# Patient Record
Sex: MALE | Race: WHITE | NOT HISPANIC OR LATINO | Employment: OTHER | URBAN - METROPOLITAN AREA
[De-identification: names, ages, dates, MRNs, and addresses within clinical notes are randomized per-mention and may not be internally consistent; named-entity substitution may affect disease eponyms.]

---

## 2019-08-09 ENCOUNTER — APPOINTMENT (OUTPATIENT)
Dept: RADIOLOGY | Facility: MEDICAL CENTER | Age: 74
End: 2019-08-09
Payer: MEDICARE

## 2019-08-09 ENCOUNTER — OFFICE VISIT (OUTPATIENT)
Dept: URGENT CARE | Facility: MEDICAL CENTER | Age: 74
End: 2019-08-09
Payer: MEDICARE

## 2019-08-09 VITALS
BODY MASS INDEX: 26.95 KG/M2 | WEIGHT: 203.38 LBS | RESPIRATION RATE: 18 BRPM | OXYGEN SATURATION: 99 % | HEIGHT: 73 IN | SYSTOLIC BLOOD PRESSURE: 120 MMHG | HEART RATE: 63 BPM | TEMPERATURE: 97.8 F | DIASTOLIC BLOOD PRESSURE: 70 MMHG

## 2019-08-09 DIAGNOSIS — S20.211A CONTUSION OF RIB ON RIGHT SIDE, INITIAL ENCOUNTER: ICD-10-CM

## 2019-08-09 DIAGNOSIS — S20.211A CONTUSION OF RIB ON RIGHT SIDE, INITIAL ENCOUNTER: Primary | ICD-10-CM

## 2019-08-09 PROCEDURE — 99213 OFFICE O/P EST LOW 20 MIN: CPT | Performed by: PHYSICIAN ASSISTANT

## 2019-08-09 PROCEDURE — 73080 X-RAY EXAM OF ELBOW: CPT

## 2019-08-09 PROCEDURE — G0463 HOSPITAL OUTPT CLINIC VISIT: HCPCS | Performed by: PHYSICIAN ASSISTANT

## 2019-08-09 PROCEDURE — 71101 X-RAY EXAM UNILAT RIBS/CHEST: CPT

## 2019-08-09 RX ORDER — RANITIDINE 300 MG/1
300 TABLET ORAL
COMMUNITY
End: 2020-11-12 | Stop reason: ALTCHOICE

## 2019-08-09 RX ORDER — SIMVASTATIN 10 MG
40 TABLET ORAL
COMMUNITY
Start: 2011-07-20 | End: 2022-03-31 | Stop reason: SDUPTHER

## 2019-08-09 NOTE — PATIENT INSTRUCTIONS
Rib contusion  Elbow contusion  Over the counter tylenol for pain    Follow up with PCP in 3-5 days  Proceed to  ER if symptoms worsen  Rib Contusion   WHAT YOU NEED TO KNOW:   A rib contusion is a bruise on one or more of your ribs  DISCHARGE INSTRUCTIONS:   Return to the emergency department if:   · You have increased chest pain  · You have shortness of breath  · You start to cough up blood  · Your pain does not improve with pain medicine  Contact your healthcare provider if:   · You have a cough  · You have a fever  · You have questions or concerns about your condition or care  Medicines: You may need any of the following:  · NSAIDs , such as ibuprofen, help decrease swelling, pain, and fever  This medicine is available with or without a doctor's order  NSAIDs can cause stomach bleeding or kidney problems in certain people  If you take blood thinner medicine, always ask if NSAIDs are safe for you  Always read the medicine label and follow directions  Do not give these medicines to children under 10months of age without direction from your child's healthcare provider  · Prescription pain medicine  may be given  Ask how to take this medicine safely  · Take your medicine as directed  Contact your healthcare provider if you think your medicine is not helping or if you have side effects  Tell him of her if you are allergic to any medicine  Keep a list of the medicines, vitamins, and herbs you take  Include the amounts, and when and why you take them  Bring the list or the pill bottles to follow-up visits  Carry your medicine list with you in case of an emergency  Deep breathing:   · To help prevent pneumonia, take 10 deep breaths every hour, even when you wake up during the night  Brace your ribs with your hands or a pillow while you take deep breaths or cough  This will help decrease your pain  · You may need to use an incentive spirometer to help you take deeper breaths   Put the plastic piece into your mouth and take a very deep breath  Hold your breath as long as you can  Then let out your breath  Do this 10 times in a row every hour while you are awake  Rest:  Rest your ribs to decrease swelling and allow the injury to heal faster  Avoid activities that may cause more pain or damage to your ribs  As your pain decreases, begin movements slowly  Ice:  Ice helps decrease swelling and pain  Ice may also help prevent tissue damage  Use an ice pack or put crushed ice in a plastic bag  Cover it with a towel and place it on your bruised area for 15 to 20 minutes every hour as directed  Follow up with your healthcare provider as directed:  Write down your questions so you remember to ask them during your visits  © 2017 [facility] Information is for End User's use only and may not be sold, redistributed or otherwise used for commercial purposes  All illustrations and images included in CareNotes® are the copyrighted property of A D A M [employer] Inc  or [employer] Carlos Schuster  The above information is an  only  It is not intended as medical advice for individual conditions or treatments  Talk to your doctor, nurse or pharmacist before following any medical regimen to see if it is safe and effective for you

## 2019-08-09 NOTE — PROGRESS NOTES
[facility]        NAME: Garrick Mccullough is a 69 y o  male  : 1945    MRN: [id number]  DATE: 2019  TIME: 10:06 AM    Assessment and Plan   Contusion of rib on right side, initial encounter [S20 211A]  1  Contusion of rib on right side, initial encounter  XR ribs right w pa chest min 3 views         Patient Instructions     Rib contusion  Elbow contusion  Over the counter tylenol for pain    Follow up with PCP in 3-5 days  Proceed to  ER if symptoms worsen  Chief Complaint     Chief Complaint   Patient presents with   Mollie Sizer Fall     trip while walking dog over curb  injury right elbow, abrasion noted on right knee and forearm  pt has pacemaker on right side of chest and chest feels bruised  chest feels better today than yestrday  no bruising noted to skin   History of Present Illness       68 y/o male states he was walking his dog he tripped on uneven ground and fell on to right side, complains of pain to right side of ribs and right elbow  Patient denies chest pain, SOB, nausea, vomiting, palpitations, diaphoresis  States his tetanus is up to date      Review of Systems   Review of Systems   Constitutional: Negative  HENT: Negative  Eyes: Negative  Respiratory: Negative  Negative for apnea, cough, choking, chest tightness, shortness of breath, wheezing and stridor  Cardiovascular: Negative  Negative for chest pain           Current Medications       Current Outpatient Medications:     Glutamine 500 MG CAPS, Take by mouth, Disp: , Rfl:     ranitidine (ZANTAC) 300 MG tablet, Take 300 mg by mouth daily at bedtime, Disp: , Rfl:     rivaroxaban (XARELTO) 20 mg tablet, Take 20 mg by mouth, Disp: , Rfl:     simvastatin (ZOCOR) 10 mg tablet, Take by mouth, Disp: , Rfl:     Current Allergies     Allergies as of 2019    (No Known Allergies)            The following portions of the patient's history were reviewed and updated as appropriate: allergies, current medications, past family history, past medical history, past social history, past surgical history and problem list      Past Medical History:   Diagnosis Date    GERD (gastroesophageal reflux disease)     High cholesterol        Past Surgical History:   Procedure Laterality Date    CARDIAC PACEMAKER PLACEMENT      CARDIAC PACEMAKER PLACEMENT         No family history on file  Medications have been verified  Objective   /70   Pulse 63   Temp 97 8 °F (36 6 °C) (Temporal)   Resp 18   Ht 6' 1" (1 854 m)   Wt 92 3 kg (203 lb 6 oz)   SpO2 99%   BMI 26 83 kg/m²        Physical Exam     Physical Exam   Constitutional: He appears well-developed and well-nourished  No distress  Neck: Normal range of motion  Neck supple  Cardiovascular: Normal rate, regular rhythm, normal heart sounds and intact distal pulses  Pulmonary/Chest: Effort normal and breath sounds normal  No respiratory distress  He has no wheezes  He has no rales  He exhibits no tenderness  Musculoskeletal:        Right elbow: He exhibits swelling  He exhibits normal range of motion, no effusion, no deformity and no laceration  Tenderness found  Olecranon process tenderness noted  No radial head, no medial epicondyle and no lateral epicondyle tenderness noted  Arms:       Legs:  Lymphadenopathy:     He has no cervical adenopathy  Skin: He is not diaphoretic

## 2020-11-12 ENCOUNTER — PREP FOR PROCEDURE (OUTPATIENT)
Dept: SURGERY | Facility: CLINIC | Age: 75
End: 2020-11-12

## 2020-11-12 DIAGNOSIS — K46.9 HERNIA OF ABDOMINAL CAVITY: Primary | ICD-10-CM

## 2020-11-12 PROBLEM — I49.5 SICK SINUS SYNDROME (HCC): Chronic | Status: ACTIVE | Noted: 2020-11-12

## 2020-12-05 ENCOUNTER — LAB (OUTPATIENT)
Dept: LAB | Facility: CLINIC | Age: 75
End: 2020-12-05
Payer: MEDICARE

## 2020-12-05 ENCOUNTER — OFFICE VISIT (OUTPATIENT)
Dept: LAB | Facility: CLINIC | Age: 75
End: 2020-12-05
Payer: MEDICARE

## 2020-12-05 DIAGNOSIS — K40.90 INGUINAL HERNIA OF LEFT SIDE WITHOUT OBSTRUCTION OR GANGRENE: ICD-10-CM

## 2020-12-05 DIAGNOSIS — K46.9 HERNIA OF ABDOMINAL CAVITY: ICD-10-CM

## 2020-12-05 LAB
ANION GAP SERPL CALCULATED.3IONS-SCNC: 8 MMOL/L (ref 4–13)
BASOPHILS # BLD AUTO: 0.02 THOUSANDS/ΜL (ref 0–0.1)
BASOPHILS NFR BLD AUTO: 0 % (ref 0–1)
BUN SERPL-MCNC: 15 MG/DL (ref 5–25)
CALCIUM SERPL-MCNC: 8.9 MG/DL (ref 8.3–10.1)
CHLORIDE SERPL-SCNC: 103 MMOL/L (ref 100–108)
CO2 SERPL-SCNC: 27 MMOL/L (ref 21–32)
CREAT SERPL-MCNC: 0.97 MG/DL (ref 0.6–1.3)
EOSINOPHIL # BLD AUTO: 0.15 THOUSAND/ΜL (ref 0–0.61)
EOSINOPHIL NFR BLD AUTO: 2 % (ref 0–6)
ERYTHROCYTE [DISTWIDTH] IN BLOOD BY AUTOMATED COUNT: 13.2 % (ref 11.6–15.1)
GFR SERPL CREATININE-BSD FRML MDRD: 77 ML/MIN/1.73SQ M
GLUCOSE P FAST SERPL-MCNC: 97 MG/DL (ref 65–99)
HCT VFR BLD AUTO: 38.7 % (ref 36.5–49.3)
HGB BLD-MCNC: 12.5 G/DL (ref 12–17)
IMM GRANULOCYTES # BLD AUTO: 0.04 THOUSAND/UL (ref 0–0.2)
IMM GRANULOCYTES NFR BLD AUTO: 1 % (ref 0–2)
LYMPHOCYTES # BLD AUTO: 2.91 THOUSANDS/ΜL (ref 0.6–4.47)
LYMPHOCYTES NFR BLD AUTO: 45 % (ref 14–44)
MCH RBC QN AUTO: 30.6 PG (ref 26.8–34.3)
MCHC RBC AUTO-ENTMCNC: 32.3 G/DL (ref 31.4–37.4)
MCV RBC AUTO: 95 FL (ref 82–98)
MONOCYTES # BLD AUTO: 0.44 THOUSAND/ΜL (ref 0.17–1.22)
MONOCYTES NFR BLD AUTO: 7 % (ref 4–12)
NEUTROPHILS # BLD AUTO: 2.97 THOUSANDS/ΜL (ref 1.85–7.62)
NEUTS SEG NFR BLD AUTO: 45 % (ref 43–75)
NRBC BLD AUTO-RTO: 0 /100 WBCS
PLATELET # BLD AUTO: 207 THOUSANDS/UL (ref 149–390)
PMV BLD AUTO: 10.3 FL (ref 8.9–12.7)
POTASSIUM SERPL-SCNC: 3.8 MMOL/L (ref 3.5–5.3)
RBC # BLD AUTO: 4.09 MILLION/UL (ref 3.88–5.62)
SODIUM SERPL-SCNC: 138 MMOL/L (ref 136–145)
WBC # BLD AUTO: 6.53 THOUSAND/UL (ref 4.31–10.16)

## 2020-12-05 PROCEDURE — 80048 BASIC METABOLIC PNL TOTAL CA: CPT

## 2020-12-05 PROCEDURE — U0003 INFECTIOUS AGENT DETECTION BY NUCLEIC ACID (DNA OR RNA); SEVERE ACUTE RESPIRATORY SYNDROME CORONAVIRUS 2 (SARS-COV-2) (CORONAVIRUS DISEASE [COVID-19]), AMPLIFIED PROBE TECHNIQUE, MAKING USE OF HIGH THROUGHPUT TECHNOLOGIES AS DESCRIBED BY CMS-2020-01-R: HCPCS | Performed by: SURGERY

## 2020-12-05 PROCEDURE — 93005 ELECTROCARDIOGRAM TRACING: CPT

## 2020-12-05 PROCEDURE — 36415 COLL VENOUS BLD VENIPUNCTURE: CPT

## 2020-12-05 PROCEDURE — 85025 COMPLETE CBC W/AUTO DIFF WBC: CPT

## 2020-12-06 LAB — SARS-COV-2 RNA SPEC QL NAA+PROBE: NOT DETECTED

## 2020-12-08 LAB
ATRIAL RATE: 64 BPM
P AXIS: 95 DEGREES
PR INTERVAL: 272 MS
QRS AXIS: -33 DEGREES
QRSD INTERVAL: 110 MS
QT INTERVAL: 454 MS
QTC INTERVAL: 468 MS
T WAVE AXIS: -47 DEGREES
VENTRICULAR RATE: 64 BPM

## 2020-12-08 PROCEDURE — 93010 ELECTROCARDIOGRAM REPORT: CPT | Performed by: INTERNAL MEDICINE

## 2020-12-10 ENCOUNTER — ANESTHESIA EVENT (OUTPATIENT)
Dept: PERIOP | Facility: HOSPITAL | Age: 75
End: 2020-12-10
Payer: MEDICARE

## 2020-12-11 ENCOUNTER — ANESTHESIA (OUTPATIENT)
Dept: PERIOP | Facility: HOSPITAL | Age: 75
End: 2020-12-11
Payer: MEDICARE

## 2020-12-11 ENCOUNTER — HOSPITAL ENCOUNTER (OUTPATIENT)
Facility: HOSPITAL | Age: 75
Setting detail: OUTPATIENT SURGERY
Discharge: HOME | End: 2020-12-11
Attending: SURGERY | Admitting: SURGERY
Payer: MEDICARE

## 2020-12-11 VITALS
HEIGHT: 73 IN | SYSTOLIC BLOOD PRESSURE: 153 MMHG | DIASTOLIC BLOOD PRESSURE: 76 MMHG | BODY MASS INDEX: 30.62 KG/M2 | TEMPERATURE: 97.2 F | WEIGHT: 231 LBS | HEART RATE: 57 BPM | RESPIRATION RATE: 18 BRPM | OXYGEN SATURATION: 96 %

## 2020-12-11 VITALS — HEART RATE: 60 BPM

## 2020-12-11 DIAGNOSIS — K40.90 LEFT INGUINAL HERNIA: Primary | ICD-10-CM

## 2020-12-11 PROCEDURE — 99024 POSTOP FOLLOW-UP VISIT: CPT | Performed by: SURGERY

## 2020-12-11 PROCEDURE — 49650 LAP ING HERNIA REPAIR INIT: CPT | Performed by: SURGERY

## 2020-12-11 PROCEDURE — C1781 MESH (IMPLANTABLE): HCPCS | Performed by: SURGERY

## 2020-12-11 DEVICE — POLYPROPYLENE NON-ABSORBABLE SYNTHETIC SURGICAL MESH
Type: IMPLANTABLE DEVICE | Site: INGUINAL | Status: FUNCTIONAL
Brand: PROLENE

## 2020-12-11 RX ORDER — ONDANSETRON 2 MG/ML
4 INJECTION INTRAMUSCULAR; INTRAVENOUS EVERY 6 HOURS PRN
Status: CANCELLED | OUTPATIENT
Start: 2020-12-11

## 2020-12-11 RX ORDER — FENTANYL CITRATE/PF 50 MCG/ML
25 SYRINGE (ML) INJECTION
Status: DISCONTINUED | OUTPATIENT
Start: 2020-12-11 | End: 2020-12-11 | Stop reason: HOSPADM

## 2020-12-11 RX ORDER — OXYCODONE HYDROCHLORIDE AND ACETAMINOPHEN 5; 325 MG/1; MG/1
1 TABLET ORAL EVERY 4 HOURS PRN
Qty: 25 TABLET | Refills: 0 | Status: SHIPPED | OUTPATIENT
Start: 2020-12-11 | End: 2020-12-18

## 2020-12-11 RX ORDER — LIDOCAINE HYDROCHLORIDE 20 MG/ML
INJECTION, SOLUTION EPIDURAL; INFILTRATION; INTRACAUDAL; PERINEURAL AS NEEDED
Status: DISCONTINUED | OUTPATIENT
Start: 2020-12-11 | End: 2020-12-11

## 2020-12-11 RX ORDER — CEFAZOLIN SODIUM 2 G/50ML
2000 SOLUTION INTRAVENOUS ONCE
Status: COMPLETED | OUTPATIENT
Start: 2020-12-11 | End: 2020-12-11

## 2020-12-11 RX ORDER — HEPARIN SODIUM 5000 [USP'U]/ML
5000 INJECTION, SOLUTION INTRAVENOUS; SUBCUTANEOUS ONCE
Status: COMPLETED | OUTPATIENT
Start: 2020-12-11 | End: 2020-12-11

## 2020-12-11 RX ORDER — FENTANYL CITRATE 50 UG/ML
INJECTION, SOLUTION INTRAMUSCULAR; INTRAVENOUS AS NEEDED
Status: DISCONTINUED | OUTPATIENT
Start: 2020-12-11 | End: 2020-12-11

## 2020-12-11 RX ORDER — OXYCODONE HYDROCHLORIDE AND ACETAMINOPHEN 5; 325 MG/1; MG/1
1 TABLET ORAL EVERY 4 HOURS PRN
Status: CANCELLED | OUTPATIENT
Start: 2020-12-11

## 2020-12-11 RX ORDER — BUPIVACAINE HYDROCHLORIDE AND EPINEPHRINE 2.5; 5 MG/ML; UG/ML
INJECTION, SOLUTION EPIDURAL; INFILTRATION; INTRACAUDAL; PERINEURAL AS NEEDED
Status: DISCONTINUED | OUTPATIENT
Start: 2020-12-11 | End: 2020-12-11 | Stop reason: HOSPADM

## 2020-12-11 RX ORDER — DEXAMETHASONE SODIUM PHOSPHATE 4 MG/ML
INJECTION, SOLUTION INTRA-ARTICULAR; INTRALESIONAL; INTRAMUSCULAR; INTRAVENOUS; SOFT TISSUE AS NEEDED
Status: DISCONTINUED | OUTPATIENT
Start: 2020-12-11 | End: 2020-12-11

## 2020-12-11 RX ORDER — SODIUM CHLORIDE, SODIUM LACTATE, POTASSIUM CHLORIDE, CALCIUM CHLORIDE 600; 310; 30; 20 MG/100ML; MG/100ML; MG/100ML; MG/100ML
75 INJECTION, SOLUTION INTRAVENOUS CONTINUOUS
Status: CANCELLED | OUTPATIENT
Start: 2020-12-11

## 2020-12-11 RX ORDER — SODIUM CHLORIDE, SODIUM LACTATE, POTASSIUM CHLORIDE, CALCIUM CHLORIDE 600; 310; 30; 20 MG/100ML; MG/100ML; MG/100ML; MG/100ML
20 INJECTION, SOLUTION INTRAVENOUS CONTINUOUS
Status: CANCELLED | OUTPATIENT
Start: 2020-12-11

## 2020-12-11 RX ORDER — PROPOFOL 10 MG/ML
INJECTION, EMULSION INTRAVENOUS AS NEEDED
Status: DISCONTINUED | OUTPATIENT
Start: 2020-12-11 | End: 2020-12-11

## 2020-12-11 RX ORDER — DIPHENHYDRAMINE HYDROCHLORIDE 50 MG/ML
12.5 INJECTION INTRAMUSCULAR; INTRAVENOUS ONCE AS NEEDED
Status: DISCONTINUED | OUTPATIENT
Start: 2020-12-11 | End: 2020-12-11 | Stop reason: HOSPADM

## 2020-12-11 RX ORDER — ONDANSETRON 2 MG/ML
INJECTION INTRAMUSCULAR; INTRAVENOUS AS NEEDED
Status: DISCONTINUED | OUTPATIENT
Start: 2020-12-11 | End: 2020-12-11

## 2020-12-11 RX ORDER — SODIUM CHLORIDE, SODIUM LACTATE, POTASSIUM CHLORIDE, CALCIUM CHLORIDE 600; 310; 30; 20 MG/100ML; MG/100ML; MG/100ML; MG/100ML
125 INJECTION, SOLUTION INTRAVENOUS CONTINUOUS
Status: DISCONTINUED | OUTPATIENT
Start: 2020-12-11 | End: 2020-12-11 | Stop reason: HOSPADM

## 2020-12-11 RX ADMIN — CEFAZOLIN SODIUM 2000 MG: 2 SOLUTION INTRAVENOUS at 08:20

## 2020-12-11 RX ADMIN — PHENYLEPHRINE HYDROCHLORIDE 100 MCG: 10 INJECTION INTRAVENOUS at 09:30

## 2020-12-11 RX ADMIN — FENTANYL CITRATE 50 MCG: 50 INJECTION, SOLUTION INTRAMUSCULAR; INTRAVENOUS at 08:30

## 2020-12-11 RX ADMIN — HEPARIN SODIUM 5000 UNITS: 5000 INJECTION, SOLUTION INTRAVENOUS; SUBCUTANEOUS at 08:28

## 2020-12-11 RX ADMIN — SODIUM CHLORIDE, SODIUM LACTATE, POTASSIUM CHLORIDE, AND CALCIUM CHLORIDE 125 ML/HR: .6; .31; .03; .02 INJECTION, SOLUTION INTRAVENOUS at 07:22

## 2020-12-11 RX ADMIN — LIDOCAINE HYDROCHLORIDE 50 MG: 20 INJECTION, SOLUTION EPIDURAL; INFILTRATION; INTRACAUDAL; PERINEURAL at 08:25

## 2020-12-11 RX ADMIN — ONDANSETRON 4 MG: 2 INJECTION INTRAMUSCULAR; INTRAVENOUS at 08:34

## 2020-12-11 RX ADMIN — FENTANYL CITRATE 50 MCG: 50 INJECTION, SOLUTION INTRAMUSCULAR; INTRAVENOUS at 08:42

## 2020-12-11 RX ADMIN — PHENYLEPHRINE HYDROCHLORIDE 100 MCG: 10 INJECTION INTRAVENOUS at 08:51

## 2020-12-11 RX ADMIN — PHENYLEPHRINE HYDROCHLORIDE 50 MCG: 10 INJECTION INTRAVENOUS at 09:16

## 2020-12-11 RX ADMIN — FENTANYL CITRATE 25 MCG: 50 INJECTION INTRAMUSCULAR; INTRAVENOUS at 10:01

## 2020-12-11 RX ADMIN — PROPOFOL 200 MG: 10 INJECTION, EMULSION INTRAVENOUS at 08:25

## 2020-12-11 RX ADMIN — PHENYLEPHRINE HYDROCHLORIDE 100 MCG: 10 INJECTION INTRAVENOUS at 08:59

## 2020-12-11 RX ADMIN — DEXAMETHASONE SODIUM PHOSPHATE 4 MG: 4 INJECTION, SOLUTION INTRA-ARTICULAR; INTRALESIONAL; INTRAMUSCULAR; INTRAVENOUS; SOFT TISSUE at 08:34

## 2020-12-29 ENCOUNTER — OFFICE VISIT (OUTPATIENT)
Dept: SURGERY | Facility: CLINIC | Age: 75
End: 2020-12-29

## 2020-12-29 VITALS
HEART RATE: 76 BPM | SYSTOLIC BLOOD PRESSURE: 126 MMHG | HEIGHT: 73 IN | BODY MASS INDEX: 30.88 KG/M2 | DIASTOLIC BLOOD PRESSURE: 84 MMHG | WEIGHT: 233 LBS

## 2020-12-29 DIAGNOSIS — K40.90 INGUINAL HERNIA OF LEFT SIDE WITHOUT OBSTRUCTION OR GANGRENE: Primary | ICD-10-CM

## 2020-12-29 PROCEDURE — 99024 POSTOP FOLLOW-UP VISIT: CPT | Performed by: SURGERY

## 2021-05-05 ENCOUNTER — OFFICE VISIT (OUTPATIENT)
Dept: CARDIOLOGY CLINIC | Facility: MEDICAL CENTER | Age: 76
End: 2021-05-05
Payer: MEDICARE

## 2021-05-05 VITALS
SYSTOLIC BLOOD PRESSURE: 118 MMHG | DIASTOLIC BLOOD PRESSURE: 76 MMHG | OXYGEN SATURATION: 97 % | BODY MASS INDEX: 30.95 KG/M2 | HEIGHT: 73 IN | WEIGHT: 233.5 LBS | HEART RATE: 70 BPM

## 2021-05-05 DIAGNOSIS — I10 ESSENTIAL HYPERTENSION: ICD-10-CM

## 2021-05-05 DIAGNOSIS — R06.02 SHORTNESS OF BREATH: ICD-10-CM

## 2021-05-05 DIAGNOSIS — Z95.0 CARDIAC PACEMAKER IN SITU: ICD-10-CM

## 2021-05-05 DIAGNOSIS — R94.31 ABNORMAL ECG: ICD-10-CM

## 2021-05-05 DIAGNOSIS — Z76.89 ESTABLISHING CARE WITH NEW DOCTOR, ENCOUNTER FOR: Primary | ICD-10-CM

## 2021-05-05 PROCEDURE — 99204 OFFICE O/P NEW MOD 45 MIN: CPT | Performed by: INTERNAL MEDICINE

## 2021-05-05 PROCEDURE — 93000 ELECTROCARDIOGRAM COMPLETE: CPT | Performed by: INTERNAL MEDICINE

## 2021-05-05 NOTE — PROGRESS NOTES
[facility] [address] [address]  Phone#  [phone]  Fax#  [phone]                                              Cardiology Office Consult   Eren Nunn, 78 y o  male  YOB: 1945  MRN: [id number] Encounter: [id number]      PCP - No primary care provider on file  Assessment  1  Shortness of breath  2  Abnormal ECG  3  s/p cardiac pacemaker - dual-chamber, right-sided  · Had 3 5 second pause on holter monitor --> pacemaker (2018)  4  Hypertension  5  Hyperlipidemia  6  GERD  7  S/p left inguinal hernia repair (12/2020)    Plan  Shortness of breath, abnormal ECG  · Ongoing for several months, gradually progressive  · He has gained about 20-30 lbs in the last 1-2 yrs, states he used to be 180 lbs previously  · ?CAD v deconditioning  ·  ECG today is highly concerning for ischemia with deep T-wave inversions in inferior and anterolateral leads  · No recent stress testing  · He is unable to exercise adequately for stress test due to symptoms, and needs imaging due to abnormal ECG  · Check nuclear Lexiscan stress test & echocardiogram    ? Paroxysmal Atrial Fibrillation  · He had a history of irregular heart beats, and had a holter & extended holter in the past in [place]  · Was started on eliquis by his cardiologist there, and remains on it --> Have sent request for records from there  · Currently in sinus rhythm  · Continue eliquis for now    S/p dual-chamber pacemaker, sick sinus syndrome  · Was apparently placed for 3 2nd pause noted on 1 of the Holter monitors, although he was asymptomatic  ·  has a dual-chamber device in place, and prior ECG from December 2020 was AV sequentially paced   · He still gets his device monitored with his cardiologist in [place], home he still continues to follow intermittently, as he goes back and forth between here and [place]    · He plans to continue following device checks with his cardiologist in [place] for now - has a merlin remote device at home    ECG today -  No results found for this visit on 05/05/21  Orders Placed This Encounter   Procedures    POCT ECG       No follow-ups on file  History of Present Illness     82-year-old gentleman comes in as a new patient for consultation regarding ongoing symptoms of shortness of breath with exertion  He trades classic cars, 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  He is constantly back and forth between here and [place], and all of his cardiac care so far has been with a cardiologist (Benigno Ng) in [place], [place]  He reports complains of shortness of breath with exertion, which has been gradually worsening over the past year  He believes it is related to his weight gain and inactivity recently, but  It got worse to a point that he was short of breath with even walking short distances, and as a result he could not go to a trade show recently  No complains of chest pain, nausea or diaphoresis  Due to worsening symptoms, he wanted to get evaluated further and as a result is here for evaluation  No known prior history of CAD  No family history of early CAD  He does not smoke  He reports seeing the cardiologist in [place] due to irregular heartbeats, and cardiac monitors in the past   On one of the cardiac monitor, he was noted to have a 3 5 second pause, as a result of which he was I must to get urgently admitted for a pacemaker in 2018      Historical Information   Past Medical History:   Diagnosis Date    Bradycardia     permanent pacemaker    GERD (gastroesophageal reflux disease)     Hearing aid worn     High cholesterol     Hypertension      Past Surgical History:   Procedure Laterality Date    CARDIAC PACEMAKER PLACEMENT  2017    sees cardiologist in [place]- last visit was 5/2020 and has a monitor to check pacemaker   [facility]  2018 [address] REPAIR      [place] REPAIR RECURR Gypsy Palomino Left 12/11/2020    Procedure: REPAIR HERNIA INGUINAL;  Surgeon: Ewa Torres MD;  Location: [facility];  Service: General     Family History   Problem Relation Age of Onset    Cancer Mother      Current Outpatient Medications on File Prior to Visit   Medication Sig Dispense Refill    apixaban (Eliquis) 5 mg Take 5 mg by mouth 2 (two) times a day Last dose 12/8/20      Glutamine 500 MG CAPS Take by mouth every morning       omeprazole (PriLOSEC) 20 mg delayed release capsule Take 20 mg by mouth daily      simvastatin (ZOCOR) 10 mg tablet Take 40 mg by mouth daily at bedtime        No current facility-administered medications on file prior to visit        No Known Allergies  Social History     Socioeconomic History    Marital status:      Spouse name: None    Number of children: None    Years of education: None    Highest education level: None   Occupational History    None   Social Needs    Financial resource strain: None    Food insecurity     Worry: None     Inability: None    Transportation needs     Medical: None     Non-medical: None   Tobacco Use    Smoking status: Never Smoker    Smokeless tobacco: Never Used   Substance and Sexual Activity    Alcohol use: Yes     Comment: occ    Drug use: Never    Sexual activity: Not Currently   Lifestyle    Physical activity     Days per week: None     Minutes per session: None    Stress: None   Relationships    Social connections     Talks on phone: None     Gets together: None     Attends Zoroastrian service: None     Active member of club or organization: None     Attends meetings of clubs or organizations: None     Relationship status: None    Intimate partner violence     Fear of current or ex partner: None     Emotionally abused: None     Physically abused: None     Forced sexual activity: None   Other Topics Concern    None   Social History Narrative    None        Review of Systems All other systems reviewed and are negative  Vitals:  Vitals:    05/05/21 1316   Weight: 106 kg (233 lb 8 oz)   Height: 6' 1" (1 854 m)     BMI - Body mass index is 30 81 kg/m²  Wt Readings from Last 7 Encounters:   05/05/21 106 kg (233 lb 8 oz)   12/29/20 106 kg (233 lb)   12/11/20 105 kg (231 lb)   11/12/20 99 8 kg (220 lb)   08/09/19 92 3 kg (203 lb 6 oz)   12/09/11 98 kg (216 lb)   08/16/11 95 7 kg (211 lb)       Physical Exam  Vitals signs and nursing note reviewed  Constitutional:       General: He is not in acute distress  Appearance: Normal appearance  He is well-developed  He is not ill-appearing or diaphoretic  HENT:      Head: Normocephalic and atraumatic  Nose: No congestion  Eyes:      General: No scleral icterus  Conjunctiva/sclera: Conjunctivae normal    Neck:      Musculoskeletal: Neck supple  No muscular tenderness  Vascular: No carotid bruit or JVD  Cardiovascular:      Rate and Rhythm: Normal rate and regular rhythm  Heart sounds: Normal heart sounds  No murmur  No friction rub  No gallop  Comments: Right upper chest wall cardiac device noted in-situ  No swelling or tenderness surrounding it  Pulmonary:      Effort: Pulmonary effort is normal  No respiratory distress  Breath sounds: Normal breath sounds  No wheezing or rales  Chest:      Chest wall: No tenderness  Abdominal:      General: There is no distension  Palpations: Abdomen is soft  Tenderness: There is no abdominal tenderness  Musculoskeletal:         General: No swelling, tenderness or deformity  Right lower leg: No edema  Left lower leg: No edema  Skin:     General: Skin is warm  Neurological:      General: No focal deficit present  Mental Status: He is alert and oriented to person, place, and time  Mental status is at baseline  Psychiatric:         Mood and Affect: Mood normal          Behavior: Behavior normal          Thought Content:  Thought content normal        Labs:  CBC:   Lab Results   Component Value Date    WBC 6 53 12/05/2020    RBC 4 09 12/05/2020    HGB 12 5 12/05/2020    HCT 38 7 12/05/2020    MCV 95 12/05/2020     12/05/2020    RDW 13 2 12/05/2020       CMP:   Lab Results   Component Value Date    K 3 8 12/05/2020     12/05/2020    CO2 27 12/05/2020    BUN 15 12/05/2020    CREATININE 0 97 12/05/2020    EGFR 77 12/05/2020    CALCIUM 8 9 12/05/2020       Magnesium:  No results found for: MG    Lipid Profile:   No results found for: CHOL, HDL, TRIG, LDLCALC    Thyroid Studies: No results found for: [id number], T3FREE, FREET4, L0YPZUV, W4YQVPQ    No components found for: HGA1C    No results found for: IIA4    Imaging: No results found  Cardiac testing:   No results found for this or any previous visit  No results found for this or any previous visit  No results found for this or any previous visit  No results found for this or any previous visit

## 2021-06-10 ENCOUNTER — HOSPITAL ENCOUNTER (OUTPATIENT)
Dept: NON INVASIVE DIAGNOSTICS | Facility: CLINIC | Age: 76
Discharge: HOME | End: 2021-06-10
Payer: MEDICARE

## 2021-06-10 DIAGNOSIS — R06.02 SHORTNESS OF BREATH: ICD-10-CM

## 2021-06-10 DIAGNOSIS — I10 ESSENTIAL HYPERTENSION: ICD-10-CM

## 2021-06-10 DIAGNOSIS — R94.31 ABNORMAL ECG: ICD-10-CM

## 2021-06-10 DIAGNOSIS — Z95.0 CARDIAC PACEMAKER IN SITU: ICD-10-CM

## 2021-06-10 PROCEDURE — A9502 TC99M TETROFOSMIN: HCPCS

## 2021-06-10 PROCEDURE — G1004 CDSM NDSC: HCPCS

## 2021-06-10 PROCEDURE — 93306 TTE W/DOPPLER COMPLETE: CPT | Performed by: INTERNAL MEDICINE

## 2021-06-10 PROCEDURE — 93306 TTE W/DOPPLER COMPLETE: CPT

## 2021-06-10 PROCEDURE — 93018 CV STRESS TEST I&R ONLY: CPT | Performed by: INTERNAL MEDICINE

## 2021-06-10 PROCEDURE — 93016 CV STRESS TEST SUPVJ ONLY: CPT | Performed by: INTERNAL MEDICINE

## 2021-06-10 PROCEDURE — 78452 HT MUSCLE IMAGE SPECT MULT: CPT | Performed by: INTERNAL MEDICINE

## 2021-06-10 PROCEDURE — 93017 CV STRESS TEST TRACING ONLY: CPT

## 2021-06-10 PROCEDURE — 78452 HT MUSCLE IMAGE SPECT MULT: CPT

## 2021-06-10 RX ADMIN — REGADENOSON 0.4 MG: 0.08 INJECTION, SOLUTION INTRAVENOUS at 08:39

## 2021-06-16 LAB
MAX DIASTOLIC BP: 90 MMHG
MAX HEART RATE: 64 BPM
MAX PREDICTED HEART RATE: 146 BPM
MAX. SYSTOLIC BP: 154 MMHG
PROTOCOL NAME: NORMAL
REASON FOR TERMINATION: NORMAL
TARGET HR FORMULA: NORMAL
TIME IN EXERCISE PHASE: NORMAL

## 2021-07-12 ENCOUNTER — OFFICE VISIT (OUTPATIENT)
Dept: CARDIOLOGY CLINIC | Facility: MEDICAL CENTER | Age: 76
End: 2021-07-12
Payer: MEDICARE

## 2021-07-12 VITALS
HEIGHT: 73 IN | DIASTOLIC BLOOD PRESSURE: 76 MMHG | SYSTOLIC BLOOD PRESSURE: 118 MMHG | WEIGHT: 231.7 LBS | HEART RATE: 71 BPM | OXYGEN SATURATION: 96 % | BODY MASS INDEX: 30.71 KG/M2

## 2021-07-12 DIAGNOSIS — R06.02 SHORTNESS OF BREATH: Primary | ICD-10-CM

## 2021-07-12 DIAGNOSIS — Z95.0 CARDIAC PACEMAKER IN SITU: ICD-10-CM

## 2021-07-12 DIAGNOSIS — I10 ESSENTIAL HYPERTENSION: ICD-10-CM

## 2021-07-12 DIAGNOSIS — I48.0 PAF (PAROXYSMAL ATRIAL FIBRILLATION) (HCC): ICD-10-CM

## 2021-07-12 PROCEDURE — 99214 OFFICE O/P EST MOD 30 MIN: CPT | Performed by: INTERNAL MEDICINE

## 2021-07-12 NOTE — PROGRESS NOTES
[facility] [place]  MARKIE Arias [address] [place] [id number]  Phone#  [phone]  Fax#  [phone]                                              Cardiology Office Follow up  Washington Rivera, 78 y o  male  YOB: 1945  MRN: [id number] Encounter: [id number]      PCP - No primary care provider on file  Assessment  1  Shortness of breath  · Nuclear Rx stress - EF 45%, Small to moderate-sized moderately severe, partially reversible perfusion defect of apical inferolateral /basal inferoseptal wall, possibly related to artifact, but cannot exclude ischemia  · Echo - 6/10/21 - EF 50-55%, Grade 1 DD, possible hypokinesis of apex, mild MR  2  Abnormal ECG  · Deep TWI in anterolateral leads during native conduction - ?memory TWI related to pacing  3  s/p cardiac pacemaker - dual-chamber, right-sided  · For SSS - had 3 5 second pause on holter monitor --> pacemaker (2018)  4  Hypertension  5  Hyperlipidemia  6  GERD  7  S/p left inguinal hernia repair (12/2020)    Plan  Shortness of breath, abnormal ECG  · Has continued to gain more weight, and is up another 10 lb   · Shortness of breath otherwise it is only with moderate to severe exertion, and he reports that he walked around multiple miles during the weekend car show, without problems  · ECG had previously shown diffuse T-wave inversions, which  were again noted during native conduction during the stress test  · Nuclear Lexiscan stress test showed a small partially reversible perfusion defect of apical inferolateral wall - on my personal review, this is possibly related to artifact - apical thinning or diaphragm  · Echo with possible apical hypokinesis related to pacing artifact   · Overall, symptoms are stable/ improving and no diagnostic evidence of moderate-large amount of ischemia  · Will continue to monitor clinically  · Continue Eliquis, statin    ?  Paroxysmal Atrial Fibrillation  · He had a history of irregular heart beats, and had a holter & extended holter in the past in [place]  · Was started on eliquis by his cardiologist there, and remains on it --> Have sent request for records from there, but still not received  · Remains in NSR on clinical exam  · Will get pacemaker check, and at least assess burden of Afib on same  · Continue eliquis for now    S/p dual-chamber pacemaker, sick sinus syndrome  · Was apparently placed for 3 2 second pause noted on 1 of the Holter monitors, although he was asymptomatic  ·  has a dual-chamber device in place, and prior ECG from December 2020 was AV sequentially paced   ·  he previously stated he was getting device checks through his cardiologist in [place] at   · But he is going to be more in when gap from here on, and since we have not received any records from them, he is okay with switching device checks to our office here  · Will have device clinic contact him to setup device check with EP (reports having a merlin monitor at home)    ECG today -  No results found for this visit on 07/12/21  No orders of the defined types were placed in this encounter  Return in about 6 months (around 1/12/2022), or if symptoms worsen or fail to improve  History of Present Illness     46-year-old gentleman comes in as a new patient for consultation regarding ongoing symptoms of shortness of breath with exertion  He trades classic cars, 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  He is constantly back and forth between here and [place], and all of his cardiac care so far has been with a cardiologist (Mariposa Manrique) in [place], [place]  He reports complains of shortness of breath with exertion, which has been gradually worsening over the past year    He believes it is related to his weight gain and inactivity recently, but  It got worse to a point that he was short of breath with even walking short distances, and as a result he could not go to a trade show recently  No complains of chest pain, nausea or diaphoresis  Due to worsening symptoms, he wanted to get evaluated further and as a result is here for evaluation  No known prior history of CAD  No family history of early CAD  He does not smoke  He reports seeing the cardiologist in [place] due to irregular heartbeats, and cardiac monitors in the past   On one of the cardiac monitor, he was noted to have a 3 5 second pause, as a result of which he was I must to get urgently admitted for a pacemaker in 2018  Interval history - 7/12/2021   he comes back for follow-up after about 2 months  He completed his testing, and is here to discuss results  He continues to report shortness of breath with moderate to severe exertion, but is stable or slightly better  He reports that he went to a car show over the weekend, and had to walk for miles throughout does days, and did not have any major problems with it  He had painted his home over the prior weekend, and reports not having some coughing after this, which is getting better as well  No complains of orthopnea or PND  No complains of chest pain        Historical Information   Past Medical History:   Diagnosis Date    Bradycardia     permanent pacemaker    GERD (gastroesophageal reflux disease)     Hearing aid worn     High cholesterol     Hypertension      Past Surgical History:   Procedure Laterality Date    CARDIAC PACEMAKER PLACEMENT  2017    sees cardiologist in [place]- last visit was 5/2020 and has a monitor to check pacemaker   [address]  2018   [place] Left 12/11/2020    Procedure: [address];  Surgeon: Jonny Swanson MD;  Location: [facility];  Service: General     Family History   Problem Relation Age of Onset    Cancer Mother      Current Outpatient Medications on File Prior to Visit   Medication Sig Dispense Refill    Glutamine 500 MG CAPS Take by mouth every morning       omeprazole (PriLOSEC) 20 mg delayed release capsule Take 20 mg by mouth daily      simvastatin (ZOCOR) 10 mg tablet Take 40 mg by mouth daily at bedtime       apixaban (Eliquis) 5 mg Take 5 mg by mouth 2 (two) times a day Last dose 12/8/20       No current facility-administered medications on file prior to visit  No Known Allergies  Social History     Socioeconomic History    Marital status:      Spouse name: None    Number of children: None    Years of education: None    Highest education level: None   Occupational History    None   Tobacco Use    Smoking status: Never Smoker    Smokeless tobacco: Never Used   Vaping Use    Vaping Use: Never used   Substance and Sexual Activity    Alcohol use: Yes     Comment: occ    Drug use: Never    Sexual activity: Not Currently   Other Topics Concern    None   Social History Narrative    None     Social Determinants of Health     Financial Resource Strain:     Difficulty of Paying Living Expenses:    Food Insecurity:     Worried About Running Out of Food in the Last Year:     Ran Out of Food in the Last Year:    Transportation Needs:     Lack of Transportation (Medical):  Lack of Transportation (Non-Medical):    Physical Activity:     Days of Exercise per Week:     Minutes of Exercise per Session:    Stress:     Feeling of Stress :    Social Connections:     Frequency of Communication with Friends and Family:     Frequency of Social Gatherings with Friends and Family:     Attends [employer] Services:     Active Member of Clubs or Organizations:     Attends Club or Organization Meetings:     Marital Status:    Intimate Partner Violence:     Fear of Current or Ex-Partner:     Emotionally Abused:     Physically Abused:     Sexually Abused:         Review of Systems   All other systems reviewed and are negative        Vitals:  Vitals:    07/12/21 0804   BP: 118/76   Pulse: 71   SpO2: 96% Weight: 105 kg (231 lb 11 2 oz)   Height: 6' 1" (1 854 m)     BMI - Body mass index is 30 57 kg/m²  Wt Readings from Last 7 Encounters:   07/12/21 105 kg (231 lb 11 2 oz)   05/05/21 106 kg (233 lb 8 oz)   12/29/20 106 kg (233 lb)   12/11/20 105 kg (231 lb)   11/12/20 99 8 kg (220 lb)   08/09/19 92 3 kg (203 lb 6 oz)   12/09/11 98 kg (216 lb)       Physical Exam  Vitals and nursing note reviewed  Constitutional:       General: He is not in acute distress  Appearance: Normal appearance  He is well-developed  He is not ill-appearing or diaphoretic  HENT:      Head: Normocephalic and atraumatic  Nose: No congestion  Eyes:      General: No scleral icterus  Conjunctiva/sclera: Conjunctivae normal    Neck:      Vascular: No carotid bruit or JVD  Cardiovascular:      Rate and Rhythm: Normal rate and regular rhythm  Heart sounds: Normal heart sounds  No murmur heard  No friction rub  No gallop  Comments: Right upper chest wall cardiac device noted in-situ  No swelling or tenderness surrounding it  Pulmonary:      Effort: Pulmonary effort is normal  No respiratory distress  Breath sounds: Normal breath sounds  No wheezing or rales  Chest:      Chest wall: No tenderness  Abdominal:      General: There is no distension  Palpations: Abdomen is soft  Tenderness: There is no abdominal tenderness  Musculoskeletal:         General: No swelling, tenderness or deformity  Cervical back: Neck supple  No muscular tenderness  Right lower leg: No edema  Left lower leg: No edema  Skin:     General: Skin is warm  Neurological:      General: No focal deficit present  Mental Status: He is alert and oriented to person, place, and time  Mental status is at baseline  Psychiatric:         Mood and Affect: Mood normal          Behavior: Behavior normal          Thought Content:  Thought content normal        Labs:  CBC:   Lab Results   Component Value Date    WBC 6 53 12/05/2020    RBC 4 09 12/05/2020    HGB 12 5 12/05/2020    HCT 38 7 12/05/2020    MCV 95 12/05/2020     12/05/2020    RDW 13 2 12/05/2020       CMP:   Lab Results   Component Value Date    K 3 8 12/05/2020     12/05/2020    CO2 27 12/05/2020    BUN 15 12/05/2020    CREATININE 0 97 12/05/2020    EGFR 77 12/05/2020    CALCIUM 8 9 12/05/2020       Magnesium:  No results found for: MG    Lipid Profile:   No results found for: CHOL, HDL, TRIG, LDLCALC    Thyroid Studies: No results found for: GTN1CJHKYXNN, T3FREE, FREET4, Z2SRPCQ, T4ALMPO    No components found for: HGA1C    No results found for: AGS5    Imaging: No results found  Cardiac testing:   No results found for this or any previous visit  No results found for this or any previous visit  No results found for this or any previous visit  No results found for this or any previous visit

## 2021-08-03 ENCOUNTER — IN-CLINIC DEVICE VISIT (OUTPATIENT)
Dept: CARDIOLOGY CLINIC | Facility: MEDICAL CENTER | Age: 76
End: 2021-08-03
Payer: MEDICARE

## 2021-08-03 DIAGNOSIS — Z95.0 PRESENCE OF PERMANENT CARDIAC PACEMAKER: Primary | ICD-10-CM

## 2021-08-03 PROCEDURE — 93280 PM DEVICE PROGR EVAL DUAL: CPT | Performed by: INTERNAL MEDICINE

## 2021-08-03 NOTE — PROGRESS NOTES
SJM DUAL PM / ACTIVE SYSTEM IS MRI CONDITIONAL   DEVICE INTERROGATED IN THE [facility] OFFICE:  BATTERY VOLTAGE ADEQUATE (9 4 YR)   AP 51%  37%   ALL LEAD PARAMETERS WITHIN NORMAL LIMITS   1 HVR EPISODE ON 7/2/21 SHOWING NSVT (13 @ 200 BPM)    22 AMS EPISODES WITH 7 AVAILABLE EGMS SHOWING AT/AF WITH AF BURDEN <1% SINCE 10/10/2017   EF 45% (NST 06/2021)   PT TAKES ELIQUIS, NO BB LISTED   TASK TO DR LR   NO PROGRAMMING CHANGES MADE TO DEVICE PARAMETERS   NORMAL DEVICE FUNCTION  Tunde Grey

## 2021-08-04 NOTE — PROGRESS NOTES
BMI Counseling: Body mass index is 30 5 kg/m²  The BMI is above normal  Nutrition recommendations include decreasing portion sizes, encouraging healthy choices of fruits and vegetables, decreasing fast food intake, consuming healthier snacks, limiting drinks that contain sugar, moderation in carbohydrate intake, increasing intake of lean protein, reducing intake of saturated and trans fat and reducing intake of cholesterol  Exercise recommendations include vigorous physical activity 75 minutes/week, exercising 3-5 times per week, obtaining a gym membership and strength training exercises  No pharmacotherapy was ordered  Assessment/Plan:         Problem List Items Addressed This Visit        Cardiovascular and Mediastinum    Sick sinus syndrome (HCC) (Chronic)     Patient has history of sick sinus syndrome had pacemaker placed 3 years prior  Recently seen by Cardiology  No distress noted  Patient also maintained on Eliquis 5 mg p o  B i d          Cardiomyopathy, unspecified type [facility])     Patient has a history of sick sinus syndrome  Routine lab work ordered at this time  Patient has seen his cardiologist recently without any ill effect  Nervous and Auditory    Sensorineural hearing loss (SNHL) of both ears     Patient has hearing loss and currently has bilateral hearing aids  To follow-up with Otolaryngology  Other    Cough - Primary     Patient has concerns due to work exposure in the past to chemicals and carcinogens  Currently order for chest x-ray for evaluation  Relevant Orders    XR chest pa & lateral    Comprehensive metabolic panel    CBC and differential    Need for hepatitis C screening test     Hepatitis-C screening for low risk patients ordered at this time  Relevant Orders    Hepatitis C Antibody ([facility])    Need for Tdap vaccination     Patient requires Tdap vaccination           Relevant Medications    tetanus-diphtheria-acellular pertussis (ADACEL) 5-2-15 5 LF-mcg/0 5 injection    Screening, lipid     Patient order for repeat evaluation of lipid panel  Instructed on low-cholesterol low-fat diet  Currently maintained on Zocor 10 mg p o  Daily  Relevant Orders    Lipid panel            Subjective:      Patient ID: Lauryn Ro is a 78 y o  male  Patient is a 81-year-old male concern for persistent intermittent cough  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, as well as has a history of smoking which he quit in 1973 - 2 packs a day for 5 years  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  Currently order for chest x-ray  Patient also has a pacemaker for prior history of sick sinus syndrome is on Eliquis 5 mg p o  B i d  Recently saw Cardiologist   Concerns for obtaining CDL physical discussed  The following portions of the patient's history were reviewed and updated as appropriate:   Past Medical History:  He has a past medical history of Bradycardia, GERD (gastroesophageal reflux disease), Hearing aid worn, High cholesterol, and Hypertension  ,  _______________________________________________________________________  Medical Problems:  does not have any pertinent problems on file ,  _______________________________________________________________________  Past Surgical History:   has a past surgical history that includes Cystoscopy (2018); Hernia repair; Cardiac pacemaker placement (2017); Cardiac pacemaker placement; and pr repair recurr inguin flor,reducibl (Left, 12/11/2020)  ,  _______________________________________________________________________  Family History:  family history includes Cancer in his mother ,  _______________________________________________________________________  Social History:   reports that he has never smoked  He has never used smokeless tobacco  He reports current alcohol use   He reports that he does not use drugs ,  _______________________________________________________________________  Allergies:  has No Known Allergies     _______________________________________________________________________  Current Outpatient Medications   Medication Sig Dispense Refill    apixaban (Eliquis) 5 mg Take 5 mg by mouth 2 (two) times a day Last dose 12/8/20      Glutamine 500 MG CAPS Take by mouth every morning       omeprazole (PriLOSEC) 20 mg delayed release capsule Take 20 mg by mouth daily      simvastatin (ZOCOR) 10 mg tablet Take 40 mg by mouth daily at bedtime       tetanus-diphtheria-acellular pertussis (ADACEL) 5-2-15 5 LF-mcg/0 5 injection Inject 0 5 mL into a muscle once for 1 dose 0 5 mL 0     No current facility-administered medications for this visit      _______________________________________________________________________  Review of Systems   Constitutional: Negative for activity change, appetite change, chills, fatigue, fever and unexpected weight change  HENT: Negative for congestion, ear discharge, ear pain, nosebleeds, postnasal drip, rhinorrhea, sinus pressure, sinus pain, sneezing, sore throat and voice change  Eyes: Negative for pain, redness and visual disturbance  Respiratory: Positive for cough  Negative for apnea, chest tightness, shortness of breath and wheezing  Intermittent dry cough  Cardiovascular: Negative for chest pain and palpitations  Gastrointestinal: Negative for abdominal distention, abdominal pain, constipation, diarrhea, nausea and vomiting  Endocrine: Negative  Genitourinary: Negative for decreased urine volume, difficulty urinating, flank pain, frequency, hematuria and urgency  Musculoskeletal: Negative for arthralgias and myalgias  Skin: Negative  Allergic/Immunologic: Negative  Neurological: Negative  Hematological: Negative  Psychiatric/Behavioral: Negative            Objective:  Vitals:    08/05/21 1107   BP: 124/78   BP Location: Left arm Patient Position: Sitting   Cuff Size: Large   Pulse: 60   Temp: 98 9 °F (37 2 °C)   SpO2: 98%   Weight: 105 kg (231 lb 3 2 oz)   Height: 6' 1" (1 854 m)     Body mass index is 30 5 kg/m²  Physical Exam  Vitals and nursing note reviewed  Constitutional:       Appearance: Normal appearance  He is well-developed  He is obese  HENT:      Head: Normocephalic and atraumatic  Right Ear: Tympanic membrane, ear canal and external ear normal       Left Ear: Tympanic membrane, ear canal and external ear normal       Nose: Nose normal       Mouth/Throat:      Mouth: Mucous membranes are moist    Eyes:      Extraocular Movements: Extraocular movements intact  Conjunctiva/sclera: Conjunctivae normal       Pupils: Pupils are equal, round, and reactive to light  Cardiovascular:      Rate and Rhythm: Normal rate and regular rhythm  Pulses: Normal pulses  Heart sounds: Normal heart sounds  No murmur heard  Pulmonary:      Effort: Pulmonary effort is normal       Breath sounds: Normal breath sounds  Abdominal:      General: Bowel sounds are normal       Palpations: Abdomen is soft  Musculoskeletal:         General: Normal range of motion  Cervical back: Normal range of motion  Skin:     General: Skin is warm  Capillary Refill: Capillary refill takes less than 2 seconds  Neurological:      General: No focal deficit present  Mental Status: He is alert and oriented to person, place, and time     Psychiatric:         Mood and Affect: Mood normal          Behavior: Behavior normal  Alert/Cooperative

## 2021-08-05 ENCOUNTER — APPOINTMENT (OUTPATIENT)
Dept: RADIOLOGY | Facility: MEDICAL CENTER | Age: 76
End: 2021-08-05
Payer: MEDICARE

## 2021-08-05 ENCOUNTER — OFFICE VISIT (OUTPATIENT)
Dept: FAMILY MEDICINE CLINIC | Facility: MEDICAL CENTER | Age: 76
End: 2021-08-05
Payer: MEDICARE

## 2021-08-05 VITALS
BODY MASS INDEX: 30.64 KG/M2 | OXYGEN SATURATION: 98 % | WEIGHT: 231.2 LBS | SYSTOLIC BLOOD PRESSURE: 124 MMHG | DIASTOLIC BLOOD PRESSURE: 78 MMHG | HEART RATE: 60 BPM | TEMPERATURE: 98.9 F | HEIGHT: 73 IN

## 2021-08-05 DIAGNOSIS — H90.3 SENSORINEURAL HEARING LOSS (SNHL) OF BOTH EARS: ICD-10-CM

## 2021-08-05 DIAGNOSIS — R05.9 COUGH: Primary | ICD-10-CM

## 2021-08-05 DIAGNOSIS — Z13.29 SCREENING FOR THYROID DISORDER: ICD-10-CM

## 2021-08-05 DIAGNOSIS — I49.5 SICK SINUS SYNDROME (HCC): Chronic | ICD-10-CM

## 2021-08-05 DIAGNOSIS — Z00.00 PHYSICAL EXAM: ICD-10-CM

## 2021-08-05 DIAGNOSIS — Z13.220 SCREENING, LIPID: ICD-10-CM

## 2021-08-05 DIAGNOSIS — Z23 NEED FOR PNEUMOCOCCAL VACCINATION: ICD-10-CM

## 2021-08-05 DIAGNOSIS — Z23 NEED FOR TDAP VACCINATION: ICD-10-CM

## 2021-08-05 DIAGNOSIS — R05.9 COUGH: ICD-10-CM

## 2021-08-05 DIAGNOSIS — Z11.59 NEED FOR HEPATITIS C SCREENING TEST: ICD-10-CM

## 2021-08-05 DIAGNOSIS — I42.9 CARDIOMYOPATHY, UNSPECIFIED TYPE (HCC): ICD-10-CM

## 2021-08-05 DIAGNOSIS — Z12.11 COLON CANCER SCREENING: ICD-10-CM

## 2021-08-05 DIAGNOSIS — Z13.89 SCREENING FOR BLOOD OR PROTEIN IN URINE: ICD-10-CM

## 2021-08-05 PROBLEM — Z13.21 ENCOUNTER FOR VITAMIN DEFICIENCY SCREENING: Status: ACTIVE | Noted: 2021-08-05

## 2021-08-05 PROCEDURE — 90732 PPSV23 VACC 2 YRS+ SUBQ/IM: CPT | Performed by: NURSE PRACTITIONER

## 2021-08-05 PROCEDURE — G0009 ADMIN PNEUMOCOCCAL VACCINE: HCPCS | Performed by: NURSE PRACTITIONER

## 2021-08-05 PROCEDURE — 71046 X-RAY EXAM CHEST 2 VIEWS: CPT

## 2021-08-05 PROCEDURE — 99215 OFFICE O/P EST HI 40 MIN: CPT | Performed by: NURSE PRACTITIONER

## 2021-08-05 NOTE — ASSESSMENT & PLAN NOTE
Patient has history of sick sinus syndrome had pacemaker placed 3 years prior  Recently seen by Cardiology  No distress noted  Patient also maintained on Eliquis 5 mg p o  B i d

## 2021-08-05 NOTE — PATIENT INSTRUCTIONS
Chronic Cough   WHAT YOU NEED TO KNOW:   What is a chronic cough? A chronic cough is a cough that lasts more than 4 weeks in children or 8 weeks in adults  What causes a chronic cough? · Smoking or exposure to secondhand smoke    · Allergies    · Acid reflux    · Lung conditions such as asthma, COPD, lung cancer, or pneumonia    · Medicines such as blood pressure or heart medicines    · Conditions such as cystic fibrosis    · Lung infections such as pertussis or tuberculosis    What other signs and symptoms might I have? · Wheezing and shortness of breath    · A runny or stuffy nose    · Pain or itching in your throat    · Red, swollen, watery eyes    · A raspy or hoarse voice    · Heartburn or a sour taste in your mouth    How is a chronic cough diagnosed? Your healthcare provider will examine you and ask about your symptoms  Tell him about your medical conditions, medicines, and any recent respiratory infections  Tell him if you have ever smoked, currently smoke, or are exposed to secondhand smoke  You may need a chest x-ray to check for problems with your lungs  You may need other tests to find the cause of your chronic cough  This may include blood tests, lung function tests, or an endoscopy  Ask your healthcare provider for more information on these tests  How is a chronic cough treated? The cough may go away on its own without treatment  You may need medicine to stop the cough or treat the cause of your cough  This may include medicine to treat allergies or acid reflux, or decrease swelling in your airways  You may also need antibiotics to treat a respiratory infection  If you take medicine that causes a chronic cough, it may be stopped or changed  You may need speech therapy  A speech therapist can teach you ways to control your cough  What can I do to care for myself? · Prevent acid reflex  Acid reflux can make your chronic cough worse  Raise your head and upper back when you sleep   Place 2 or more pillows behind your head or sleep in a recliner  Do not lie down for at least 1 hour after you eat  Do not have foods or drinks that increase heartburn  Ask your healthcare provider for other ways to prevent acid reflux  · Do not smoke  Encourage your adolescent child not to smoke  Nicotine and other chemicals in cigarettes and cigars can cause lung damage  They can also make your cough worse  Ask your healthcare provider for information if you currently smoke and need help to quit  E-cigarettes or smokeless tobacco still contain nicotine  Talk to your healthcare provider before you use these products  · Stay away from secondhand smoke  Do not let people smoke in your car, home, or near your child  Do not stand near someone that is smoking  This includes anyone that is smoking an E-cigarrete  · Avoid anything that triggers your allergies or irritates your throat  Allergens and irritants can make your chronic cough worse  Allergens may include dust mites, pollen, pet dander, or mold  Wear a mask if you work around pollutants or irritants  Ask your healthcare provider for more ways to decrease your exposure to allergens or irritants  · Drink plenty of liquids as directed  Liquids may help relieve throat discomfort that causes you to cough  Add honey to tea or hot water to help ease your throat pain  Ask how much liquid to drink each day and which liquids are best for you  Call 911 for any of the following:   · You cough up blood  · You faint when you cough  · You have trouble breathing  When should I contact my healthcare provider? · You have new or worsening symptoms  · You have severe pain when you take a deep breath  · You become very tired after a coughing fit  · You have trouble sleeping because of the coughing  · You have questions or concerns about your condition or care  CARE AGREEMENT:   You have the right to help plan your care   Learn about your health condition and how it may be treated  Discuss treatment options with your healthcare providers to decide what care you want to receive  You always have the right to refuse treatment  The above information is an  only  It is not intended as medical advice for individual conditions or treatments  Talk to your doctor, nurse or pharmacist before following any medical regimen to see if it is safe and effective for you  © Copyright [employer] 2021 Information is for End User's use only and may not be sold, redistributed or otherwise used for commercial purposes  All illustrations and images included in CareNotes® are the copyrighted property of [employer]  or [employer]   Hearing Loss   WHAT YOU NEED TO KNOW:   What is hearing loss? Hearing loss means you have trouble hearing or you cannot hear at all in one or both ears  Hearing loss can happen suddenly or slowly over time  What are the types of hearing loss? · Conductive hearing loss  occurs when there is a problem with the outer or middle ear  Sound waves cannot reach your inner ear  This type of hearing loss may be caused by earwax buildup, fluid, or a punctured ear drum  It can often be treated by correcting the cause of the problem  · Sensorineural hearing loss  is caused by damage to parts of the inner ear  There is usually no cure for sensorineural hearing loss  · Mixed hearing loss  includes both conductive and sensorineural hearing loss  What causes hearing loss? · Aging    · Regular exposure to loud noise    · Head injury     · Blockage in your ear caused by earwax buildup, swelling, cyst, or other growth    · Medical conditions such as ear infections or otosclerosis (abnormal growth of bones in the ear)    · Medicines that damage your ears such as aspirin, certain antibiotics, and diuretics    What are the signs and symptoms that you may have hearing loss? · You often ask others to repeat what they just said  You may think people are mumbling or not speaking clearly  Family members ask you if your hearing is okay  · You cup your hand behind one of your ears when you listen  · You need to have the radio or television louder than usual     · You need to lean forward or turn your head to be able to hear  · You have ringing or buzzing in your ears, or you are dizzy  · You avoid certain situations because you have a hard time hearing  How is hearing loss diagnosed? Your healthcare provider will ask about your hearing loss and examine your ears  You may need any of the following:  · Hearing tests  may be done to check how well you hear whispered words or soft sounds such as a finger rub  · A tuning fork  may be used to test your hearing  A tuning fork is made of metal  It vibrates and makes noise when it strikes an object  Your healthcare provider will hold the tuning fork to the left and right of your head  He will ask if you can hear the noise and feel the vibration in each ear  · Audiometry  is a test used to measure how well you can hear different sounds  You will put on headphones that are attached to a machine  Sounds will be sent through the headphones  You will press a button or raise your hand when you hear the sounds  Each ear will be tested separately  Another device will be placed on the bone behind your ear  The device will test how well vibration moves through the bones  This is called bone conduction  · Tympanometry  is a test used to find hearing problems in the middle ear  A device is placed into your ear  The device creates pressure changes that make your eardrum vibrate  How is hearing loss treated? Treatment depends on the cause of your hearing loss  Removal of earwax or treatment for any medical conditions that have caused your hearing loss may be needed   You may need any of the following:  · A hearing aid  is a small device that fits inside your ear and helps you hear better  Your healthcare provider can help you choose a hearing aid that is right for you  · A cochlear implant  is a tiny device that is put into your cochlea (part of your inner ear) during surgery  This device can only be used in people with sensorineural hearing loss  · Assistive listening devices  (ALDs)  sound and send it through earphones or a headset  ALDs can help you hear better when you are in a place with background noise  Examples include theaters, classrooms, or auditoriums  ALDs are also available for phones  ALDs can be used alone or with hearing aids or cochlear implants  · Surgery  may be needed if your hearing loss is caused by otosclerosis  Surgery may also be done to place small tubes in your ear  These tubes help drain fluid and help prevent ear infections  How can I manage my hearing loss? · Protect your hearing  Use ear plugs or ear protectors if you do activities that are very loud  These include using a lawnmower and power tools or going to a concert that has loud music  Use well-fitting foam earplugs that completely block your ear canal  Do not listen to loud music through headphones or earphones  · Tell people that you have hearing loss  Ask people to face you directly when they speak to you, and to slow down if they are speaking too fast  When you are in a group setting, sit in a location where you can clearly see the faces of the people who are speaking  Ask people not to speak loudly or shout when they are speaking to you  Try to talk with others in a quiet place  Background noise makes it harder for you to hear  · Pay close attention to your surroundings when you drive  Do not talk to people in your car while you are driving  Watch for problems on the road or approaching emergency vehicles  When should I seek immediate care? · You have fluid, pus, or blood leaking from your ear  · You have sudden, severe hearing loss      When should I contact my healthcare provider? · You have a fever  · You have ear pain that is getting worse  · You have ringing in your ears or dizziness that will not go away  · You have questions or concerns about your condition or care  CARE AGREEMENT:   You have the right to help plan your care  Learn about your health condition and how it may be treated  Discuss treatment options with your healthcare providers to decide what care you want to receive  You always have the right to refuse treatment  The above information is an  only  It is not intended as medical advice for individual conditions or treatments  Talk to your doctor, nurse or pharmacist before following any medical regimen to see if it is safe and effective for you  © Copyright [employer] 2021 Information is for End User's use only and may not be sold, redistributed or otherwise used for commercial purposes   All illustrations and images included in CareNotes® are the copyrighted property of [employer]  or [address] [place] [address] [employer]

## 2021-08-05 NOTE — ASSESSMENT & PLAN NOTE
Patient has concerns due to work exposure in the past to chemicals and carcinogens  Currently order for chest x-ray for evaluation

## 2021-08-05 NOTE — ASSESSMENT & PLAN NOTE
Patient has hearing loss and currently has bilateral hearing aids  To follow-up with Otolaryngology

## 2021-08-05 NOTE — ASSESSMENT & PLAN NOTE
Patient has a history of sick sinus syndrome  Routine lab work ordered at this time  Patient has seen his cardiologist recently without any ill effect

## 2021-08-05 NOTE — ASSESSMENT & PLAN NOTE
Patient order for repeat evaluation of lipid panel  Instructed on low-cholesterol low-fat diet  Currently maintained on Zocor 10 mg p o  Daily

## 2021-09-20 DIAGNOSIS — I48.0 PAF (PAROXYSMAL ATRIAL FIBRILLATION) (HCC): Primary | ICD-10-CM

## 2021-11-01 ENCOUNTER — APPOINTMENT (OUTPATIENT)
Dept: LAB | Facility: MEDICAL CENTER | Age: 76
End: 2021-11-01
Payer: MEDICARE

## 2021-11-01 DIAGNOSIS — Z13.29 SCREENING FOR THYROID DISORDER: ICD-10-CM

## 2021-11-01 DIAGNOSIS — Z11.59 NEED FOR HEPATITIS C SCREENING TEST: ICD-10-CM

## 2021-11-01 DIAGNOSIS — Z13.220 SCREENING, LIPID: ICD-10-CM

## 2021-11-01 DIAGNOSIS — R05.9 COUGH: ICD-10-CM

## 2021-11-01 LAB
ALBUMIN SERPL BCP-MCNC: 3.7 G/DL (ref 3.5–5)
ALP SERPL-CCNC: 64 U/L (ref 46–116)
ALT SERPL W P-5'-P-CCNC: 28 U/L (ref 12–78)
ANION GAP SERPL CALCULATED.3IONS-SCNC: 4 MMOL/L (ref 4–13)
AST SERPL W P-5'-P-CCNC: 20 U/L (ref 5–45)
BACTERIA UR QL AUTO: NORMAL /HPF
BASOPHILS # BLD AUTO: 0.02 THOUSANDS/ΜL (ref 0–0.1)
BASOPHILS NFR BLD AUTO: 0 % (ref 0–1)
BILIRUB SERPL-MCNC: 0.65 MG/DL (ref 0.2–1)
BILIRUB UR QL STRIP: NEGATIVE
BUN SERPL-MCNC: 16 MG/DL (ref 5–25)
CALCIUM SERPL-MCNC: 9.5 MG/DL (ref 8.3–10.1)
CHLORIDE SERPL-SCNC: 105 MMOL/L (ref 100–108)
CHOLEST SERPL-MCNC: 165 MG/DL (ref 50–200)
CLARITY UR: CLEAR
CO2 SERPL-SCNC: 28 MMOL/L (ref 21–32)
COLOR UR: YELLOW
CREAT SERPL-MCNC: 1.01 MG/DL (ref 0.6–1.3)
EOSINOPHIL # BLD AUTO: 0.1 THOUSAND/ΜL (ref 0–0.61)
EOSINOPHIL NFR BLD AUTO: 2 % (ref 0–6)
ERYTHROCYTE [DISTWIDTH] IN BLOOD BY AUTOMATED COUNT: 13 % (ref 11.6–15.1)
GFR SERPL CREATININE-BSD FRML MDRD: 73 ML/MIN/1.73SQ M
GLUCOSE P FAST SERPL-MCNC: 99 MG/DL (ref 65–99)
GLUCOSE UR STRIP-MCNC: NEGATIVE MG/DL
HCT VFR BLD AUTO: 41.6 % (ref 36.5–49.3)
HCV AB SER QL: NORMAL
HDLC SERPL-MCNC: 59 MG/DL
HGB BLD-MCNC: 13.6 G/DL (ref 12–17)
HGB UR QL STRIP.AUTO: ABNORMAL
HYALINE CASTS #/AREA URNS LPF: NORMAL /LPF
IMM GRANULOCYTES # BLD AUTO: 0.02 THOUSAND/UL (ref 0–0.2)
IMM GRANULOCYTES NFR BLD AUTO: 0 % (ref 0–2)
KETONES UR STRIP-MCNC: NEGATIVE MG/DL
LDLC SERPL CALC-MCNC: 72 MG/DL (ref 0–100)
LEUKOCYTE ESTERASE UR QL STRIP: NEGATIVE
LYMPHOCYTES # BLD AUTO: 2.41 THOUSANDS/ΜL (ref 0.6–4.47)
LYMPHOCYTES NFR BLD AUTO: 35 % (ref 14–44)
MCH RBC QN AUTO: 30.9 PG (ref 26.8–34.3)
MCHC RBC AUTO-ENTMCNC: 32.7 G/DL (ref 31.4–37.4)
MCV RBC AUTO: 95 FL (ref 82–98)
MONOCYTES # BLD AUTO: 0.46 THOUSAND/ΜL (ref 0.17–1.22)
MONOCYTES NFR BLD AUTO: 7 % (ref 4–12)
NEUTROPHILS # BLD AUTO: 3.86 THOUSANDS/ΜL (ref 1.85–7.62)
NEUTS SEG NFR BLD AUTO: 56 % (ref 43–75)
NITRITE UR QL STRIP: NEGATIVE
NON-SQ EPI CELLS URNS QL MICRO: NORMAL /HPF
NONHDLC SERPL-MCNC: 106 MG/DL
NRBC BLD AUTO-RTO: 0 /100 WBCS
PH UR STRIP.AUTO: 6 [PH]
PLATELET # BLD AUTO: 219 THOUSANDS/UL (ref 149–390)
PMV BLD AUTO: 11.6 FL (ref 8.9–12.7)
POTASSIUM SERPL-SCNC: 4.1 MMOL/L (ref 3.5–5.3)
PROT SERPL-MCNC: 7.6 G/DL (ref 6.4–8.2)
PROT UR STRIP-MCNC: NEGATIVE MG/DL
RBC # BLD AUTO: 4.4 MILLION/UL (ref 3.88–5.62)
RBC #/AREA URNS AUTO: NORMAL /HPF
SODIUM SERPL-SCNC: 137 MMOL/L (ref 136–145)
SP GR UR STRIP.AUTO: 1.02 (ref 1–1.03)
TRIGL SERPL-MCNC: 170 MG/DL
TSH SERPL DL<=0.05 MIU/L-ACNC: 1.71 UIU/ML (ref 0.36–3.74)
UROBILINOGEN UR QL STRIP.AUTO: 0.2 E.U./DL
WBC # BLD AUTO: 6.87 THOUSAND/UL (ref 4.31–10.16)
WBC #/AREA URNS AUTO: NORMAL /HPF

## 2021-11-01 PROCEDURE — 36415 COLL VENOUS BLD VENIPUNCTURE: CPT

## 2021-11-01 PROCEDURE — 86803 HEPATITIS C AB TEST: CPT

## 2021-11-01 PROCEDURE — 81001 URINALYSIS AUTO W/SCOPE: CPT | Performed by: NURSE PRACTITIONER

## 2021-11-01 PROCEDURE — 80061 LIPID PANEL: CPT

## 2021-11-01 PROCEDURE — 80053 COMPREHEN METABOLIC PANEL: CPT

## 2021-11-01 PROCEDURE — 84443 ASSAY THYROID STIM HORMONE: CPT

## 2021-11-01 PROCEDURE — 85025 COMPLETE CBC W/AUTO DIFF WBC: CPT

## 2021-11-02 ENCOUNTER — OFFICE VISIT (OUTPATIENT)
Dept: FAMILY MEDICINE CLINIC | Facility: MEDICAL CENTER | Age: 76
End: 2021-11-02
Payer: MEDICARE

## 2021-11-02 VITALS
DIASTOLIC BLOOD PRESSURE: 70 MMHG | SYSTOLIC BLOOD PRESSURE: 118 MMHG | HEART RATE: 64 BPM | WEIGHT: 232 LBS | TEMPERATURE: 98.1 F | HEIGHT: 73 IN | BODY MASS INDEX: 30.75 KG/M2

## 2021-11-02 DIAGNOSIS — I42.9 CARDIOMYOPATHY, UNSPECIFIED TYPE (HCC): ICD-10-CM

## 2021-11-02 DIAGNOSIS — Z23 ENCOUNTER FOR IMMUNIZATION: Primary | ICD-10-CM

## 2021-11-02 DIAGNOSIS — N64.4 BREAST PAIN: ICD-10-CM

## 2021-11-02 DIAGNOSIS — Z91.89 INCREASED RISK OF BREAST CANCER: ICD-10-CM

## 2021-11-02 DIAGNOSIS — Z00.00 MEDICARE ANNUAL WELLNESS VISIT, INITIAL: ICD-10-CM

## 2021-11-02 DIAGNOSIS — Z12.31 ENCOUNTER FOR SCREENING MAMMOGRAM FOR MALIGNANT NEOPLASM OF BREAST: ICD-10-CM

## 2021-11-02 DIAGNOSIS — N64.4 NIPPLE PAIN: ICD-10-CM

## 2021-11-02 DIAGNOSIS — M81.0 OSTEOPOROSIS WITHOUT CURRENT PATHOLOGICAL FRACTURE, UNSPECIFIED OSTEOPOROSIS TYPE: ICD-10-CM

## 2021-11-02 DIAGNOSIS — Z13.6 SCREENING FOR CARDIOVASCULAR CONDITION: ICD-10-CM

## 2021-11-02 DIAGNOSIS — Z12.5 SCREENING FOR PROSTATE CANCER: ICD-10-CM

## 2021-11-02 DIAGNOSIS — Z13.1 SCREENING FOR DIABETES MELLITUS: ICD-10-CM

## 2021-11-02 DIAGNOSIS — I49.5 SICK SINUS SYNDROME (HCC): Chronic | ICD-10-CM

## 2021-11-02 DIAGNOSIS — Z00.00 MEDICARE ANNUAL WELLNESS VISIT, SUBSEQUENT: ICD-10-CM

## 2021-11-02 PROCEDURE — G0438 PPPS, INITIAL VISIT: HCPCS | Performed by: NURSE PRACTITIONER

## 2021-11-02 PROCEDURE — G0008 ADMIN INFLUENZA VIRUS VAC: HCPCS

## 2021-11-02 PROCEDURE — 99215 OFFICE O/P EST HI 40 MIN: CPT | Performed by: NURSE PRACTITIONER

## 2021-11-02 PROCEDURE — 1123F ACP DISCUSS/DSCN MKR DOCD: CPT | Performed by: NURSE PRACTITIONER

## 2021-11-02 PROCEDURE — 90662 IIV NO PRSV INCREASED AG IM: CPT

## 2021-11-02 RX ORDER — PYRIDOXINE HCL (VITAMIN B6) 50 MG
50 TABLET ORAL DAILY
COMMUNITY

## 2021-11-02 RX ORDER — DIPHENOXYLATE HYDROCHLORIDE AND ATROPINE SULFATE 2.5; .025 MG/1; MG/1
1 TABLET ORAL DAILY
COMMUNITY

## 2021-11-05 ENCOUNTER — REMOTE DEVICE CLINIC VISIT (OUTPATIENT)
Dept: CARDIOLOGY CLINIC | Facility: CLINIC | Age: 76
End: 2021-11-05
Payer: MEDICARE

## 2021-11-05 DIAGNOSIS — Z95.0 CARDIAC PACEMAKER IN SITU: Primary | ICD-10-CM

## 2021-11-05 PROCEDURE — 93296 REM INTERROG EVL PM/IDS: CPT | Performed by: INTERNAL MEDICINE

## 2021-11-05 PROCEDURE — 93294 REM INTERROG EVL PM/LDLS PM: CPT | Performed by: INTERNAL MEDICINE

## 2021-11-11 ENCOUNTER — HOSPITAL ENCOUNTER (OUTPATIENT)
Dept: MAMMOGRAPHY | Facility: CLINIC | Age: 76
Discharge: HOME | End: 2021-11-11
Payer: MEDICARE

## 2021-11-11 ENCOUNTER — HOSPITAL ENCOUNTER (OUTPATIENT)
Dept: ULTRASOUND IMAGING | Facility: CLINIC | Age: 76
Discharge: HOME | End: 2021-11-11
Payer: MEDICARE

## 2021-11-11 VITALS — HEIGHT: 73 IN | WEIGHT: 232 LBS | BODY MASS INDEX: 30.75 KG/M2

## 2021-11-11 DIAGNOSIS — N64.4 NIPPLE PAIN: ICD-10-CM

## 2021-11-11 DIAGNOSIS — N64.4 BREAST PAIN: ICD-10-CM

## 2021-11-11 DIAGNOSIS — Z91.89 INCREASED RISK OF BREAST CANCER: ICD-10-CM

## 2021-11-11 PROCEDURE — G0279 TOMOSYNTHESIS, MAMMO: HCPCS

## 2021-11-11 PROCEDURE — 76642 ULTRASOUND BREAST LIMITED: CPT

## 2021-11-11 PROCEDURE — 77066 DX MAMMO INCL CAD BI: CPT

## 2021-12-07 ENCOUNTER — HOSPITAL ENCOUNTER (OUTPATIENT)
Dept: BONE DENSITY | Facility: CLINIC | Age: 76
Discharge: HOME | End: 2021-12-07

## 2021-12-07 DIAGNOSIS — M81.0 OSTEOPOROSIS WITHOUT CURRENT PATHOLOGICAL FRACTURE, UNSPECIFIED OSTEOPOROSIS TYPE: ICD-10-CM

## 2021-12-28 ENCOUNTER — TELEPHONE (OUTPATIENT)
Dept: GASTROENTEROLOGY | Facility: AMBULARY SURGERY CENTER | Age: 76
End: 2021-12-28

## 2021-12-28 ENCOUNTER — OFFICE VISIT (OUTPATIENT)
Dept: GASTROENTEROLOGY | Facility: AMBULARY SURGERY CENTER | Age: 76
End: 2021-12-28
Payer: MEDICARE

## 2021-12-28 VITALS
HEIGHT: 73 IN | DIASTOLIC BLOOD PRESSURE: 78 MMHG | BODY MASS INDEX: 31.17 KG/M2 | WEIGHT: 235.2 LBS | SYSTOLIC BLOOD PRESSURE: 134 MMHG

## 2021-12-28 DIAGNOSIS — K21.9 GASTROESOPHAGEAL REFLUX DISEASE, UNSPECIFIED WHETHER ESOPHAGITIS PRESENT: Primary | ICD-10-CM

## 2021-12-28 DIAGNOSIS — Z12.11 COLON CANCER SCREENING: ICD-10-CM

## 2021-12-28 DIAGNOSIS — Z80.0 FAMILY HISTORY OF COLON CANCER: ICD-10-CM

## 2021-12-28 PROCEDURE — 99204 OFFICE O/P NEW MOD 45 MIN: CPT | Performed by: INTERNAL MEDICINE

## 2022-01-14 DIAGNOSIS — I48.0 PAF (PAROXYSMAL ATRIAL FIBRILLATION) (HCC): ICD-10-CM

## 2022-01-24 ENCOUNTER — OFFICE VISIT (OUTPATIENT)
Dept: CARDIOLOGY CLINIC | Facility: MEDICAL CENTER | Age: 77
End: 2022-01-24
Payer: MEDICARE

## 2022-01-24 VITALS
WEIGHT: 241 LBS | HEIGHT: 73 IN | HEART RATE: 66 BPM | DIASTOLIC BLOOD PRESSURE: 74 MMHG | SYSTOLIC BLOOD PRESSURE: 130 MMHG | OXYGEN SATURATION: 98 % | BODY MASS INDEX: 31.94 KG/M2

## 2022-01-24 DIAGNOSIS — I10 ESSENTIAL HYPERTENSION: ICD-10-CM

## 2022-01-24 DIAGNOSIS — E66.09 CLASS 1 OBESITY DUE TO EXCESS CALORIES WITHOUT SERIOUS COMORBIDITY WITH BODY MASS INDEX (BMI) OF 31.0 TO 31.9 IN ADULT: ICD-10-CM

## 2022-01-24 DIAGNOSIS — Z95.0 CARDIAC PACEMAKER IN SITU: ICD-10-CM

## 2022-01-24 DIAGNOSIS — I48.0 PAF (PAROXYSMAL ATRIAL FIBRILLATION) (HCC): Primary | ICD-10-CM

## 2022-01-24 PROCEDURE — 99214 OFFICE O/P EST MOD 30 MIN: CPT | Performed by: INTERNAL MEDICINE

## 2022-01-24 NOTE — PROGRESS NOTES
[facility] DOREEN Arias [address] [place] [id number]  Phone#  [phone]  Fax#  [phone]                                              Cardiology Office Follow up  Herman Morgan, 78 y o  male  YOB: 1945  MRN: [id number] Encounter: [id number]      PCP - DEREK Butterfield    Assessment  1  Shortness of breath  · Nuclear Rx stress - 6/2021 - EF 45%, Small to moderate-sized moderately severe, partially reversible perfusion defect of apical inferolateral /basal inferoseptal wall, possibly related to artifact, but cannot exclude ischemia  · Echo - 6/10/21 - EF 50-55%, Grade 1 DD, possible hypokinesis of apex, mild MR  2  Abnormal ECG  · Deep TWI in anterolateral leads during native conduction - ?memory TWI related to pacing  3  s/p cardiac pacemaker - dual-chamber, right-sided  · For SSS - had 3 5 second pause on holter monitor --> pacemaker (2018)  4  Paroxysmal Atrial Fibrillation   5  Hypertension  6  Hyperlipidemia  7  GERD  8   S/p left inguinal hernia repair (12/2020)    Plan  Shortness of breath, abnormal ECG  · He has gained about 38 lb since August 2019  · Shortness of breath is otherwise stable and occurs with severe exertion  · Does not exercise regularly, but states that he is able to walk longer distances when he goes to car shows  · Previous ECGs have had deep T-wave inversions diffusely during native conduction, which are possibly memory DARIUS views related to pacing  · Echo/nuclear stress test showed low normal EF without any diagnostic evidence of any significant ischemia  · Symptoms stable or improving  · Continue to monitor    Paroxysmal Atrial Fibrillation  · He had a history of irregular heart beats, and had a holter & extended holter in the past in [place]  · Was started on eliquis by his cardiologist there, and remains on it  · Reviewed pacer check - 8/3/21 - 22 AMS episodes since 10/10/2017, showing AT/AF, with AF burden < 1%  · No history of any bleeding  · Continue eliquis for now, but if no major episodes of Afib, can consider discontinuation    S/p dual-chamber pacemaker, sick sinus syndrome  · [facility]  · Reviewed recent device check, normal device function  No significant events or high rate episodes    Hyperlipidemia, Obesity - Body mass index is 31 8 kg/m²  11/1/2021 08:47   Cholesterol 165   Triglycerides 170 (H)   HDL 59   Non-HDL Cholesterol 106   LDL Calculated 72   · Triglycerides remain mildly elevated  · Continue simvastatin 40  · Counseled regarding dietary modifications - reduce soda, okay/cracker/chocolates  · Increase exercise to include walking 30 minutes 5 times a week  · Weight loss to target weight of around 215 lb suggested    ECG today -  No results found for this visit on 01/24/22  No orders of the defined types were placed in this encounter  Return in about 6 months (around 7/24/2022), or if symptoms worsen or fail to improve  History of Present Illness   78 y o   gentleman comes in as a new patient for consultation regarding ongoing symptoms of shortness of breath with exertion  He trades classic cars, 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  He is constantly back and forth between here and [place], and all of his cardiac care so far has been with a cardiologist (Stuart Kolb) in [place], [place]  He reports complains of shortness of breath with exertion, which has been gradually worsening over the past year  He believes it is related to his weight gain and inactivity recently, but  It got worse to a point that he was short of breath with even walking short distances, and as a result he could not go to a trade show recently  No complains of chest pain, nausea or diaphoresis  Due to worsening symptoms, he wanted to get evaluated further and as a result is here for evaluation        No known prior history of CAD  No family history of early CAD  He does not smoke  He reports seeing the cardiologist in [place] due to irregular heartbeats, and cardiac monitors in the past   On one of the cardiac monitor, he was noted to have a 3 5 second pause, as a result of which he was I must to get urgently admitted for a pacemaker in 2018  Interval history - 7/12/2021   he comes back for follow-up after about 2 months  He completed his testing, and is here to discuss results  He continues to report shortness of breath with moderate to severe exertion, but is stable or slightly better  He reports that he went to a car show over the weekend, and had to walk for miles throughout does days, and did not have any major problems with it  He had painted his home over the prior weekend, and reports not having some coughing after this, which is getting better as well  No complains of orthopnea or PND  No complains of chest pain  Interval history - 1/24/2022  He comes back for follow-up after about 6 months  He has been doing well overall and has not had any major complains of chest pain, shortness of breath or any persistent palpitations  He continues to remain active although does not exercise routinely  He states that he goes to car shows frequently, and has to walk long distances during this and does not report any problems with it          Historical Information   Past Medical History:   Diagnosis Date    Bradycardia     permanent pacemaker    GERD (gastroesophageal reflux disease)     Hearing aid worn     High cholesterol     Hypertension      Past Surgical History:   Procedure Laterality Date    CARDIAC PACEMAKER PLACEMENT  2017    sees cardiologist in [place]- last visit was 5/2020 and has a monitor to check pacemaker   [facility]   2018   Courtney Pathak Left 12/11/2020    Procedure: REPAIR HERNIA INGUINAL;  Surgeon: Toi Moss MD;  Location: [facility];  Service: General     Family History   Problem Relation Age of Onset    Cancer Mother     Colon cancer Mother     Breast cancer Sister 80     Current Outpatient Medications on File Prior to Visit   Medication Sig Dispense Refill    apixaban (Eliquis) 5 mg Take 1 tablet (5 mg total) by mouth 2 (two) times a day Last dose 12/8/20 90 tablet 3    CALCIUM PO Take by mouth      Ferrous Sulfate (IRON PO) Take by mouth      Glutamine 500 MG CAPS Take by mouth every morning       multivitamin (THERAGRAN) TABS Take 1 tablet by mouth daily      omeprazole (PriLOSEC) 20 mg delayed release capsule Take 40 mg by mouth daily        simvastatin (ZOCOR) 10 mg tablet Take 40 mg by mouth daily at bedtime       vitamin B-12 (CYANOCOBALAMIN) 100 MCG TABS Take 50 mcg by mouth daily       No current facility-administered medications on file prior to visit  Allergies   Allergen Reactions    Other Other (See Comments)     WALNUT     Social History     Socioeconomic History    Marital status:      Spouse name: None    Number of children: None    Years of education: None    Highest education level: None   Occupational History    None   Tobacco Use    Smoking status: Never Smoker    Smokeless tobacco: Never Used   Vaping Use    Vaping Use: Never used   Substance and Sexual Activity    Alcohol use: Yes     Comment: occ    Drug use: Never    Sexual activity: Not Currently   Other Topics Concern    None   Social History Narrative    None     Social Determinants of Health     Financial Resource Strain: Not on file   Food Insecurity: Not on file   Transportation Needs: Not on file   Physical Activity: Not on file   Stress: Not on file   Social Connections: Not on file   Intimate Partner Violence: Not on file   Housing Stability: Not on file        Review of Systems   All other systems reviewed and are negative        Vitals:  Vitals:    01/24/22 8437 BP: 130/74   BP Location: Left arm   Patient Position: Sitting   Cuff Size: Adult   Pulse: 66   SpO2: 98%   Weight: 109 kg (241 lb)   Height: 6' 1" (1 854 m)     BMI - Body mass index is 31 8 kg/m²  Wt Readings from Last 7 Encounters:   01/24/22 109 kg (241 lb)   12/28/21 107 kg (235 lb 3 2 oz)   11/11/21 105 kg (232 lb)   11/02/21 105 kg (232 lb)   08/05/21 105 kg (231 lb 3 2 oz)   07/12/21 105 kg (231 lb 11 2 oz)   05/05/21 106 kg (233 lb 8 oz)       Physical Exam  Vitals and nursing note reviewed  Constitutional:       General: He is not in acute distress  Appearance: Normal appearance  He is well-developed  He is not ill-appearing or diaphoretic  HENT:      Head: Normocephalic and atraumatic  Nose: No congestion  Eyes:      General: No scleral icterus  Conjunctiva/sclera: Conjunctivae normal    Neck:      Vascular: No carotid bruit or JVD  Cardiovascular:      Rate and Rhythm: Normal rate and regular rhythm  Heart sounds: Normal heart sounds  No murmur heard  No friction rub  No gallop  Comments: Right upper chest wall cardiac device noted in-situ  No swelling or tenderness surrounding it  Pulmonary:      Effort: Pulmonary effort is normal  No respiratory distress  Breath sounds: Normal breath sounds  No wheezing or rales  Chest:      Chest wall: No tenderness  Abdominal:      General: There is no distension  Palpations: Abdomen is soft  Tenderness: There is no abdominal tenderness  Musculoskeletal:         General: No swelling, tenderness or deformity  Cervical back: Neck supple  No muscular tenderness  Right lower leg: No edema  Left lower leg: No edema  Skin:     General: Skin is warm  Neurological:      General: No focal deficit present  Mental Status: He is alert and oriented to person, place, and time  Mental status is at baseline     Psychiatric:         Mood and Affect: Mood normal          Behavior: Behavior normal  Thought Content: Thought content normal        Labs:  CBC:   Lab Results   Component Value Date    WBC 6 87 11/01/2021    RBC 4 40 11/01/2021    HGB 13 6 11/01/2021    HCT 41 6 11/01/2021    MCV 95 11/01/2021     11/01/2021    RDW 13 0 11/01/2021       CMP:   Lab Results   Component Value Date    K 4 1 11/01/2021     11/01/2021    CO2 28 11/01/2021    BUN 16 11/01/2021    CREATININE 1 01 11/01/2021    EGFR 73 11/01/2021    CALCIUM 9 5 11/01/2021    AST 20 11/01/2021    ALT 28 11/01/2021    ALKPHOS 64 11/01/2021       Magnesium:  No results found for: MG    Lipid Profile:   Lab Results   Component Value Date    HDL 59 11/01/2021    TRIG 170 (H) 11/01/2021    LDLCALC 72 11/01/2021       Thyroid Studies:   Lab Results   Component Value Date    VBF4HAAVUUMZ 1 710 11/01/2021       No components found for: HGA1C    No results found for: ULT9    Imaging: No results found  Cardiac testing:   No results found for this or any previous visit  No results found for this or any previous visit  No results found for this or any previous visit  No results found for this or any previous visit

## 2022-02-04 ENCOUNTER — REMOTE DEVICE CLINIC VISIT (OUTPATIENT)
Dept: CARDIOLOGY CLINIC | Facility: CLINIC | Age: 77
End: 2022-02-04
Payer: MEDICARE

## 2022-02-04 DIAGNOSIS — Z95.0 CARDIAC PACEMAKER IN SITU: Primary | ICD-10-CM

## 2022-02-04 PROCEDURE — 93296 REM INTERROG EVL PM/IDS: CPT | Performed by: INTERNAL MEDICINE

## 2022-02-04 PROCEDURE — 93294 REM INTERROG EVL PM/LDLS PM: CPT | Performed by: INTERNAL MEDICINE

## 2022-02-04 NOTE — PROGRESS NOTES
Results for orders placed or performed in visit on 02/04/22   Cardiac EP device report    Narrative    SJM DUAL PM / ACTIVE SYSTEM IS MRI CONDITIONAL  MERLIN TRANSMISSION: BATTERY VOLTAGE ADEQUATE (9 4-10 2 yrs)  AP 53%  42% (>40%/SSS/DDDR 60)  ALL AVAILABLE LEAD PARAMETERS WITHIN NORMAL LIMITS  2 AMS EPISODES WITH MOST RECENT FOR PAT, 8 SEC  DURATION  OTHER EPISODE FOR PAF, 10 SEC  DURATION  AF BURDEN <1%  HX: PAF & PATIENT TAKES ELIQUIS, NO BB OR AAA  PACEMAKER FUNCTIONING APPROPRIATELY    ES

## 2022-03-30 ENCOUNTER — TELEPHONE (OUTPATIENT)
Dept: FAMILY MEDICINE CLINIC | Facility: MEDICAL CENTER | Age: 77
End: 2022-03-30

## 2022-03-30 NOTE — TELEPHONE ENCOUNTER
Triaged- c/o respiratory symptoms for two weeks  expiratory wheezing noted while speaking with patient  fully vaccinated  appt advised- patient request tomorrow afternoon due to prior commitments    appt set up

## 2022-03-31 ENCOUNTER — OFFICE VISIT (OUTPATIENT)
Dept: FAMILY MEDICINE CLINIC | Facility: MEDICAL CENTER | Age: 77
End: 2022-03-31
Payer: MEDICARE

## 2022-03-31 VITALS
DIASTOLIC BLOOD PRESSURE: 70 MMHG | OXYGEN SATURATION: 99 % | RESPIRATION RATE: 17 BRPM | TEMPERATURE: 98.6 F | HEIGHT: 73 IN | BODY MASS INDEX: 31.41 KG/M2 | WEIGHT: 237 LBS | HEART RATE: 68 BPM | SYSTOLIC BLOOD PRESSURE: 110 MMHG

## 2022-03-31 DIAGNOSIS — E78.5 HYPERLIPIDEMIA, UNSPECIFIED HYPERLIPIDEMIA TYPE: Primary | ICD-10-CM

## 2022-03-31 DIAGNOSIS — R06.2 WHEEZING: ICD-10-CM

## 2022-03-31 DIAGNOSIS — Z57.9 OCCUPATIONAL EXPOSURE IN WORKPLACE: ICD-10-CM

## 2022-03-31 DIAGNOSIS — Z12.83 SCREENING EXAM FOR SKIN CANCER: ICD-10-CM

## 2022-03-31 DIAGNOSIS — K21.9 GASTROESOPHAGEAL REFLUX DISEASE, UNSPECIFIED WHETHER ESOPHAGITIS PRESENT: ICD-10-CM

## 2022-03-31 DIAGNOSIS — R09.81 NASAL CONGESTION: ICD-10-CM

## 2022-03-31 PROCEDURE — 99214 OFFICE O/P EST MOD 30 MIN: CPT | Performed by: STUDENT IN AN ORGANIZED HEALTH CARE EDUCATION/TRAINING PROGRAM

## 2022-03-31 PROCEDURE — U0003 INFECTIOUS AGENT DETECTION BY NUCLEIC ACID (DNA OR RNA); SEVERE ACUTE RESPIRATORY SYNDROME CORONAVIRUS 2 (SARS-COV-2) (CORONAVIRUS DISEASE [COVID-19]), AMPLIFIED PROBE TECHNIQUE, MAKING USE OF HIGH THROUGHPUT TECHNOLOGIES AS DESCRIBED BY CMS-2020-01-R: HCPCS | Performed by: STUDENT IN AN ORGANIZED HEALTH CARE EDUCATION/TRAINING PROGRAM

## 2022-03-31 PROCEDURE — U0005 INFEC AGEN DETEC AMPLI PROBE: HCPCS | Performed by: STUDENT IN AN ORGANIZED HEALTH CARE EDUCATION/TRAINING PROGRAM

## 2022-03-31 RX ORDER — OMEPRAZOLE 40 MG/1
40 CAPSULE, DELAYED RELEASE ORAL
Qty: 90 CAPSULE | Refills: 0 | Status: SHIPPED | OUTPATIENT
Start: 2022-03-31

## 2022-03-31 RX ORDER — OMEPRAZOLE 20 MG/1
40 CAPSULE, DELAYED RELEASE ORAL DAILY
Qty: 90 CAPSULE | Refills: 0 | Status: SHIPPED | OUTPATIENT
Start: 2022-03-31 | End: 2022-03-31 | Stop reason: CLARIF

## 2022-03-31 RX ORDER — SIMVASTATIN 10 MG
40 TABLET ORAL
Qty: 90 TABLET | Refills: 0 | Status: SHIPPED | OUTPATIENT
Start: 2022-03-31 | End: 2022-03-31 | Stop reason: CLARIF

## 2022-03-31 RX ORDER — SIMVASTATIN 40 MG
40 TABLET ORAL
Qty: 90 TABLET | Refills: 0 | Status: SHIPPED | OUTPATIENT
Start: 2022-03-31 | End: 2022-04-19 | Stop reason: SDUPTHER

## 2022-03-31 RX ORDER — FLUTICASONE PROPIONATE 50 MCG
1 SPRAY, SUSPENSION (ML) NASAL DAILY
Qty: 9.9 ML | Refills: 0 | Status: SHIPPED | OUTPATIENT
Start: 2022-03-31 | End: 2022-05-25

## 2022-03-31 SDOH — HEALTH STABILITY - PHYSICAL HEALTH: OCCUPATIONAL EXPOSURE TO UNSPECIFIED RISK FACTOR: Z57.9

## 2022-03-31 NOTE — PROGRESS NOTES
[address]  [facility] [place]     Alban Varghese MRN: [id number] : 1945 Age: 78 y o  Assessment/Plan     1  Nasal congestion    - fluticasone (FLONASE) 50 mcg/act nasal spray; 1 spray into each nostril daily for 14 days  Dispense: 9 9 mL; Refill: 0    2  Wheezing    - patient presents today 2 weeks after onset of feeling ill, cough, nasal congestion, chest congestion  Upon exam today, patient's vital signs stable, lung exam also normal   Likely recovering from viral illness  During these 2 weeks he did appreciate some wheezes, today no wheezing on exam so inhaler was not pursued  He did have nasal congestion, advised about Flonase for symptomatic relief  He also requests to see pulmonology as he has some concerns about occupational work exposure in relation to his lungs especially given recent illness he describes  - Complete PFT with post Bronchodilator and Six Minute walk; Future  - Ambulatory Referral to Pulmonology; Future  - COVID Only- Office Collect    3  Occupational exposure in workplace    - Ambulatory Referral to Pulmonology; Future    4  Hyperlipidemia, unspecified hyperlipidemia type    - patient requests refill of Zocor, he is taking at bedtime  - simvastatin (ZOCOR) 40 mg tablet; Take 1 tablet (40 mg total) by mouth daily at bedtime  Dispense: 90 tablet; Refill: 0    5  Gastroesophageal reflux disease, unspecified whether esophagitis present    - patient requests refill of Prilosec  - omeprazole (PriLOSEC) 40 MG capsule; Take 1 capsule (40 mg total) by mouth daily before breakfast  Dispense: 90 capsule; Refill: 0    6  Screening exam for skin cancer    - patient advised about sunscreen application SPF 50 or above if extended periods of sun exposure  Patient also advised to make an appointment with Dermatology for annual exam screening skin cancer   - Ambulatory Referral to Dermatology;  Future      Alban Varghese acknowledged understanding of treatment plan, all questions answered  Subjective     Jerald Deras is a 78 y o  male here for evaluation of "cough and just didn't feel good for 2 weeks, today is the best day I had, I have this chest congestion"  He states Sudafed offered much relief  Over the past 2 weeks, patient states he heard himself wheeze a bit  Denies any chest pain  This week, he returned to work, he states he is feeling better  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 and would like to be evaluated by Pulmonology       The following portions of the patient's history were reviewed and updated as appropriate: allergies, current medications, past family history, past medical history, past social history, past surgical history and problem list      Past Medical History:   Diagnosis Date    Bradycardia     permanent pacemaker    GERD (gastroesophageal reflux disease)     Hearing aid worn     High cholesterol     Hypertension        Allergies   Allergen Reactions    Other Other (See Comments)     LASHAUN       Past Surgical History:   Procedure Laterality Date    CARDIAC PACEMAKER PLACEMENT  2017    sees cardiologist in [place]- last visit was 5/2020 and has a monitor to check pacemaker   [facility]   2018    HERNIA REPAIR      [place] REPAIR RECURR Jigar Wyatt Left 12/11/2020    Procedure: REPAIR HERNIA INGUINAL;  Surgeon: Pb Hendrickson MD;  Location: [address] [place] [address];  Service: General       Family History   Problem Relation Age of Onset    Cancer Mother     Colon cancer Mother     Breast cancer Sister 80       Social History     Socioeconomic History    Marital status:      Spouse name: None    Number of children: None    Years of education: None    Highest education level: None   Occupational History    None   Tobacco Use    Smoking status: Never Smoker    Smokeless tobacco: Never Used   Vaping Use    Vaping Use: Never used   Substance and Sexual Activity    Alcohol use: Yes     Comment: occ    Drug use: Never    Sexual activity: Not Currently   Other Topics Concern    None   Social History Narrative    None     Social Determinants of Health     Financial Resource Strain: Not on file   Food Insecurity: Not on file   Transportation Needs: Not on file   Physical Activity: Not on file   Stress: Not on file   Social Connections: Not on file   Intimate Partner Violence: Not on file   Housing Stability: Not on file       Current Outpatient Medications   Medication Sig Dispense Refill    apixaban (Eliquis) 5 mg Take 1 tablet (5 mg total) by mouth 2 (two) times a day Last dose 12/8/20 90 tablet 3    CALCIUM PO Take by mouth      Ferrous Sulfate (IRON PO) Take by mouth      Glutamine 500 MG CAPS Take by mouth every morning       multivitamin (THERAGRAN) TABS Take 1 tablet by mouth daily      omeprazole (PriLOSEC) 20 mg delayed release capsule Take 40 mg by mouth daily        simvastatin (ZOCOR) 10 mg tablet Take 40 mg by mouth daily at bedtime       vitamin B-12 (CYANOCOBALAMIN) 100 MCG TABS Take 50 mcg by mouth daily       No current facility-administered medications for this visit  Review of Systems   Constitutional: Negative for chills and fever  HENT: Positive for congestion  Respiratory: Negative for shortness of breath  Cough: resolved  Wheezing: not at this time  Cardiovascular: Negative for chest pain  And as noted in HPI    Objective      /70 (BP Location: Left arm, Patient Position: Sitting, Cuff Size: Adult)   Pulse 68   Temp 98 6 °F (37 °C)   Resp 17   Ht 6' 1" (1 854 m)   Wt 108 kg (237 lb)   SpO2 99%   BMI 31 27 kg/m²     Physical Exam  Vitals reviewed  Constitutional:       General: He is not in acute distress  Appearance: He is not toxic-appearing  Comments: Nasal quality to voice    HENT:      Head: Normocephalic and atraumatic        Right Ear: External ear normal       Left Ear: External ear normal       Nose: Congestion present  Rhinorrhea: scant  Mouth/Throat:      Mouth: Mucous membranes are moist       Pharynx: Oropharynx is clear  No oropharyngeal exudate or posterior oropharyngeal erythema  Eyes:      General:         Right eye: No discharge  Left eye: No discharge  Extraocular Movements: Extraocular movements intact  Conjunctiva/sclera: Conjunctivae normal       Pupils: Pupils are equal, round, and reactive to light  Cardiovascular:      Rate and Rhythm: Normal rate and regular rhythm  Pulses: Normal pulses  Heart sounds: Normal heart sounds  Pulmonary:      Effort: Pulmonary effort is normal  No respiratory distress  Breath sounds: Normal breath sounds  No stridor  No wheezing, rhonchi or rales  Musculoskeletal:      Cervical back: Neck supple  Lymphadenopathy:      Cervical: No cervical adenopathy  Skin:     General: Skin is warm and dry  Comments: ephelides   Neurological:      Mental Status: He is alert and oriented to person, place, and time  Psychiatric:         Mood and Affect: Mood normal          Behavior: Behavior normal          Thought Content: Thought content normal          Judgment: Judgment normal              Some portions of this record may have been generated with voice recognition software  There may be translation, syntax, or grammatical errors  Occasional wrong word or "sound-a-like" substitutions may have occurred due to the inherent limitations of the voice recognition software  Read the chart carefully and recognize, using context, where substations may have occurred  If you have any questions, please contact the dictating provider for clarification or correction, as needed

## 2022-04-01 LAB — SARS-COV-2 RNA RESP QL NAA+PROBE: NEGATIVE

## 2022-04-14 ENCOUNTER — HOSPITAL ENCOUNTER (OUTPATIENT)
Dept: GASTROENTEROLOGY | Facility: HOSPITAL | Age: 77
Setting detail: OUTPATIENT SURGERY
Discharge: HOME | End: 2022-04-14
Attending: INTERNAL MEDICINE | Admitting: INTERNAL MEDICINE
Payer: MEDICARE

## 2022-04-14 ENCOUNTER — ANESTHESIA (OUTPATIENT)
Dept: GASTROENTEROLOGY | Facility: HOSPITAL | Age: 77
End: 2022-04-14

## 2022-04-14 ENCOUNTER — ANESTHESIA EVENT (OUTPATIENT)
Dept: GASTROENTEROLOGY | Facility: HOSPITAL | Age: 77
End: 2022-04-14

## 2022-04-14 VITALS
SYSTOLIC BLOOD PRESSURE: 152 MMHG | DIASTOLIC BLOOD PRESSURE: 79 MMHG | HEART RATE: 60 BPM | OXYGEN SATURATION: 99 % | TEMPERATURE: 96.6 F | RESPIRATION RATE: 16 BRPM

## 2022-04-14 DIAGNOSIS — K21.9 GASTROESOPHAGEAL REFLUX DISEASE, UNSPECIFIED WHETHER ESOPHAGITIS PRESENT: ICD-10-CM

## 2022-04-14 DIAGNOSIS — Z12.11 COLON CANCER SCREENING: ICD-10-CM

## 2022-04-14 PROCEDURE — 43239 EGD BIOPSY SINGLE/MULTIPLE: CPT | Performed by: INTERNAL MEDICINE

## 2022-04-14 PROCEDURE — 45380 COLONOSCOPY AND BIOPSY: CPT | Performed by: INTERNAL MEDICINE

## 2022-04-14 PROCEDURE — 88305 TISSUE EXAM BY PATHOLOGIST: CPT | Performed by: PATHOLOGY

## 2022-04-14 RX ORDER — LIDOCAINE HYDROCHLORIDE 10 MG/ML
INJECTION, SOLUTION EPIDURAL; INFILTRATION; INTRACAUDAL; PERINEURAL AS NEEDED
Status: DISCONTINUED | OUTPATIENT
Start: 2022-04-14 | End: 2022-04-14

## 2022-04-14 RX ORDER — PROPOFOL 10 MG/ML
INJECTION, EMULSION INTRAVENOUS CONTINUOUS PRN
Status: DISCONTINUED | OUTPATIENT
Start: 2022-04-14 | End: 2022-04-14

## 2022-04-14 RX ORDER — SODIUM CHLORIDE, SODIUM LACTATE, POTASSIUM CHLORIDE, CALCIUM CHLORIDE 600; 310; 30; 20 MG/100ML; MG/100ML; MG/100ML; MG/100ML
INJECTION, SOLUTION INTRAVENOUS CONTINUOUS PRN
Status: DISCONTINUED | OUTPATIENT
Start: 2022-04-14 | End: 2022-04-14

## 2022-04-14 RX ORDER — PROPOFOL 10 MG/ML
INJECTION, EMULSION INTRAVENOUS AS NEEDED
Status: DISCONTINUED | OUTPATIENT
Start: 2022-04-14 | End: 2022-04-14

## 2022-04-14 RX ADMIN — LIDOCAINE HYDROCHLORIDE 50 MG: 10 INJECTION, SOLUTION EPIDURAL; INFILTRATION; INTRACAUDAL at 09:45

## 2022-04-14 RX ADMIN — PROPOFOL 50 MG: 10 INJECTION, EMULSION INTRAVENOUS at 09:45

## 2022-04-14 RX ADMIN — PROPOFOL 140 MCG/KG/MIN: 10 INJECTION, EMULSION INTRAVENOUS at 09:45

## 2022-04-14 RX ADMIN — SODIUM CHLORIDE, SODIUM LACTATE, POTASSIUM CHLORIDE, AND CALCIUM CHLORIDE: .6; .31; .03; .02 INJECTION, SOLUTION INTRAVENOUS at 09:41

## 2022-04-14 RX ADMIN — PHENYLEPHRINE HYDROCHLORIDE 5 MCG/MIN: 10 INJECTION INTRAVENOUS at 09:45

## 2022-04-14 NOTE — H&P
History and Physical -  [facility]  Lisa Persaud 78 y o  male MRN: [id number]    HPI: Lisa Persaud is a 78y o  year old male who presents for evaluation of chronic GERD and colon cancer screening, has family history of colon cancer and personal history of polyps  Previous colonoscopy was 3 years ago  Review of Systems    Historical Information   Past Medical History:   Diagnosis Date    Bradycardia     permanent pacemaker    Colon polyp     GERD (gastroesophageal reflux disease)     Hearing aid worn     High cholesterol     Hypertension      Past Surgical History:   Procedure Laterality Date    CARDIAC PACEMAKER PLACEMENT  2017    sees cardiologist in [place]- last visit was 5/2020 and has a monitor to check pacemaker   [address] [facility] [address]  2018    HERNIA REPAIR      [place] REPAIR RECURR Estel Tabatha Left 12/11/2020    Procedure: REPAIR HERNIA INGUINAL;  Surgeon: Loretta Velázquez MD;  Location: [address] [place] [address];  Service: General     Social History   Social History     Substance and Sexual Activity   Alcohol Use Yes    Comment: occ     Social History     Substance and Sexual Activity   Drug Use Never     Social History     Tobacco Use   Smoking Status Never Smoker   Smokeless Tobacco Never Used     Family History   Problem Relation Age of Onset    Cancer Mother     Colon cancer Mother    [place] Breast cancer Sister 80       Meds/Allergies     (Not in a hospital admission)      Allergies   Allergen Reactions    Other Other (See Comments)     WALNUT       Objective     /74   Pulse 68   Temp (!) 96 7 °F (35 9 °C) (Temporal)   Resp 18   SpO2 97%       PHYSICAL EXAM    Gen: NAD  CV: RRR  CHEST: Clear  ABD: soft, NT/ND  EXT: no edema  Neuro: AAO      ASSESSMENT/PLAN:  This is a 78y o  year old male here for colon cancer screening and evaluation of GERD  PLAN:   Procedure:  EGD and colonoscopy

## 2022-04-14 NOTE — DISCHARGE INSTRUCTIONS
Diverticulosis   WHAT YOU NEED TO KNOW:   Diverticulosis is a condition that causes small pockets called diverticula to form in your intestine  These pockets make it difficult for bowel movements to pass through your digestive system  DISCHARGE INSTRUCTIONS:   Seek care immediately if:   · You have severe pain on the left side of your lower abdomen  · Your bowel movements are bright or dark red  Call your doctor if:   · You have a fever and chills  · You feel dizzy or lightheaded  · You have nausea, or you are vomiting  · You have a change in your bowel movements  · You have questions or concerns about your condition or care  Medicines:   · Medicines  to soften your bowel movements may be given  You may also need medicines to treat symptoms such as bloating and pain  · Take your medicine as directed  Contact your healthcare provider if you think your medicine is not helping or if you have side effects  Tell him or her if you are allergic to any medicine  Keep a list of the medicines, vitamins, and herbs you take  Include the amounts, and when and why you take them  Bring the list or the pill bottles to follow-up visits  Carry your medicine list with you in case of an emergency  Self-care: The goal of treatment is to manage any symptoms you have and prevent other problems such as diverticulitis  Diverticulitis is swelling or infection of the diverticula  Your healthcare provider may recommend any of the following:  · Eat a variety of high-fiber foods  High-fiber foods help you have regular bowel movements  High-fiber foods include cooked beans, fruits, vegetables, and some cereals  Most adults need 25 to 35 grams of fiber each day  Your healthcare provider may recommend that you have more  Ask your healthcare provider how much fiber you need  Increase fiber slowly  You may have abdominal discomfort, bloating, and gas if you add fiber to your diet too quickly   You may need to take a fiber supplement if you are not getting enough fiber from food  · Drink liquids as directed  You may need to drink 2 to 3 liters (8 to 12 cups) of liquids every day  Ask your healthcare provider how much liquid to drink each day and which liquids are best for you  · Apply heat  on your abdomen for 20 to 30 minutes every 2 hours for as many days as directed  Heat helps decrease pain and muscle spasms  Help prevent diverticulitis or other symptoms: The following may help decrease your risk for diverticulitis or symptoms, such as bleeding  Talk to your provider about these or other things you can do to prevent problems that may occur with diverticulosis  · Exercise regularly  Ask your healthcare provider about the best exercise plan for you  Exercise can help you have regular bowel movements  Get 30 minutes of exercise on most days of the week  · Maintain a healthy weight  Ask your healthcare provider what a healthy weight is for you  Ask him or her to help you create a weight loss plan if you are overweight  · Do not smoke  Nicotine and other chemicals in cigarettes increase your risk for diverticulitis  Ask your healthcare provider for information if you currently smoke and need help to quit  E-cigarettes or smokeless tobacco still contain nicotine  Talk to your healthcare provider before you use these products  · Ask your healthcare provider if it is safe to take NSAIDs  NSAIDs may increase your risk of diverticulitis  Follow up with your doctor as directed:  Write down your questions so you remember to ask them during your visits  © Copyright [employer] 2022 Information is for End User's use only and may not be sold, redistributed or otherwise used for commercial purposes  All illustrations and images included in CareNotes® are the copyrighted property of A D A M , Inc  or [employer]   The above information is an  only   It is not intended as medical advice for individual conditions or treatments  Talk to your doctor, nurse or pharmacist before following any medical regimen to see if it is safe and effective for you  Colorectal Polyps   WHAT YOU NEED TO KNOW:   Colorectal polyps are small growths of tissue in the lining of the colon and rectum  Most polyps are hyperplastic polyps and are usually benign (noncancerous)  Certain types of polyps, called adenomatous polyps, may turn into cancer  DISCHARGE INSTRUCTIONS:   Follow up with your healthcare provider or gastroenterologist as directed: You may need to return for more tests, such as another colonoscopy  Write down your questions so you remember to ask them during your visits  Reduce your risk for colorectal polyps:   · Eat a variety of healthy foods:  Healthy foods include fruit, vegetables, whole-grain breads, low-fat dairy products, beans, lean meat, and fish  Ask if you need to be on a special diet  · Maintain a healthy weight:  Ask your healthcare provider if you need to lose weight and how much you need to lose  Ask for help with a weight loss program     · Exercise:  Begin to exercise slowly and do more as you get stronger  Talk with your healthcare provider before you start an exercise program      · Limit alcohol:  Your risk for polyps increases the more you drink  · Do not smoke: If you smoke, it is never too late to quit  Ask for information about how to stop  For support and more information:   · Flor May ([facility]) [address], [place] [address]  Phone: 9- 007 - [phone]  Web Address: www digestive  niddk nih gov    Contact your healthcare provider or gastroenterologist if:   · You have a fever  · You have chills, a cough, or feel weak and achy  · You have abdominal pain that does not go away or gets worse after you take medicine  · Your abdomen is swollen       · You are losing weight without trying  · You have questions or concerns about your condition or care  Seek care immediately or call 911 if:   · You have sudden shortness of breath  · You have a fast heart rate, fast breathing, or are too dizzy to stand up  · You have severe abdominal pain  · You see blood in your bowel movement  © Copyright [address] Information is for End User's use only and may not be sold, redistributed or otherwise used for commercial purposes  All illustrations and images included in CareNotes® are the copyrighted property of [employer] , Inc  or [facility] Justo Cherry   The above information is an  only  It is not intended as medical advice for individual conditions or treatments  Talk to your doctor, nurse or pharmacist before following any medical regimen to see if it is safe and effective for you  Hiatal Hernia   WHAT YOU NEED TO KNOW:   A hiatal hernia is a condition that causes part of your stomach to bulge through the hiatus (small opening) in your diaphragm  The part of the stomach may move up and down, or it may get trapped above the diaphragm  DISCHARGE INSTRUCTIONS:   Call your local emergency number (911 in the [address] [place] [address]) if:   · You have severe chest pain and sudden trouble breathing  Seek care immediately if:   · You have severe abdominal pain  · You try to vomit but nothing comes out (retching)  · Your bowel movements are black or bloody  · Your vomit looks like coffee grounds or has blood in it  Call your doctor if:   · Your symptoms are getting worse  · You have nausea, and you are vomiting  · You are losing weight without trying  · You have questions or concerns about your condition or care  Medicines:   · Medicines  may be given to relieve heartburn symptoms  These medicines help to decrease or block stomach acid  You may also be given medicines that help to tighten the esophageal sphincter  · Take your medicine as directed  Contact your healthcare provider if you think your medicine is not helping or if you have side effects  Tell him or her if you are allergic to any medicine  Keep a list of the medicines, vitamins, and herbs you take  Include the amounts, and when and why you take them  Bring the list or the pill bottles to follow-up visits  Carry your medicine list with you in case of an emergency  Self care: The following nutrition and lifestyle changes may be recommended to relieve symptoms of heartburn:     · Avoid foods that make your symptoms worse  These may include spicy foods, fruit juices, alcohol, caffeine, chocolate, and mint  · Eat several small meals during the day  Small meals give your stomach less food to digest     · Avoid lying down and bending forward after you eat  Do not eat meals 2 to 3 hours before bedtime  This decreases your risk for reflux  · Maintain a healthy weight  If you are overweight, weight loss may help relieve your symptoms  · Sleep with your head elevated  at least 6 inches  · Do not smoke  Smoking can increase your symptoms of heartburn  Follow up with your doctor as directed:  Write down your questions so you remember to ask them during your visits  © Copyright [employer] 2022 Information is for End User's use only and may not be sold, redistributed or otherwise used for commercial purposes  All illustrations and images included in CareNotes® are the copyrighted property of [employer]  or [employer]  The above information is an  only  It is not intended as medical advice for individual conditions or treatments  Talk to your doctor, nurse or pharmacist before following any medical regimen to see if it is safe and effective for you

## 2022-04-14 NOTE — ANESTHESIA POSTPROCEDURE EVALUATION
Post-Op Assessment Note    CV Status:  Stable  Pain Score: 0    Pain management: adequate     Mental Status:  Alert and awake   Hydration Status:  Stable   PONV Controlled:  None   Airway Patency:  Patent      Post Op Vitals Reviewed: Yes      Staff: CRNA         No complications documented      BP   133/68   Temp     Pulse 60   Resp 14   SpO2 99

## 2022-04-14 NOTE — ANESTHESIA PREPROCEDURE EVALUATION
Procedure:  COLONOSCOPY  EGD    Relevant Problems   CARDIO   (+) Sick sinus syndrome (HCC)      GI/HEPATIC   (+) Gastroesophageal reflux disease      Other   (+) Cardiomyopathy, unspecified type (HCC)   (+) Sensorineural hearing loss (SNHL) of both ears     Stress test showed small-moderate reversible myocardial ischemia and patient was evaluated by cardiology after this finding  Conclusion was to continue to monitor as symptoms (SOB) were mild with ok exercise tolerance      Last device interrogation (2/2022)   [facility] dual PM  A-paced (50%) and V-paced (50%) A-fib burden less than 1%      Stress ECHO (6/2021)  SUMMARY:  -  Rest ECG: AV sequential pacing  -  Stress results: There was no chest pain during stress  -  ECG conclusions: The stress ECG was non-diagnostic due to demand paced rhythm and baseline ST/T wave changes  -  Perfusion imaging: There was a small to moderate, moderately severe, partially reversible myocardial perfusion defect of apical , inferolateral and basal inferoseptal wall  -  Gated SPECT: The calculated left ventricular ejection fraction was 45 %  Left ventricular ejection fraction was mildly decreased by visual estimate  There was mildly reduced myocardial thickening and motion of apical and inferoseptal  wall     IMPRESSIONS: Abnormal study after pharmacologic vasodilation without reproduction of symptoms    Stress ekg non diagnostic due to baseline ST/T wave changes and demand ventricular pacing There was a small to moderate, moderately severe, partially reversible myocardial perfusion defect of apical , inferolateral and basal inferoseptal  wall suggestive of ischemia  LV systolic function is mildly reduced with regional wall motion abnormalities in apical and inferoseptal wall     Physical Exam    Airway  Comment: Small mouth  Mallampati score: II  TM Distance: <3 FB  Neck ROM: full     Dental   No notable dental hx     Cardiovascular      Pulmonary      Other Findings        Anesthesia Plan  ASA Score- 3     Anesthesia Type- IV sedation with anesthesia with ASA Monitors  Additional Monitors:   Airway Plan:     Comment: Npo after 05:30(PO bowel prep and water)    Last of eliquis on 4/12  Plan Factors-Exercise tolerance (METS): <4 METS  Exercise comment: Hernia, mild SOB with exertion  Chart reviewed  Patient summary reviewed  Patient is not a current smoker  Induction- intravenous  Postoperative Plan-     Informed Consent- Anesthetic plan and risks discussed with patient  I personally reviewed this patient with the CRNA  Discussed and agreed on the Anesthesia Plan with the CRNA  Noa Cadet

## 2022-05-05 ENCOUNTER — TELEPHONE (OUTPATIENT)
Dept: GASTROENTEROLOGY | Facility: AMBULARY SURGERY CENTER | Age: 77
End: 2022-05-05

## 2022-05-05 NOTE — TELEPHONE ENCOUNTER
----- Message from Jose Fontana MD sent at 5/5/2022  7:24 AM EDT -----  Please inform the patient that the biopsies were negative for H pylori bacteria, polyp in the stomach was benign, no evidence of eosinophilic esophagitis  There was no evidence of Urban's esophagus on biopsies despite the appearance on endoscopy  I would recommend however to continue omeprazole 40 mg given endoscopic concern for Urban's, may consider repeating EGD in 3 years  Avoid NSAIDs, follow anti-reflux measures  Polyp in the colon was also benign, recommend repeat colonoscopy in 3 years-would recommend 2 day prep for future procedure

## 2022-05-06 ENCOUNTER — REMOTE DEVICE CLINIC VISIT (OUTPATIENT)
Dept: CARDIOLOGY CLINIC | Facility: CLINIC | Age: 77
End: 2022-05-06
Payer: MEDICARE

## 2022-05-06 DIAGNOSIS — Z95.0 CARDIAC PACEMAKER IN SITU: Primary | ICD-10-CM

## 2022-05-06 PROCEDURE — 93296 REM INTERROG EVL PM/IDS: CPT | Performed by: INTERNAL MEDICINE

## 2022-05-06 PROCEDURE — 93294 REM INTERROG EVL PM/LDLS PM: CPT | Performed by: INTERNAL MEDICINE

## 2022-06-18 DIAGNOSIS — I48.0 PAF (PAROXYSMAL ATRIAL FIBRILLATION) (HCC): ICD-10-CM

## 2022-06-20 RX ORDER — APIXABAN 5 MG/1
TABLET, FILM COATED ORAL
Qty: 90 TABLET | Refills: 3 | Status: SHIPPED | OUTPATIENT
Start: 2022-06-20 | End: 2022-08-01

## 2022-07-19 DIAGNOSIS — R09.81 NASAL CONGESTION: ICD-10-CM

## 2022-07-19 RX ORDER — FLUTICASONE PROPIONATE 50 MCG
SPRAY, SUSPENSION (ML) NASAL
Qty: 16 G | Refills: 1 | Status: SHIPPED | OUTPATIENT
Start: 2022-07-19 | End: 2022-09-12

## 2022-07-29 DIAGNOSIS — I48.0 PAF (PAROXYSMAL ATRIAL FIBRILLATION) (HCC): ICD-10-CM

## 2022-08-01 RX ORDER — APIXABAN 5 MG/1
TABLET, FILM COATED ORAL
Qty: 90 TABLET | Refills: 3 | Status: SHIPPED | OUTPATIENT
Start: 2022-08-01

## 2022-08-15 ENCOUNTER — IN-CLINIC DEVICE VISIT (OUTPATIENT)
Dept: CARDIOLOGY CLINIC | Facility: CLINIC | Age: 77
End: 2022-08-15
Payer: MEDICARE

## 2022-08-15 DIAGNOSIS — Z95.0 PRESENCE OF CARDIAC PACEMAKER: Primary | ICD-10-CM

## 2022-08-15 PROCEDURE — 93280 PM DEVICE PROGR EVAL DUAL: CPT | Performed by: INTERNAL MEDICINE

## 2022-08-15 NOTE — PROGRESS NOTES
Results for orders placed or performed in visit on 08/15/22   Cardiac EP device report    Narrative    [facility] DUAL PM / ACTIVE SYSTEM IS MRI CONDITIONAL  DEVICE INTERROGATED IN THE [place] OFFICE: BATTERY VOLTAGE ADEQUATE 95 1 YRS)  AP: 61%  : 58% (>40%~DDDR/60)  ALL LEAD PARAMETERS WITHIN NORMAL LIMITS  NO SIGNIFICANT HIGH RATE EPISODES  NO PROGRAMMING CHANGES MADE TO DEVICE PARAMETERS  PACEMAKER FUNCTIONING APPROPRIATELY    [address] [place] [address]

## 2022-09-12 DIAGNOSIS — R09.81 NASAL CONGESTION: ICD-10-CM

## 2022-09-12 RX ORDER — FLUTICASONE PROPIONATE 50 MCG
SPRAY, SUSPENSION (ML) NASAL
Qty: 16 G | Refills: 1 | Status: SHIPPED | OUTPATIENT
Start: 2022-09-12

## 2022-10-12 PROBLEM — Z11.59 NEED FOR HEPATITIS C SCREENING TEST: Status: RESOLVED | Noted: 2021-08-05 | Resolved: 2022-10-12

## 2022-10-12 PROBLEM — R05.9 COUGH: Status: RESOLVED | Noted: 2021-08-05 | Resolved: 2022-10-12

## 2022-10-12 PROBLEM — Z12.11 COLON CANCER SCREENING: Status: RESOLVED | Noted: 2021-08-05 | Resolved: 2022-10-12

## 2022-11-03 ENCOUNTER — OFFICE VISIT (OUTPATIENT)
Dept: CARDIOLOGY CLINIC | Facility: MEDICAL CENTER | Age: 77
End: 2022-11-03

## 2022-11-03 VITALS
HEART RATE: 72 BPM | WEIGHT: 231 LBS | OXYGEN SATURATION: 98 % | BODY MASS INDEX: 30.62 KG/M2 | HEIGHT: 73 IN | DIASTOLIC BLOOD PRESSURE: 72 MMHG | SYSTOLIC BLOOD PRESSURE: 122 MMHG

## 2022-11-03 DIAGNOSIS — I48.0 PAF (PAROXYSMAL ATRIAL FIBRILLATION) (HCC): Primary | ICD-10-CM

## 2022-11-03 DIAGNOSIS — R42 DIZZINESS: ICD-10-CM

## 2022-11-03 DIAGNOSIS — R04.0 RECURRENT EPISTAXIS: ICD-10-CM

## 2022-11-03 DIAGNOSIS — Z95.0 CARDIAC PACEMAKER IN SITU: ICD-10-CM

## 2022-11-03 DIAGNOSIS — E78.2 MIXED HYPERLIPIDEMIA: ICD-10-CM

## 2022-11-03 NOTE — PROGRESS NOTES
[facility] [place]  MARKIE Arias [address] [place] [id number]  Phone#  [phone]  Fax#  [phone]                                              Cardiology Office Follow up  Bernie Almanza, 78 y o  male  YOB: 1945  MRN: [id number] Encounter: [id number]      PCP - Maryland Able, DO    Assessment  Shortness of breath  Nuclear Rx stress - 6/2021 - EF 45%, Small to moderate-sized moderately severe, partially reversible perfusion defect of apical inferolateral /basal inferoseptal wall, possibly related to artifact, but cannot exclude ischemia  Echo - 6/10/21 - EF 50-55%, Grade 1 DD, possible hypokinesis of apex, mild MR  Abnormal ECG  Deep TWI in anterolateral leads during native conduction - ?memory TWI related to pacing  s/p [employer]  Otto cardiac pacemaker - dual-chamber, right-sided  For SSS - had 3 5 second pause on holter monitor --> pacemaker (2018)  Paroxysmal Atrial Fibrillation   Hypertension  Hyperlipidemia  GERD  S/p left inguinal hernia repair (12/2020)    Plan  Paroxysmal Atrial Fibrillation, s/p St Otto dual chamber pacemaker, SSS  No recent AFib on the pacemaker check in 8/2022  Personally reviewed device interrogation  Concerned about undersensing of Afib, and true burden maybe higher  He now reports having small volume epistaxis quite frequently, but no major hospitalizations for this  Referral provided to see ENT to assess for any pathological reason other than the anticoagulation  For now will continue on Eliquis 5 mg b i d  Dizziness  Single episode about 2-3 weeks ago, where he had nausea and vomiting and then had some dizziness  No near-syncope or syncope  He did not seek medical attention and has self-resolved thereafter  No alerts received from pacemaker so far  Will follow up on upcoming device check  Monitor for symptoms, and if has recurrence of dizziness then he will call us back    Hyperlipidemia, Obesity - Body mass index is 30 48 kg/m²  Mildly elevated triglyceride levels  Continue simvastatin 40 mg daily  Dietary modifications and weight loss    ECG today -  Results for orders placed or performed in visit on 11/03/22   POCT ECG    Impression    AV dual paced rhythm        Orders Placed This Encounter   Procedures   • CBC and Platelet   • Lipid Panel with Direct LDL reflex   • Ambulatory Referral to Otolaryngology   • POCT ECG       Return in about 6 months (around 5/3/2023), or if symptoms worsen or fail to improve  History of Present Illness   78 y o   gentleman comes in as a new patient for consultation regarding ongoing symptoms of shortness of breath with exertion  He trades classic cars, 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  He is constantly back and forth between here and [place], and all of his cardiac care so far has been with a cardiologist (Mindi Joe) in [place], [place]  He reports complains of shortness of breath with exertion, which has been gradually worsening over the past year  He believes it is related to his weight gain and inactivity recently, but  It got worse to a point that he was short of breath with even walking short distances, and as a result he could not go to a trade show recently  No complains of chest pain, nausea or diaphoresis  Due to worsening symptoms, he wanted to get evaluated further and as a result is here for evaluation  No known prior history of CAD  No family history of early CAD  He does not smoke  He reports seeing the cardiologist in [place] due to irregular heartbeats, and cardiac monitors in the past   On one of the cardiac monitor, he was noted to have a 3 5 second pause, as a result of which he was I must to get urgently admitted for a pacemaker in 2018  Interval history - 7/12/2021   he comes back for follow-up after about 2 months  He completed his testing, and is here to discuss results    He continues to report shortness of breath with moderate to severe exertion, but is stable or slightly better  He reports that he went to a car show over the weekend, and had to walk for miles throughout does days, and did not have any major problems with it  He had painted his home over the prior weekend, and reports not having some coughing after this, which is getting better as well  No complains of orthopnea or PND  No complains of chest pain  Interval history - 1/24/2022  He comes back for follow-up after about 6 months  He has been doing well overall and has not had any major complains of chest pain, shortness of breath or any persistent palpitations  He continues to remain active although does not exercise routinely  He states that he goes to car shows frequently, and has to walk long distances during this and does not report any problems with it  Interval history - 11/3/2022  He comes back for follow-up after about 10 months  He has overall been doing well and has not had any symptoms of chest pain, shortness of breath, palpitations  He does report having had 1 episode of dizziness about 2-3 weeks ago, which was associated with some nausea and vomiting  It self resolved and he did not seek medical attention  Over the last 2 weeks he has not had any further recurrences of this  No complains of chest pain, palpitations associated with that episode          Historical Information   Past Medical History:   Diagnosis Date   • Bradycardia     permanent pacemaker   • Colon polyp    • GERD (gastroesophageal reflux disease)    • Hearing aid worn    • High cholesterol    • Hypertension      Past Surgical History:   Procedure Laterality Date   • CARDIAC PACEMAKER PLACEMENT  2017    sees cardiologist in [place]- last visit was 5/2020 and has a monitor to check pacemaker   • CARDIAC PACEMAKER PLACEMENT     • COLONOSCOPY     • CYSTOSCOPY  2018   • HERNIA REPAIR     • [facility] Left 12/11/2020    Procedure: REPAIR HERNIA INGUINAL;  Surgeon: Kennedi Ramon MD;  Location: [facility];  Service: General     Family History   Problem Relation Age of Onset   • Cancer Mother    • Colon cancer Mother    • Breast cancer Sister 80     Current Outpatient Medications on File Prior to Visit   Medication Sig Dispense Refill   • CALCIUM PO Take by mouth     • Eliquis 5 MG TAKE 1 TABLET BY MOUTH TWO TIMES A DAY 90 tablet 3   • Ferrous Sulfate (IRON PO) Take by mouth     • fluticasone (FLONASE) 50 mcg/act nasal spray INSTILL ONE SPRAY INTO EACH NOSTRIL DAILY FOR 14 DAYS 16 g 1   • Glutamine 500 MG CAPS Take by mouth every morning      • multivitamin (THERAGRAN) TABS Take 1 tablet by mouth daily     • omeprazole (PriLOSEC) 40 MG capsule Take 1 capsule (40 mg total) by mouth daily before breakfast 90 capsule 0   • simvastatin (ZOCOR) 40 mg tablet Take 1 tablet (40 mg total) by mouth daily at bedtime 90 tablet 0   • vitamin B-12 (CYANOCOBALAMIN) 100 MCG TABS Take 50 mcg by mouth daily       No current facility-administered medications on file prior to visit       Allergies   Allergen Reactions   • Other Other (See Comments)     WALNUT     Social History     Socioeconomic History   • Marital status:      Spouse name: None   • Number of children: None   • Years of education: None   • Highest education level: None   Occupational History   • None   Tobacco Use   • Smoking status: Never Smoker   • Smokeless tobacco: Never Used   Vaping Use   • Vaping Use: Never used   Substance and Sexual Activity   • Alcohol use: Yes     Comment: occ   • Drug use: Never   • Sexual activity: Not Currently   Other Topics Concern   • None   Social History Narrative   • None     Social Determinants of Health     Financial Resource Strain: Not on file   Food Insecurity: Not on file   Transportation Needs: Not on file   Physical Activity: Not on file   Stress: Not on file   Social Connections: Not on file   Intimate Partner Violence: Not on file   Housing Stability: Not on file        Review of Systems   All other systems reviewed and are negative  Vitals:  Vitals:    11/03/22 1527   BP: 122/72   BP Location: Left arm   Patient Position: Sitting   Cuff Size: Adult   Pulse: 72   SpO2: 98%   Weight: 105 kg (231 lb)   Height: 6' 1" (1 854 m)     BMI - Body mass index is 30 48 kg/m²  Wt Readings from Last 7 Encounters:   11/03/22 105 kg (231 lb)   03/31/22 108 kg (237 lb)   01/24/22 109 kg (241 lb)   12/28/21 107 kg (235 lb 3 2 oz)   11/11/21 105 kg (232 lb)   11/02/21 105 kg (232 lb)   08/05/21 105 kg (231 lb 3 2 oz)       Physical Exam  Vitals and nursing note reviewed  Constitutional:       General: He is not in acute distress  Appearance: Normal appearance  He is well-developed  He is not ill-appearing or diaphoretic  HENT:      Head: Normocephalic and atraumatic  Nose: No congestion  Eyes:      General: No scleral icterus  Conjunctiva/sclera: Conjunctivae normal    Neck:      Vascular: No carotid bruit or JVD  Cardiovascular:      Rate and Rhythm: Normal rate and regular rhythm  Heart sounds: Normal heart sounds  No murmur heard  No friction rub  No gallop  Comments: Right upper chest wall cardiac device noted in-situ  No swelling or tenderness surrounding it  Pulmonary:      Effort: Pulmonary effort is normal  No respiratory distress  Breath sounds: Normal breath sounds  No wheezing or rales  Chest:      Chest wall: No tenderness  Abdominal:      General: There is no distension  Palpations: Abdomen is soft  Tenderness: There is no abdominal tenderness  Musculoskeletal:         General: No swelling, tenderness or deformity  Cervical back: Neck supple  No muscular tenderness  Right lower leg: No edema  Left lower leg: No edema  Skin:     General: Skin is warm  Neurological:      General: No focal deficit present  Mental Status: He is alert and oriented to person, place, and time  Mental status is at baseline  Psychiatric:         Mood and Affect: Mood normal          Behavior: Behavior normal          Thought Content: Thought content normal        Labs:  CBC:   Lab Results   Component Value Date    WBC 6 87 11/01/2021    RBC 4 40 11/01/2021    HGB 13 6 11/01/2021    HCT 41 6 11/01/2021    MCV 95 11/01/2021     11/01/2021    RDW 13 0 11/01/2021       CMP:   Lab Results   Component Value Date    K 4 1 11/01/2021     11/01/2021    CO2 28 11/01/2021    BUN 16 11/01/2021    CREATININE 1 01 11/01/2021    EGFR 73 11/01/2021    CALCIUM 9 5 11/01/2021    AST 20 11/01/2021    ALT 28 11/01/2021    ALKPHOS 64 11/01/2021       Magnesium:  No results found for: MG    Lipid Profile:   Lab Results   Component Value Date    HDL 59 11/01/2021    TRIG 170 (H) 11/01/2021    LDLCALC 72 11/01/2021       Thyroid Studies:   Lab Results   Component Value Date    QRB9VGJCARII 1 710 11/01/2021       No components found for: HGA1C    No results found for: NKK2    Imaging: No results found  Cardiac testing:   No results found for this or any previous visit  No results found for this or any previous visit  No results found for this or any previous visit  No results found for this or any previous visit

## 2022-11-17 ENCOUNTER — IN-CLINIC DEVICE VISIT (OUTPATIENT)
Dept: CARDIOLOGY CLINIC | Facility: CLINIC | Age: 77
End: 2022-11-17

## 2022-11-17 DIAGNOSIS — Z95.0 PRESENCE OF PERMANENT CARDIAC PACEMAKER: Primary | ICD-10-CM

## 2022-11-17 NOTE — PROGRESS NOTES
Results for orders placed or performed in visit on 11/17/22   Cardiac EP device report    Narrative    [facility] DUAL PM / ACTIVE SYSTEM IS MRI CONDITIONAL  DEVICE INTERROGATED IN THE [place] OFFICE (NO TEST); BATTERY VOLTAGE ADEQUATE (4 8-5 2 YRS)  AP 46%  56% (>40%/DDDR 60); ALL AVAILABLE LEAD PARAMETERS WITHIN NORMAL LIMITS  1 AMS EVENT WITH PAF ON EGM, DURATION 1 HR 57 MINS  1 VT-NS EVENT (11/6/22) WITH NSVT ON EGM, 22 BEATS @  BPM  EF 50-55% (6/10/2021 ECHO); PATIENT ON ELIQUIS, NO AV SRIKANTH BLOCKER  TASK TO DR LR  PACEMAKER FUNCTIONING APPROPRIATELY    ES

## 2023-01-13 DIAGNOSIS — R09.81 NASAL CONGESTION: ICD-10-CM

## 2023-01-13 RX ORDER — FLUTICASONE PROPIONATE 50 MCG
SPRAY, SUSPENSION (ML) NASAL
Qty: 16 G | Refills: 1 | Status: SHIPPED | OUTPATIENT
Start: 2023-01-13

## 2023-01-16 ENCOUNTER — APPOINTMENT (OUTPATIENT)
Dept: LAB | Facility: MEDICAL CENTER | Age: 78
End: 2023-01-16

## 2023-01-16 ENCOUNTER — OFFICE VISIT (OUTPATIENT)
Dept: FAMILY MEDICINE CLINIC | Facility: MEDICAL CENTER | Age: 78
End: 2023-01-16

## 2023-01-16 VITALS
BODY MASS INDEX: 31.97 KG/M2 | HEART RATE: 74 BPM | WEIGHT: 241.2 LBS | HEIGHT: 73 IN | DIASTOLIC BLOOD PRESSURE: 88 MMHG | OXYGEN SATURATION: 97 % | SYSTOLIC BLOOD PRESSURE: 142 MMHG

## 2023-01-16 DIAGNOSIS — Z00.00 MEDICARE ANNUAL WELLNESS VISIT, SUBSEQUENT: Primary | ICD-10-CM

## 2023-01-16 DIAGNOSIS — Z13.6 SCREENING FOR AAA (ABDOMINAL AORTIC ANEURYSM): ICD-10-CM

## 2023-01-16 DIAGNOSIS — R04.0 RECURRENT EPISTAXIS: ICD-10-CM

## 2023-01-16 DIAGNOSIS — Z51.81 MEDICATION MONITORING ENCOUNTER: ICD-10-CM

## 2023-01-16 DIAGNOSIS — M25.511 CHRONIC RIGHT SHOULDER PAIN: ICD-10-CM

## 2023-01-16 DIAGNOSIS — E78.2 MIXED HYPERLIPIDEMIA: ICD-10-CM

## 2023-01-16 DIAGNOSIS — R53.82 CHRONIC FATIGUE: ICD-10-CM

## 2023-01-16 DIAGNOSIS — Z23 ENCOUNTER FOR IMMUNIZATION: ICD-10-CM

## 2023-01-16 DIAGNOSIS — G89.29 CHRONIC RIGHT SHOULDER PAIN: ICD-10-CM

## 2023-01-16 LAB
ALBUMIN SERPL BCP-MCNC: 3.8 G/DL (ref 3.5–5)
ALP SERPL-CCNC: 63 U/L (ref 46–116)
ALT SERPL W P-5'-P-CCNC: 28 U/L (ref 12–78)
ANION GAP SERPL CALCULATED.3IONS-SCNC: 5 MMOL/L (ref 4–13)
AST SERPL W P-5'-P-CCNC: 24 U/L (ref 5–45)
BILIRUB SERPL-MCNC: 0.7 MG/DL (ref 0.2–1)
BUN SERPL-MCNC: 15 MG/DL (ref 5–25)
CALCIUM SERPL-MCNC: 9.1 MG/DL (ref 8.3–10.1)
CHLORIDE SERPL-SCNC: 105 MMOL/L (ref 96–108)
CHOLEST SERPL-MCNC: 159 MG/DL
CO2 SERPL-SCNC: 26 MMOL/L (ref 21–32)
CREAT SERPL-MCNC: 0.91 MG/DL (ref 0.6–1.3)
ERYTHROCYTE [DISTWIDTH] IN BLOOD BY AUTOMATED COUNT: 13.1 % (ref 11.6–15.1)
GFR SERPL CREATININE-BSD FRML MDRD: 82 ML/MIN/1.73SQ M
GLUCOSE P FAST SERPL-MCNC: 99 MG/DL (ref 65–99)
HCT VFR BLD AUTO: 38.3 % (ref 36.5–49.3)
HDLC SERPL-MCNC: 57 MG/DL
HGB BLD-MCNC: 12.4 G/DL (ref 12–17)
LDLC SERPL CALC-MCNC: 77 MG/DL (ref 0–100)
MCH RBC QN AUTO: 30.2 PG (ref 26.8–34.3)
MCHC RBC AUTO-ENTMCNC: 32.4 G/DL (ref 31.4–37.4)
MCV RBC AUTO: 93 FL (ref 82–98)
PLATELET # BLD AUTO: 210 THOUSANDS/UL (ref 149–390)
PMV BLD AUTO: 11 FL (ref 8.9–12.7)
POTASSIUM SERPL-SCNC: 4.4 MMOL/L (ref 3.5–5.3)
PROT SERPL-MCNC: 7.2 G/DL (ref 6.4–8.4)
RBC # BLD AUTO: 4.11 MILLION/UL (ref 3.88–5.62)
SODIUM SERPL-SCNC: 136 MMOL/L (ref 135–147)
TRIGL SERPL-MCNC: 125 MG/DL
TSH SERPL DL<=0.05 MIU/L-ACNC: 2.61 UIU/ML (ref 0.45–4.5)
WBC # BLD AUTO: 5.21 THOUSAND/UL (ref 4.31–10.16)

## 2023-01-16 NOTE — PROGRESS NOTES
Assessment and Plan:     Problem List Items Addressed This Visit        Other    Medicare annual wellness visit, subsequent - Primary     · Discussed COVID-19 bivalent vaccine  · Patient states he believes he received first shingles vaccination in [place], advised patient if this was Shingrix vaccination series he should complete second dose        Other Visit Diagnoses     Encounter for immunization        Relevant Orders    Pneumococcal Conjugate Vaccine 20-valent (PCV20) (Completed)    Medication monitoring encounter        Relevant Orders    Comprehensive metabolic panel    Chronic fatigue        Relevant Orders    TSH, 3rd generation with Free T4 reflex    Chronic right shoulder pain        Relevant Orders    Ambulatory Referral to Sports Medicine    Screening for AAA (abdominal aortic aneurysm)        Relevant Orders     abdominal aorta screening aaa    BMI 31 0-31 9,adult            BMI Counseling: Body mass index is 31 82 kg/m²  The BMI is above normal  Nutrition recommendations include encouraging healthy choices of fruits and vegetables and increasing intake of lean protein  Exercise recommendations include strength training exercises  Rationale for BMI follow-up plan is due to patient being overweight or obese  Depression Screening and Follow-up Plan: Patient was screened for depression during today's encounter  They screened negative with a PHQ-2 score of 0  Preventive health issues were discussed with patient, and age appropriate screening tests were ordered as noted in patient's After Visit Summary  Personalized health advice and appropriate referrals for health education or preventive services given if needed, as noted in patient's After Visit Summary  Follow-up lab work per orders  Return in 6 months and sooner as needed  History of Present Illness:     Patient presents for a Medicare Wellness Visit  Patient does mention today chronic fatigue    He also mentions right shoulder discomfort  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 which may be a contributing factor      HPI   Patient Care Team:  Milton Burch DO as PCP - General (Family Medicine)     Review of Systems:     Review of Systems     As noted in HPI      Problem List:     Patient Active Problem List   Diagnosis   • Sick sinus syndrome [facility])   • Left inguinal hernia   • Need for Tdap vaccination   • Sensorineural hearing loss (SNHL) of both ears   • Cardiomyopathy, unspecified type ([address] )   • Breast pain   • Increased risk of breast cancer   • Osteoporosis without current pathological fracture   • Gastroesophageal reflux disease   • Family history of colon cancer   • Medicare annual wellness visit, subsequent      Past Medical and Surgical History:     Past Medical History:   Diagnosis Date   • Bradycardia     permanent pacemaker   • Colon polyp    • GERD (gastroesophageal reflux disease)    • Hearing aid worn    • High cholesterol    • Hypertension      Past Surgical History:   Procedure Laterality Date   • CARDIAC PACEMAKER PLACEMENT  2017    sees cardiologist in [place]- last visit was 5/2020 and has a monitor to check pacemaker   • [address]     • COLONOSCOPY     • CYSTOSCOPY  2018   • HERNIA REPAIR     • [facility] RECRT INGUINAL HERNIA ANY AGE REDUCIBLE Left 12/11/2020    Procedure: REPAIR HERNIA INGUINAL;  Surgeon: Trisha Dunham MD;  [facility]: [facility];  Service: General      Family History:     Family History   Problem Relation Age of Onset   • Cancer Mother    • Colon cancer Mother    • Breast cancer Sister 80      Social History:     Social History     Socioeconomic History   • Marital status:      Spouse name: None   • Number of children: None   • Years of education: None   • Highest education level: None   Occupational History   • None   Tobacco Use   • Smoking status: Never   • Smokeless tobacco: Never   Vaping Use   • Vaping Use: Never used   Substance and Sexual Activity   • Alcohol use: Yes     Comment: occ   • Drug use: Never   • Sexual activity: Not Currently   Other Topics Concern   • None   Social History Narrative   • None     Social Determinants of Health     Financial Resource Strain: Low Risk    • Difficulty of Paying Living Expenses: Not hard at all   Food Insecurity: Not on file   Transportation Needs: No Transportation Needs   • Lack of Transportation (Medical): No   • Lack of Transportation (Non-Medical): No   Physical Activity: Not on file   Stress: Not on file   Social Connections: Not on file   Intimate Partner Violence: Not on file   Housing Stability: Not on file      Medications and Allergies:     Current Outpatient Medications   Medication Sig Dispense Refill   • CALCIUM PO Take by mouth     • Eliquis 5 MG TAKE 1 TABLET BY MOUTH TWO TIMES A DAY 90 tablet 3   • Ferrous Sulfate (IRON PO) Take by mouth     • fluticasone (FLONASE) 50 mcg/act nasal spray INSTILL ONE SPRAY INTO EACH NOSTRIL DAILY FOR 14 DAYS 16 g 1   • multivitamin (THERAGRAN) TABS Take 1 tablet by mouth daily     • omeprazole (PriLOSEC) 40 MG capsule Take 1 capsule (40 mg total) by mouth daily before breakfast 90 capsule 0   • simvastatin (ZOCOR) 40 mg tablet Take 1 tablet (40 mg total) by mouth daily at bedtime 90 tablet 0   • vitamin B-12 (CYANOCOBALAMIN) 100 MCG TABS Take 50 mcg by mouth daily     • Glutamine 500 MG CAPS Take by mouth every morning  (Patient not taking: Reported on 12/13/2022)       No current facility-administered medications for this visit       Allergies   Allergen Reactions   • Other Other (See Comments)     WALNUT      Immunizations:     Immunization History   Administered Date(s) Administered   • COVID-19 MODERNA VACC 0 5 ML IM 02/04/2021, 03/04/2021, 12/23/2021   • Influenza, high dose seasonal 0 7 mL 11/02/2021   • Influenza, seasonal, injectable 01/14/2011, 12/09/2011, 10/11/2017   • Pneumococcal Conjugate Vaccine 20-valent (Pcv20), Polysace 01/16/2023   • Pneumococcal Polysaccharide PPV23 08/05/2021   • Td (adult), adsorbed 04/30/2010      Health Maintenance:         Topic Date Due   • Colorectal Cancer Screening  04/13/2025   • Hepatitis C Screening  Completed         Topic Date Due   • COVID-19 Vaccine (4 - Booster for Moderna series) 02/17/2022   • Influenza Vaccine (1) 09/01/2022      Medicare Screening Tests and Risk Assessments:     Prasanth Jacobsen is here for his Subsequent Wellness visit  Health Risk Assessment:   Patient rates overall health as good  Patient feels that their physical health rating is slightly worse  Patient is satisfied with their life  Eyesight was rated as same  Hearing was rated as same  Patient feels that their emotional and mental health rating is same  Patients states they are never, rarely angry  Patient states they are sometimes unusually tired/fatigued  Pain experienced in the last 7 days has been some  Patient's pain rating has been 8/10  Patient states that he has experienced weight loss or gain in last 6 months  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     Depression Screening:   PHQ-2 Score: 0      Fall Risk Screening: In the past year, patient has experienced: no history of falling in past year      Home Safety:  Patient does not have trouble with stairs inside or outside of their home  Patient has working smoke alarms and has working carbon monoxide detector  Home safety hazards include: none  Nutrition:   Current diet is Regular  Medications:   Patient is currently taking over-the-counter supplements  OTC medications include: see medication list  Patient is able to manage medications  Activities of Daily Living (ADLs)/Instrumental Activities of Daily Living (IADLs):   Walk and transfer into and out of bed and chair?: Yes  Dress and groom yourself?: Yes    Bathe or shower yourself?: Yes    Feed yourself?  Yes  Do your laundry/housekeeping?: Yes  Manage your money, pay your bills and track your expenses?: Yes  Make your own meals?: Yes    Do your own shopping?: Yes    Previous Hospitalizations:   Any hospitalizations or ED visits within the last 12 months?: No      Advance Care Planning:   Living will: No    Durable POA for healthcare: No    Advanced directive: No      Cognitive Screening:   Provider or family/friend/caregiver concerned regarding cognition?: No    PREVENTIVE SCREENINGS      Cardiovascular Screening:    General: History Lipid Disorder and Risks and Benefits Discussed      Diabetes Screening:     General: Risks and Benefits Discussed    Due for: Blood Glucose      Colorectal Cancer Screening:     General: Screening Current      Prostate Cancer Screening:    General: Screening Not Indicated      Osteoporosis Screening:    General: Screening Not Indicated and History Osteoporosis      Abdominal Aortic Aneurysm (AAA) Screening:    Risk factors include: age between 73-69 yo        General: Risks and Benefits Discussed      Lung Cancer Screening:     General: Screening Not Indicated      Hepatitis C Screening:    General: Screening Current    Screening, Brief Intervention, and Referral to Treatment (SBIRT)    Screening    Typical number of drinks in a week: 3    Single Item Drug Screening:  How often have you used an illegal drug (including marijuana) or a prescription medication for non-medical reasons in the past year? never    Single Item Drug Screen Score: 0  Interpretation: Negative screen for possible drug use disorder    Brief Intervention  Healthy alcohol use/limits discussed  Other Counseling Topics:   Car/seat belt/driving safety, skin self-exam and sunscreen  Has upcoming appointment with dermatology for skin check  Physical Exam:     /88 (BP Location: Left arm, Patient Position: Sitting, Cuff Size: Large)   Pulse 74   Ht 6' 1" (1 854 m)   Wt 109 kg (241 lb 3 2 oz)   SpO2 97%   BMI 31 82 kg/m²     Physical Exam  Vitals reviewed     Constitutional:       General: He is not in acute distress  Appearance: Normal appearance  HENT:      Head: Normocephalic and atraumatic  Pulmonary:      Effort: Pulmonary effort is normal    Musculoskeletal:      Right shoulder: No deformity  Normal range of motion  Normal strength  Left shoulder: No deformity  Normal range of motion  Normal strength  Right lower leg: No edema  Left lower leg: No edema  Comments: Negative drop arm test, negative empty can test, negative Hawkin's    Skin:     General: Skin is warm and dry  Comments: Lentigines    Neurological:      Mental Status: He is alert and oriented to person, place, and time  Psychiatric:         Mood and Affect: Mood normal          Behavior: Behavior normal          Thought Content:  Thought content normal          Judgment: Judgment normal           Kelsey Stephens DO

## 2023-01-16 NOTE — PATIENT INSTRUCTIONS
Medicare Preventive Visit Patient Instructions  Thank you for completing your Welcome to Medicare Visit or Medicare Annual Wellness Visit today  Your next wellness visit will be due in one year (1/17/2024)  The screening/preventive services that you may require over the next 5-10 years are detailed below  Some tests may not apply to you based off risk factors and/or age  Screening tests ordered at today's visit but not completed yet may show as past due  Also, please note that scanned in results may not display below  Preventive Screenings:  Service Recommendations Previous Testing/Comments   Colorectal Cancer Screening  · Colonoscopy    · Fecal Occult Blood Test (FOBT)/Fecal Immunochemical Test (FIT)  · Fecal DNA/Cologuard Test  · Flexible Sigmoidoscopy Age: 39-70 years old   Colonoscopy: every 10 years (May be performed more frequently if at higher risk)  OR  FOBT/FIT: every 1 year  OR  Cologuard: every 3 years  OR  Sigmoidoscopy: every 5 years  Screening may be recommended earlier than age 39 if at higher risk for colorectal cancer  Also, an individualized decision between you and your healthcare provider will decide whether screening between the ages of 74-80 would be appropriate   Colonoscopy: 04/14/2022  FOBT/FIT: Not on file  Cologuard: Not on file  Sigmoidoscopy: Not on file    Screening Current     Prostate Cancer Screening Individualized decision between patient and health care provider in men between ages of 53-78   Medicare will cover every 12 months beginning on the day after your 50th birthday PSA: No results in last 5 years     Screening Not Indicated     Hepatitis C Screening Once for adults born between 1945 and 1965  More frequently in patients at high risk for Hepatitis C Hep C Antibody: 11/01/2021    Screening Current   Diabetes Screening 1-2 times per year if you're at risk for diabetes or have pre-diabetes Fasting glucose: 99 mg/dL (11/1/2021)  A1C: No results in last 5 years (No results in last 5 years)      Cholesterol Screening Once every 5 years if you don't have a lipid disorder  May order more often based on risk factors  Lipid panel: 11/01/2021  Screening Not Indicated  History Lipid Disorder      Other Preventive Screenings Covered by Medicare:  1  Abdominal Aortic Aneurysm (AAA) Screening: covered once if your at risk  You're considered to be at risk if you have a family history of AAA or a male between the age of 73-68 who smoking at least 100 cigarettes in your lifetime  2  Lung Cancer Screening: covers low dose CT scan once per year if you meet all of the following conditions: (1) Age 50-69; (2) No signs or symptoms of lung cancer; (3) Current smoker or have quit smoking within the last 15 years; (4) You have a tobacco smoking history of at least 20 pack years (packs per day x number of years you smoked); (5) You get a written order from a healthcare provider  3  Glaucoma Screening: covered annually if you're considered high risk: (1) You have diabetes OR (2) Family history of glaucoma OR (3)  aged 48 and older OR (3)  American aged 72 and older  3  Osteoporosis Screening: covered every 2 years if you meet one of the following conditions: (1) Have a vertebral abnormality; (2) On glucocorticoid therapy for more than 3 months; (3) Have primary hyperparathyroidism; (4) On osteoporosis medications and need to assess response to drug therapy  5  HIV Screening: covered annually if you're between the age of 12-76  Also covered annually if you are younger than 13 and older than 72 with risk factors for HIV infection  For pregnant patients, it is covered up to 3 times per pregnancy      Immunizations:  Immunization Recommendations   Influenza Vaccine Annual influenza vaccination during flu season is recommended for all persons aged >= 6 months who do not have contraindications   Pneumococcal Vaccine   * Pneumococcal conjugate vaccine = PCV13 (Prevnar 13), PCV15 (Vaxneuvance), PCV20 (Prevnar 20)  * Pneumococcal polysaccharide vaccine = PPSV23 (Pneumovax) Adults 2364 years old: 1-3 doses may be recommended based on certain risk factors  Adults 72 years old: 1-2 doses may be recommended based off what pneumonia vaccine you previously received   Hepatitis B Vaccine 3 dose series if at intermediate or high risk (ex: diabetes, end stage renal disease, liver disease)   Tetanus (Td) Vaccine - COST NOT COVERED BY MEDICARE PART B Following completion of primary series, a booster dose should be given every 10 years to maintain immunity against tetanus  Td may also be given as tetanus wound prophylaxis  Tdap Vaccine - COST NOT COVERED BY MEDICARE PART B Recommended at least once for all adults  For pregnant patients, recommended with each pregnancy  Shingles Vaccine (Shingrix) - COST NOT COVERED BY MEDICARE PART B  2 shot series recommended in those aged 48 and above     Health Maintenance Due:      Topic Date Due   • Colorectal Cancer Screening  04/13/2025   • Hepatitis C Screening  Completed     Immunizations Due:      Topic Date Due   • COVID-19 Vaccine (4 - Booster for Moderna series) 02/17/2022   • Influenza Vaccine (1) 09/01/2022     Advance Directives   What are advance directives? Advance directives are legal documents that state your wishes and plans for medical care  These plans are made ahead of time in case you lose your ability to make decisions for yourself  Advance directives can apply to any medical decision, such as the treatments you want, and if you want to donate organs  What are the types of advance directives? There are many types of advance directives, and each state has rules about how to use them  You may choose a combination of any of the following:  · Living will: This is a written record of the treatment you want  You can also choose which treatments you do not want, which to limit, and which to stop at a certain time   This includes surgery, medicine, IV fluid, and tube feedings  · Durable power of  for healthcare [facility]): This is a written record that states who you want to make healthcare choices for you when you are unable to make them for yourself  This person, called a proxy, is usually a family member or a friend  You may choose more than 1 proxy  · Do not resuscitate (DNR) order:  A DNR order is used in case your heart stops beating or you stop breathing  It is a request not to have certain forms of treatment, such as CPR  A DNR order may be included in other types of advance directives  · Medical directive: This covers the care that you want if you are in a coma, near death, or unable to make decisions for yourself  You can list the treatments you want for each condition  Treatment may include pain medicine, surgery, blood transfusions, dialysis, IV or tube feedings, and a ventilator (breathing machine)  · Values history: This document has questions about your views, beliefs, and how you feel and think about life  This information can help others choose the care that you would choose  Why are advance directives important? An advance directive helps you control your care  Although spoken wishes may be used, it is better to have your wishes written down  Spoken wishes can be misunderstood, or not followed  Treatments may be given even if you do not want them  An advance directive may make it easier for your family to make difficult choices about your care  Weight Management   Why it is important to manage your weight:  Being overweight increases your risk of health conditions such as heart disease, high blood pressure, type 2 diabetes, and certain types of cancer  It can also increase your risk for osteoarthritis, sleep apnea, and other respiratory problems  Aim for a slow, steady weight loss  Even a small amount of weight loss can lower your risk of health problems    How to lose weight safely:  A safe and healthy way to lose weight is to eat fewer calories and get regular exercise  You can lose up about 1 pound a week by decreasing the number of calories you eat by 500 calories each day  Healthy meal plan for weight management:  A healthy meal plan includes a variety of foods, contains fewer calories, and helps you stay healthy  A healthy meal plan includes the following:  · Eat whole-grain foods more often  A healthy meal plan should contain fiber  Fiber is the part of grains, fruits, and vegetables that is not broken down by your body  Whole-grain foods are healthy and provide extra fiber in your diet  Some examples of whole-grain foods are whole-wheat breads and pastas, oatmeal, brown rice, and bulgur  · Eat a variety of vegetables every day  Include dark, leafy greens such as spinach, kale, francois greens, and mustard greens  Eat yellow and orange vegetables such as carrots, sweet potatoes, and winter squash  · Eat a variety of fruits every day  Choose fresh or canned fruit (canned in its own juice or light syrup) instead of juice  Fruit juice has very little or no fiber  · Eat low-fat dairy foods  Drink fat-free (skim) milk or 1% milk  Eat fat-free yogurt and low-fat cottage cheese  Try low-fat cheeses such as mozzarella and other reduced-fat cheeses  · Choose meat and other protein foods that are low in fat  Choose beans or other legumes such as split peas or lentils  Choose fish, skinless poultry (chicken or turkey), or lean cuts of red meat (beef or pork)  Before you cook meat or poultry, cut off any visible fat  · Use less fat and oil  Try baking foods instead of frying them  Add less fat, such as margarine, sour cream, regular salad dressing and mayonnaise to foods  Eat fewer high-fat foods  Some examples of high-fat foods include french fries, doughnuts, ice cream, and cakes  · Eat fewer sweets  Limit foods and drinks that are high in sugar   This includes candy, cookies, regular soda, and sweetened drinks  Exercise:  Exercise at least 30 minutes per day on most days of the week  Some examples of exercise include walking, biking, dancing, and swimming  You can also fit in more physical activity by taking the stairs instead of the elevator or parking farther away from stores  Ask your healthcare provider about the best exercise plan for you  © Copyright [employer] 2018 Information is for End User's use only and may not be sold, redistributed or otherwise used for commercial purposes   All illustrations and images included in CareNotes® are the copyrighted property of A D A M , Inc  or [address] [place] [address] [employer]

## 2023-01-16 NOTE — ASSESSMENT & PLAN NOTE
· Discussed COVID-19 bivalent vaccine  · Patient states he believes he received first shingles vaccination in [place], advised patient if this was Shingrix vaccination series he should complete second dose

## 2023-01-17 ENCOUNTER — TELEPHONE (OUTPATIENT)
Dept: ADMINISTRATIVE | Facility: OTHER | Age: 78
End: 2023-01-17

## 2023-01-17 NOTE — TELEPHONE ENCOUNTER
----- Message from Victorina Anton sent at 1/16/2023  9:46 AM EST -----  Regarding: Immunizations  01/16/23 9:46 AM    Hello, our patient Kennon Lundborg has had Immunization, flu shot completed/performed  Please assist in updating the patient chart by making an External outreach to [facility] facility located in [facility]   The date of service is fall 2022 or Jan 2023    Thank you,  Victorina Anton [facility]

## 2023-01-17 NOTE — LETTER
Vaccination Request Form: Influenza      Date Requested: 23  Patient: Fidel Matias  Patient : 1945   Referring Provider: Jose Villegas DO       The above patient has informed us that they have had their   most recent Influenza administered at your facility  Please   complete this form and attach all corresponding documentation  Date of Vaccine(s) Given  ______________________________    Lot Number(s) _______________________________________    Manufacture(s) ______________________________________    Dose Amount (s) _____________________________________    Expiration Date(s) ____________________________________    Comments ____Thank you for sending COVID Immunization information  We are asking for the latest Influenza immunization information  ________________  ____________________________________________________________________  ____________________________________________________________________  ____________________________________________________________________    Administering Facility  ________________________________________________    Vaccine Administered By (print name) ___________________________________      Form Completed By (print name) _______________________________________      Signature ___________________________________________________________      These reports are needed for  compliance  Please fax this completed form and a copy of the Vaccine Document(s) to our office located at [place] [address] as soon as possible to Fax [phone] attention Alba Sine: Phone [phone]    We thank you for your assistance in treating our mutual patient

## 2023-01-17 NOTE — TELEPHONE ENCOUNTER
Upon review of the In Basket request and the patient's chart, initial outreach has been made via fax to facility  Please see Contacts section for details       Thank you  Chelsea Corbin

## 2023-01-17 NOTE — LETTER
Vaccination Request Form: Influenza      Date Requested: 23  Patient: Mukesh Rodríguez  Patient : 1945   Referring Provider: Leticia Morocho DO       The above patient has informed us that they have had their   most recent Influenza administered at your facility  Please   complete this form and attach all corresponding documentation  Date of Vaccine(s) Given  ______________________________    Lot Number(s) _______________________________________    Manufacture(s) ______________________________________    Dose Amount (s) _____________________________________    Expiration Date(s) ____________________________________    Comments __________________________________________________________  ____________________________________________________________________  ____________________________________________________________________  ____________________________________________________________________    Administering Facility  ________________________________________________    Vaccine Administered By (print name) ___________________________________      Form Completed By (print name) _______________________________________      Signature ___________________________________________________________      These reports are needed for  compliance  Please fax this completed form and a copy of the Vaccine Document(s) to our office located at [place] [address] as soon as possible to [phone] patsy Alvarez: Phone [phone]    We thank you for your assistance in treating our mutual patient

## 2023-01-24 NOTE — TELEPHONE ENCOUNTER
As a follow-up, a second attempt has been made for outreach via fax to facility  Please see Contacts section for details      Thank you  Andrew Rick

## 2023-01-25 DIAGNOSIS — K21.9 GASTROESOPHAGEAL REFLUX DISEASE, UNSPECIFIED WHETHER ESOPHAGITIS PRESENT: ICD-10-CM

## 2023-01-25 RX ORDER — OMEPRAZOLE 20 MG/1
CAPSULE, DELAYED RELEASE ORAL
Qty: 90 CAPSULE | Refills: 0 | Status: SHIPPED | OUTPATIENT
Start: 2023-01-25

## 2023-01-31 NOTE — TELEPHONE ENCOUNTER
Upon review of the In Basket request we were able to locate, review, and update the patient chart as requested for Immunization(s) Influenza  Any additional questions or concerns should be emailed to the Practice Liaisons via the appropriate education email address, please do not reply via In Basket      Thank you  Andrew Rick

## 2023-01-31 NOTE — TELEPHONE ENCOUNTER
As a final attempt, a third outreach has been made via telephone call to facility  Please see Contacts section for details  This encounter will be closed and completed by end of day  Should we receive the requested information because of previous outreach attempts, the requested patient's chart will be updated appropriately       Thank you  Court Heard

## 2023-02-01 DIAGNOSIS — M25.511 RIGHT SHOULDER PAIN, UNSPECIFIED CHRONICITY: Primary | ICD-10-CM

## 2023-02-06 ENCOUNTER — HOSPITAL ENCOUNTER (OUTPATIENT)
Dept: RADIOLOGY | Facility: MEDICAL CENTER | Age: 78
Discharge: HOME | End: 2023-02-06

## 2023-02-06 DIAGNOSIS — Z13.6 SCREENING FOR AAA (ABDOMINAL AORTIC ANEURYSM): ICD-10-CM

## 2023-02-07 ENCOUNTER — OFFICE VISIT (OUTPATIENT)
Dept: OBGYN CLINIC | Facility: MEDICAL CENTER | Age: 78
End: 2023-02-07

## 2023-02-07 ENCOUNTER — APPOINTMENT (OUTPATIENT)
Dept: RADIOLOGY | Facility: MEDICAL CENTER | Age: 78
End: 2023-02-07

## 2023-02-07 VITALS
BODY MASS INDEX: 31.07 KG/M2 | HEART RATE: 61 BPM | SYSTOLIC BLOOD PRESSURE: 117 MMHG | HEIGHT: 73 IN | WEIGHT: 234.4 LBS | DIASTOLIC BLOOD PRESSURE: 71 MMHG

## 2023-02-07 DIAGNOSIS — M25.511 RIGHT SHOULDER PAIN, UNSPECIFIED CHRONICITY: ICD-10-CM

## 2023-02-07 DIAGNOSIS — M75.51 SUBACROMIAL BURSITIS OF RIGHT SHOULDER JOINT: ICD-10-CM

## 2023-02-07 DIAGNOSIS — M75.81 ROTATOR CUFF TENDONITIS, RIGHT: Primary | ICD-10-CM

## 2023-02-07 NOTE — PROGRESS NOTES
[place] [facility] [place]  [facility] [address]  [id number]  1945    ORTHOPAEDIC SURGERY OUTPATIENT NOTE  2/7/2023      HISTORY:  78 y o  LHD male presents with a chief complaint of right shoulder pain  The pain began 1 year(s) ago and is not associated with an acute injury  The patient describes the pain as aching and dull in intensity,  intermittent in timing, and localizes the pain to the  right lateral shoulder  The pain is worse with overhead work and raising arm over head and relieved by rest   The pain is not associated with numbness and tingling  The pain is not associated with constitutional symptoms  The patient is awoken at night by the pain  Patient reports he fell on  2/03/23 landing on the bilateral knees  Patient reports anterior knee pain worse with kneeling           Past Medical History:   Diagnosis Date   • Bradycardia     permanent pacemaker   • Colon polyp    • GERD (gastroesophageal reflux disease)    • Hearing aid worn    • High cholesterol    • Hypertension        Past Surgical History:   Procedure Laterality Date   • CARDIAC PACEMAKER PLACEMENT  2017    sees cardiologist in [place]- last visit was 5/2020 and has a monitor to check pacemaker   • [address]     • COLONOSCOPY     • CYSTOSCOPY  2018   • HERNIA REPAIR     • TN RPR RECRT INGUINAL HERNIA ANY AGE REDUCIBLE Left 12/11/2020    Procedure: REPAIR HERNIA INGUINAL;  Surgeon: Laquita Vargas MD;  Location: [address] [place] [address];  Service: General       Social History     Socioeconomic History   • Marital status:      Spouse name: Not on file   • Number of children: Not on file   • Years of education: Not on file   • Highest education level: Not on file   Occupational History   • Not on file   Tobacco Use   • Smoking status: Never   • Smokeless tobacco: Never   Vaping Use   • Vaping Use: Never used   Substance and Sexual Activity   • Alcohol use: Yes     Comment: occ   • Drug use: Never   • Sexual activity: Not Currently   Other Topics Concern   • Not on file   Social History Narrative   • Not on file     Social Determinants of Health     Financial Resource Strain: Low Risk    • Difficulty of Paying Living Expenses: Not hard at all   Food Insecurity: Not on file   Transportation Needs: No Transportation Needs   • Lack of Transportation (Medical): No   • Lack of Transportation (Non-Medical): No   Physical Activity: Not on file   Stress: Not on file   Social Connections: Not on file   Intimate Partner Violence: Not on file   Housing Stability: Not on file       Family History   Problem Relation Age of Onset   • Cancer Mother    • Colon cancer Mother    • Breast cancer Sister 80        Patient's Medications   New Prescriptions    No medications on file   Previous Medications    CALCIUM PO    Take by mouth    ELIQUIS 5 MG    TAKE 1 TABLET BY MOUTH TWO TIMES A DAY    FERROUS SULFATE (IRON PO)    Take by mouth    FLUTICASONE (FLONASE) 50 MCG/ACT NASAL SPRAY    INSTILL ONE SPRAY INTO EACH NOSTRIL DAILY FOR 14 DAYS    GLUTAMINE 500 MG CAPS    Take by mouth every morning    MULTIVITAMIN (THERAGRAN) TABS    Take 1 tablet by mouth daily    OMEPRAZOLE (PRILOSEC) 20 MG DELAYED RELEASE CAPSULE    TAKE TWO CAPSULES (40 MG TOTAL) BY MOUTH DAILY    SIMVASTATIN (ZOCOR) 40 MG TABLET    Take 1 tablet (40 mg total) by mouth daily at bedtime    VITAMIN B-12 (CYANOCOBALAMIN) 100 MCG TABS    Take 50 mcg by mouth daily   Modified Medications    No medications on file   Discontinued Medications    No medications on file       Allergies   Allergen Reactions   • Other Other (See Comments)     LASHAUN        /71 (BP Location: Left arm, Patient Position: Sitting, Cuff Size: Standard)   Pulse 61   Ht 6' 1" (1 854 m)   Wt 106 kg (234 lb 6 4 oz)   BMI 30 93 kg/m²      REVIEW OF SYSTEMS:  Constitutional: Negative  HEENT: Negative      Respiratory: Negative  Skin: Negative  Neurological: Negative  Psychiatric/Behavioral: Negative  Musculoskeletal: Negative except for that mentioned in the HPI      PHYSICAL EXAM:     LEFT SHOULDER:     NVI axillary/medial/radial/ulnar and sensory intact     Forward flexion:   180 degrees   Abduction:  180 degrees   External rotation at 90 degrees abduction:   90 degrees   Internal rotation at 90 degrees abduction:  90 degrees   External rotation at 0 degrees:   70 degrees   Internal rotation: T7     STRENGTH:  Forward flexion:  5/5   Abduction:  5/5   External rotation:  5/5   Internal rotation:  5/5        Speed test: Negative  Yergason's: Negative   Tender to palpation ACJ (acromioclavicular joint): Negative   Tender to palpation LHB (long head of biceps): Negative  Treadwell test: Negative  Black Hawk test: Negative  Hornblower's: Negative  Lift off: Negative  Belly press: Negative  Bear hug: Negative  External lag sign: Negative  Cross-body adduction: Negative  Sulcus sign: Negative  Dayanna's test: Negative  Drop arm test: negative     RIGHT SHOULDER:     NVI axillary/medial/radial/ulnar and sensory intact     Forward flexion:   180 degrees   Abduction:  180 degrees   External rotation at 90 degrees abduction:  90 degrees   Internal rotation at 90 degrees abduction:   90 degrees   External rotation at 0 degrees:  70 degrees   Internal rotation: T7      STRENGTH:  Forward flexion:  5/5   Abduction:  5/5   External rotation:  5/5   Internal rotation:  5/5     Speed test: Negative  Yergason's: Negative   Tender to palpation ACJ: Negative   Tender to palpation LHB: Negative  Treadwell test: Positive  Black Hawk's: Positive  Hornblower's: Negative  Lift off: Negative  Belly press: Negative  Bear hug: Negative  External lag sign: Negative  Cross-body adduction: Negative  Sulcus sign: Negative  Dayanna's test: Negative  Drop arm test: Negative     Reflexes:  Brachioradialis:  Symmetric bilaterally  Triceps: Symmetric bilaterally  Biceps: Symmetric bilaterally  Patella tendon: Symmetric bilaterally  Achilles tendon: Symmetric bilaterally  Babinski's: Negative  Adilia sign: Negative     No scapular winging or dyskinesis     Capillary refill brisk   Full ROM of elbows bilaterally      Skin:  No ecchymosis/abrasion/erythema/warmth     Cervical spine:   Full rotation, side bending, flexion and extension without radiating pain  Spurling's: Negative    IMAGING:  Right shoulder x-rays demonstrates no fracture or dislocation, cyst changes humeral head, AC degenerative changes    ASSESSMENT AND PLAN: 78 y o  male The patient has an examination consistent with subacromial bursitis and RTC tendonitis  I have discussed with the patient the pathophysiology of this diagnosis and reviewed how the examination correlates with this diagnosis  Treatment options were discussed at length and after discussing these treatment options, the patient was provided with a home exercise program and theraband  If this fails to provided relief we can consider CS injection  Patient was instructed to give the knees more time  If symptoms persist we can consider referral to one of my colleagues           Scribe Attestation    I,:  Pilar Zhang am acting as a scribe while in the presence of the attending physician :       I,:  Mellisa Loyd personally performed the services described in this documentation    as scribed in my presence :

## 2023-02-28 ENCOUNTER — CONSULT (OUTPATIENT)
Dept: DERMATOLOGY | Facility: CLINIC | Age: 78
End: 2023-02-28

## 2023-02-28 ENCOUNTER — APPOINTMENT (OUTPATIENT)
Dept: RADIOLOGY | Facility: MEDICAL CENTER | Age: 78
End: 2023-02-28

## 2023-02-28 ENCOUNTER — CLINICAL SUPPORT (OUTPATIENT)
Dept: URGENT CARE | Facility: MEDICAL CENTER | Age: 78
End: 2023-02-28

## 2023-02-28 ENCOUNTER — OFFICE VISIT (OUTPATIENT)
Dept: FAMILY MEDICINE CLINIC | Facility: MEDICAL CENTER | Age: 78
End: 2023-02-28

## 2023-02-28 ENCOUNTER — TELEPHONE (OUTPATIENT)
Dept: FAMILY MEDICINE CLINIC | Facility: MEDICAL CENTER | Age: 78
End: 2023-02-28

## 2023-02-28 VITALS
HEART RATE: 68 BPM | BODY MASS INDEX: 31.14 KG/M2 | OXYGEN SATURATION: 98 % | DIASTOLIC BLOOD PRESSURE: 82 MMHG | WEIGHT: 235 LBS | SYSTOLIC BLOOD PRESSURE: 118 MMHG | HEIGHT: 73 IN

## 2023-02-28 VITALS — HEIGHT: 72 IN | WEIGHT: 235 LBS | TEMPERATURE: 97.4 F | BODY MASS INDEX: 31.83 KG/M2

## 2023-02-28 DIAGNOSIS — R06.09 DOE (DYSPNEA ON EXERTION): Primary | ICD-10-CM

## 2023-02-28 DIAGNOSIS — E78.5 HYPERLIPIDEMIA, UNSPECIFIED HYPERLIPIDEMIA TYPE: ICD-10-CM

## 2023-02-28 DIAGNOSIS — D22.60 MULTIPLE BENIGN MELANOCYTIC NEVI OF UPPER EXTREMITY, LOWER EXTREMITY, AND TRUNK: ICD-10-CM

## 2023-02-28 DIAGNOSIS — D48.5 NEOPLASM OF UNCERTAIN BEHAVIOR OF SKIN: ICD-10-CM

## 2023-02-28 DIAGNOSIS — D18.01 CHERRY ANGIOMA: ICD-10-CM

## 2023-02-28 DIAGNOSIS — R06.09 DOE (DYSPNEA ON EXERTION): ICD-10-CM

## 2023-02-28 DIAGNOSIS — K21.9 GASTROESOPHAGEAL REFLUX DISEASE, UNSPECIFIED WHETHER ESOPHAGITIS PRESENT: ICD-10-CM

## 2023-02-28 DIAGNOSIS — L85.3 XEROSIS OF SKIN: ICD-10-CM

## 2023-02-28 DIAGNOSIS — L81.4 SOLAR LENTIGO: ICD-10-CM

## 2023-02-28 DIAGNOSIS — D22.70 MULTIPLE BENIGN MELANOCYTIC NEVI OF UPPER EXTREMITY, LOWER EXTREMITY, AND TRUNK: ICD-10-CM

## 2023-02-28 DIAGNOSIS — L82.1 SEBORRHEIC KERATOSES: Primary | ICD-10-CM

## 2023-02-28 DIAGNOSIS — D22.5 MULTIPLE BENIGN MELANOCYTIC NEVI OF UPPER EXTREMITY, LOWER EXTREMITY, AND TRUNK: ICD-10-CM

## 2023-02-28 RX ORDER — OMEPRAZOLE 20 MG/1
20 CAPSULE, DELAYED RELEASE ORAL DAILY
Qty: 90 CAPSULE | Refills: 0 | Status: SHIPPED | OUTPATIENT
Start: 2023-02-28

## 2023-02-28 RX ORDER — SIMVASTATIN 40 MG
40 TABLET ORAL
Qty: 90 TABLET | Refills: 1 | Status: SHIPPED | OUTPATIENT
Start: 2023-02-28

## 2023-02-28 NOTE — PROGRESS NOTES
[address] [facility] - Clinic Note  Katey Hernadez, [place], 23     Macy Estrada MRN: [id number] : 1945 Age: 70 y o  Assessment/Plan     1  LANGSTON (dyspnea on exertion)    -Patient did have stress test in 2021:  SUMMARY:  -  Rest ECG: AV sequential pacing  -  Stress results: There was no chest pain during stress  -  ECG conclusions: The stress ECG was non-diagnostic due to demand paced rhythm and baseline ST/T wave changes  -  Perfusion imaging: There was a small to moderate, moderately severe, partially reversible myocardial perfusion defect of apical , inferolateral and basal inferoseptal wall  -  Gated SPECT: The calculated left ventricular ejection fraction was 45 %  Left ventricular ejection fraction was mildly decreased by visual estimate  There was mildly reduced myocardial thickening and motion of apical and inferoseptal  wall     IMPRESSIONS: Abnormal study after pharmacologic vasodilation without reproduction of symptoms  Stress ekg non diagnostic due to baseline ST/T wave changes and demand ventricular pacing There was a small to moderate, moderately severe, partially reversible myocardial perfusion defect of apical , inferolateral and basal inferoseptal  wall suggestive of ischemia  LV systolic function is mildly reduced with regional wall motion abnormalities in apical and inferoseptal wall  -Patient does have follow-up with Cardiology this month  -Follow-up testing per orders  - XR chest pa & lateral; Future  - Complete PFT with post bronchodilator; Future  - ECG 12 lead; Future  -Patient advised about red flag signs and symptoms in which case he should seek prompt medical attention    Macy Estrada acknowledged understanding of treatment plan, all questions answered  Subjective     Macy Estrada is a 70 y o  male who presents for evaluation of of dyspnea "not every day, I work too hard and too much"  Dyspnea has been mild, and generally lasting "about an hour"   Episodes usually occur intermittently and have been unchanged, "if I am up a couple of hours"  Denies fever, leg pain, leg swelling, light-headed/dizzy and palpitations  The symptoms are aggravated by exertion, and relieved by rest   Quit smoking 1973  Patient expresses concern about work exposures he may have had in the past that could be contributing to this complaint  Patient recalls 2 notable episodes during which he also felt some chest tightness  Patient states he eats lunch with his son and his son has noticed his labored breathing at times  Today, no chest pain      The following portions of the patient's history were reviewed and updated as appropriate: allergies, current medications, past family history, past medical history, past social history, past surgical history and problem list      Past Medical History:   Diagnosis Date   • Bradycardia     permanent pacemaker   • Colon polyp    • GERD (gastroesophageal reflux disease)    • Hearing aid worn    • High cholesterol    • Hypertension        Allergies   Allergen Reactions   • Other Other (See Comments)     WALNUT       Past Surgical History:   Procedure Laterality Date   • CARDIAC PACEMAKER PLACEMENT  2017    sees cardiologist in [place]- last visit was 5/2020 and has a monitor to check pacemaker   • [address]     • COLONOSCOPY     • CYSTOSCOPY  2018   • HERNIA REPAIR     • MS RPR RECRT INGUINAL HERNIA ANY AGE REDUCIBLE Left 12/11/2020    Procedure: REPAIR HERNIA INGUINAL;  Surgeon: Mckay Fonseca MD;  Location: [address] [place] [address];  Service: General       Family History   Problem Relation Age of Onset   • Cancer Mother    • Colon cancer Mother    • Breast cancer Sister 80       Social History     Socioeconomic History   • Marital status:      Spouse name: None   • Number of children: None   • Years of education: None   • Highest education level: None   Occupational History   • None   Tobacco Use   • Smoking status: Never   • Smokeless tobacco: Never   Vaping Use   • Vaping Use: Never used   Substance and Sexual Activity   • Alcohol use: Yes     Comment: occ   • Drug use: Never   • Sexual activity: Not Currently   Other Topics Concern   • None   Social History Narrative   • None     Social Determinants of Health     Financial Resource Strain: Low Risk    • Difficulty of Paying Living Expenses: Not hard at all   Food Insecurity: Not on file   Transportation Needs: No Transportation Needs   • Lack of Transportation (Medical): No   • Lack of Transportation (Non-Medical): No   Physical Activity: Not on file   Stress: Not on file   Social Connections: Not on file   Intimate Partner Violence: Not on file   Housing Stability: Not on file       Current Outpatient Medications   Medication Sig Dispense Refill   • CALCIUM PO Take by mouth     • Eliquis 5 MG TAKE 1 TABLET BY MOUTH TWO TIMES A DAY 90 tablet 3   • Ferrous Sulfate (IRON PO) Take by mouth     • fluticasone (FLONASE) 50 mcg/act nasal spray INSTILL ONE SPRAY INTO EACH NOSTRIL DAILY FOR 14 DAYS 16 g 1   • Glutamine 500 MG CAPS Take by mouth every morning     • multivitamin (THERAGRAN) TABS Take 1 tablet by mouth daily     • omeprazole (PriLOSEC) 20 mg delayed release capsule TAKE TWO CAPSULES (40 MG TOTAL) BY MOUTH DAILY 90 capsule 0   • simvastatin (ZOCOR) 40 mg tablet Take 1 tablet (40 mg total) by mouth daily at bedtime 90 tablet 0   • vitamin B-12 (CYANOCOBALAMIN) 100 MCG TABS Take 50 mcg by mouth daily       No current facility-administered medications for this visit  Review of Systems     As noted in HPI    Objective      /82 (BP Location: Left arm, Patient Position: Sitting, Cuff Size: Large)   Pulse 68   Ht 6' 1" (1 854 m)   Wt 107 kg (235 lb)   SpO2 98%   BMI 31 00 kg/m²     Physical Exam  Vitals reviewed  Constitutional:       General: He is not in acute distress  Appearance: Normal appearance  He is not ill-appearing, toxic-appearing or diaphoretic     HENT: Head: Normocephalic and atraumatic  Eyes:      Conjunctiva/sclera: Conjunctivae normal    Cardiovascular:      Rate and Rhythm: Normal rate and regular rhythm  Pulses: Normal pulses  Heart sounds: Normal heart sounds  Comments: Negative Brandon sign bilaterally    No erythema or swelling bilateral lower extremities, no palpable cords  Pulmonary:      Effort: Pulmonary effort is normal  No respiratory distress  Breath sounds: Normal breath sounds  No stridor  No wheezing, rhonchi or rales  Musculoskeletal:      Right lower leg: No edema  Left lower leg: No edema  Skin:     General: Skin is warm and dry  Neurological:      Mental Status: He is alert and oriented to person, place, and time  Psychiatric:         Mood and Affect: Mood normal          Behavior: Behavior normal          Thought Content: Thought content normal          Judgment: Judgment normal              Some portions of this record may have been generated with voice recognition software  There may be translation, syntax, or grammatical errors  Occasional wrong word or "sound-a-like" substitutions may have occurred due to the inherent limitations of the voice recognition software  Read the chart carefully and recognize, using context, where substations may have occurred  If you have any questions, please contact the dictating provider for clarification or correction, as needed

## 2023-02-28 NOTE — PATIENT INSTRUCTIONS
MELANOCYTIC NEVI ("Moles")    Assessment and Plan:  Based on a thorough discussion of this condition and the management approach to it (including a comprehensive discussion of the known risks, side effects and potential benefits of treatment), the patient (family) agrees to implement the following specific plan:  When outside we recommend using a wide brim hat, sunglasses, long sleeve and pants, sunscreen with SPF 02+ with reapplication every 2 hours, or SPF specific clothing   Benign, reassured  Annual skin check     Melanocytic Nevi  Melanocytic nevi ("moles") are tan or brown, raised or flat areas of the skin which have an increased number of melanocytes  Melanocytes are the cells in our body which make pigment and account for skin color  Some moles are present at birth (I e , "congenital nevi"), while others come up later in life (i e , "acquired nevi")  The sun can stimulate the body to make more moles  Sunburns are not the only thing that triggers more moles  Chronic sun exposure can do it too  Clinically distinguishing a healthy mole from melanoma may be difficult, even for experienced dermatologists  The "ABCDE's" of moles have been suggested as a means of helping to alert a person to a suspicious mole and the possible increased risk of melanoma  The suggestions for raising alert are as follows:    Asymmetry: Healthy moles tend to be symmetric, while melanomas are often asymmetric  Asymmetry means if you draw a line through the mole, the two halves do not match in color, size, shape, or surface texture  Asymmetry can be a result of rapid enlargement of a mole, the development of a raised area on a previously flat lesion, scaling, ulceration, bleeding or scabbing within the mole  Any mole that starts to demonstrate "asymmetry" should be examined promptly by a board certified dermatologist      Border: Healthy moles tend to have discrete, even borders    The border of a melanoma often blends into the normal skin and does not sharply delineate the mole from normal skin  Any mole that starts to demonstrate "uneven borders" should be examined promptly by a board certified dermatologist      Color: Healthy moles tend to be one color throughout  Melanomas tend to be made up of different colors ranging from dark black, blue, white, or red  Any mole that demonstrates a color change should be examined promptly by a board certified dermatologist      Diameter: Healthy moles tend to be smaller than 0 6 cm in size; an exception are "congenital nevi" that can be larger  Melanomas tend to grow and can often be greater than 0 6 cm (1/4 of an inch, or the size of a pencil eraser)  This is only a guideline, and many normal moles may be larger than 0 6 cm without being unhealthy  Any mole that starts to change in size (small to bigger or bigger to smaller) should be examined promptly by a board certified dermatologist      Evolving: Healthy moles tend to "stay the same "  Melanomas may often show signs of change or evolution such as a change in size, shape, color, or elevation  Any mole that starts to itch, bleed, crust, burn, hurt, or ulcerate or demonstrate a change or evolution should be examined promptly by a board certified dermatologist         Lu Foster and Plan:  Based on a thorough discussion of this condition and the management approach to it (including a comprehensive discussion of the known risks, side effects and potential benefits of treatment), the patient (family) agrees to implement the following specific plan:  When outside we recommend using a wide brim hat, sunglasses, long sleeve and pants, sunscreen with SPF 00+ with reapplication every 2 hours, or SPF specific clothing       What is a lentigo? A lentigo is a pigmented flat or slightly raised lesion with a clearly defined edge  Unlike an ephelis (freckle), it does not fade in the winter months  There are several kinds of lentigo    The name lentigo originally referred to its appearance resembling a small lentil  The plural of lentigo is lentigines, although “lentigos” is also in common use  Who gets lentigines? Lentigines can affect males and females of all ages and races  Solar lentigines are especially prevalent in fair skinned adults  Lentigines associated with syndromes are present at birth or arise during childhood  What causes lentigines? Common forms of lentigo are due to exposure to ultraviolet radiation:  Sun damage including sunburn   Indoor tanning   Phototherapy, especially photochemotherapy (PUVA)    Ionizing radiation, eg radiation therapy, can also cause lentigines  Several familial syndromes associated with widespread lentigines originate from mutations in [place]-MAP kinase, mTOR signaling and PTEN pathways  What is the treatment for lentigines? Most lentigines are left alone  Attempts to lighten them may not be successful  The following approaches are used:  SPF 50+ broad-spectrum sunscreen   Hydroquinone bleaching cream   Alpha hydroxy acids   Vitamin C   Retinoids   Azelaic acid   Chemical peels  Individual lesions can be permanently removed using:  Cryotherapy   Intense pulsed light   Pigment lasers    How can lentigines be prevented? Lentigines associated with exposure ultraviolet radiation can be prevented by very careful sun protection  Clothing is more successful at preventing new lentigines than are sunscreens  What is the outlook for lentigines? Lentigines usually persist  They may increase in number with age and sun exposure  Some in sun-protected sites may fade and disappear      MUSTAFA ANGIOMAS    Assessment and Plan:  Based on a thorough discussion of this condition and the management approach to it (including a comprehensive discussion of the known risks, side effects and potential benefits of treatment), the patient (family) agrees to implement the following specific plan:  Monitor for changes  Benign, reassured      Assessment and Plan:    Cherry angioma, also known as [employer] spots, are benign vascular skin lesions  A "cherry angioma" is a firm red, blue or purple papule, 0 1-1 cm in diameter  When thrombosed, they can appear black in colour until evaluated with a dermatoscope when the red or purple colour is more easily seen  Cherry angioma may develop on any part of the body but most often appear on the scalp, face, lips and trunk  An angioma is due to proliferating endothelial cells; these are the cells that line the inside of a blood vessel  Angiomas can arise in early life or later in life; the most common type of angioma is a cherry angioma  Cherry angiomas are very common in males and females of any age or race  They are more noticeable in white skin than in skin of colour  They markedly increase in number from about the age of 36  There may be a family history of similar lesions  Eruptive cherry angiomas have been rarely reported to be associated with internal malignancy  The cause of angiomas is unknown  Genetic analysis of cherry angiomas has shown that they frequently carry specific somatic missense mutations in the GNAQ and GNA11 (Q209H) genes, which are involved in other vascular and melanocytic proliferations  SEBORRHEIC KERATOSIS; NON-INFLAMED    Assessment and Plan:  Based on a thorough discussion of this condition and the management approach to it (including a comprehensive discussion of the known risks, side effects and potential benefits of treatment), the patient (family) agrees to implement the following specific plan:  Monitor for changes  Benign, reassured      Seborrheic Keratosis  A seborrheic keratosis is a harmless warty spot that appears during adult life as a common sign of skin aging  Seborrheic keratoses can arise on any area of skin, covered or uncovered, with the usual exception of the palms and soles  They do not arise from mucous membranes   Seborrheic keratoses can have highly variable appearance  Seborrheic keratoses are extremely common  It has been estimated that over 90% of adults over the age of 61 years have one or more of them  They occur in males and females of all races, typically beginning to erupt in the 35s or 45s  They are uncommon under the age of 21 years  The precise cause of seborrhoeic keratoses is not known  Seborrhoeic keratoses are considered degenerative in nature  As time goes by, seborrheic keratoses tend to become more numerous  Some people inherit a tendency to develop a very large number of them; some people may have hundreds of them  There is no easy way to remove multiple lesions on a single occasion  Unless a specific lesion is "inflamed" and is causing pain or stinging/burning or is bleeding, most insurance companies do not authorize treatment  XEROSIS ("DRY SKIN")    Assessment and Plan:  Based on a thorough discussion of this condition and the management approach to it (including a comprehensive discussion of the known risks, side effects and potential benefits of treatment), the patient (family) agrees to implement the following specific plan:  Use moisturizer like Eucerin,Cerave or Aveeno Cream 3 times a day for the dry skin            Dry skin refers to skin that feels dry to touch  Dry skin has a dull surface with a rough, scaly quality  The skin is less pliable and cracked  When dryness is severe, the skin may become inflamed and fissured  Although any body site can be dry, dry skin tends to affect the shins more than any other site  Dry skin is lacking moisture in the outer horny cell layer (stratum corneum) and this results in cracks in the skin surface  Dry skin is also called xerosis, xeroderma or asteatosis (lack of fat)  It can affect males and females of all ages  There is some racial variability in water and lipid content of the skin    Dry skin that starts in early childhood may be one of about 20 types of ichthyosis (fish-scale skin)  There is often a family history of dry skin  Dry skin is commonly seen in people with atopic dermatitis  Nearly everyone > 60 years has dry skin  Dry skin that begins later may be seen in people with certain diseases and conditions  Postmenopausal women  Hypothyroidism  Chronic renal disease   Malnutrition and weight loss   Subclinical dermatitis   Treatment with certain drugs such as oral retinoids, diuretics and epidermal growth factor receptor inhibitors      What is the treatment for dry skin? The mainstay of treatment of dry skin and ichthyosis is moisturisers/emollients  They should be applied liberally and often enough to:  Reduce itch   Improve the barrier function   Prevent entry of irritants, bacteria   Reduce transepidermal water loss  How can dry skin be prevented? Eliminate aggravating factors:  Reduce the frequency of bathing  A humidifier in winter and air conditioner in summer   Compare having a short shower with a prolonged soak in a bath  Use lukewarm, not hot, water  Replace standard soap with a substitute such as a synthetic detergent cleanser, water-miscible emollient, bath oil, anti-pruritic tar oil, colloidal oatmeal etc    Apply an emollient liberally and often, particularly shortly after bathing, and when itchy  The drier the skin, the thicker this should be, especially on the hands  What is the outlook for dry skin? A tendency to dry skin may persist life-long, or it may improve once contributing factors are controlled  NEOPLASM OF UNCERTAIN BEHAVIOR OF SKIN    Assessment and Plan:  I have discussed with the patient that a sample of skin via a "skin biopsy” would be potentially helpful to further make a specific diagnosis under the microscope    Based on a thorough discussion of this condition and the management approach to it (including a comprehensive discussion of the known risks, side effects and potential benefits of treatment), the patient (family) agrees to implement the following specific plan:    Procedure:  Skin Biopsy  After a thorough discussion of treatment options and risk/benefits/alternatives (including but not limited to local pain, scarring, dyspigmentation, blistering, possible superinfection, and inability to confirm a diagnosis via histopathology), verbal and written consent were obtained and portion of the rash was biopsied for tissue sample  See below for consent that was obtained from patient and subsequent Procedure Note  PROCEDURE TANGENTIAL (SHAVE) BIOPSY NOTE:      After obtaining informed consent  at which time there was a discussion about the purpose of biopsy  and low risks of infection and bleeding  The area was prepped and draped in the usual fashion  Anesthesia was obtained with 1% lidocaine with epinephrine  A shave biopsy to an appropriate sampling depth was obtained by Shave (Dermablade or 15 blade) The resulting wound was covered with surgical ointment and bandaged appropriately  The patient tolerated the procedure well without complications and was without signs of functional compromise  Specimen has been sent for review by Dermatopathology  Standard post-procedure care has been explained and has been included in written form within the patient's copy of Informed Consent  INFORMED CONSENT DISCUSSION AND POST-OPERATIVE INSTRUCTIONS FOR PATIENT    I   RATIONALE FOR PROCEDURE  I understand that a skin biopsy allows the Dermatologist to test a lesion or rash under the microscope to obtain a diagnosis  It usually involves numbing the area with numbing medication and removing a small piece of skin; sometimes the area will be closed with sutures  In this specific procedure, sutures are not usually needed  If any sutures are placed, then they are usually need to be removed in 2 weeks or less      I understand that my Dermatologist recommends that a skin "shave" biopsy be performed today   A local anesthetic, similar to the kind that a dentist uses when filling a cavity, will be injected with a very small needle into the skin area to be sampled  The injected skin and tissue underneath "will go to sleep” and become numb so no pain should be felt afterwards  An instrument shaped like a tiny "razor blade" (shave biopsy instrument) will be used to cut a small piece of tissue and skin from the area so that a sample of tissue can be taken and examined more closely under the microscope  A slight amount of bleeding will occur, but it will be stopped with direct pressure and a pressure bandage and any other appropriate methods  I understands that a scar will form where the wound was created  Surgical ointment will be applied to help protect the wound  Sutures are not usually needed  II   RISKS AND POTENTIAL COMPLICATIONS   I understand the risks and potential complications of a skin biopsy include but are not limited to the following:  Bleeding  Infection  Pain  Scar/keloid  Skin discoloration  Incomplete Removal  Recurrence  Nerve Damage/Numbness/Loss of Function  Allergic Reaction to Anesthesia  Biopsies are diagnostic procedures and based on findings additional treatment or evaluation may be required  Loss or destruction of specimen resulting in no additional findings    My Dermatologist has explained to me the nature of the condition, the nature of the procedure, and the benefits to be reasonably expected compared with alternative approaches  My Dermatologist has discussed the likelihood of major risks or complications of this procedure including the specific risks listed above, such as bleeding, infection, and scarring/keloid  I understand that a scar is expected after this procedure  I understand that my physician cannot predict if the scar will form a "keloid," which extends beyond the borders of the wound that is created  A keloid is a thick, painful, and bumpy scar    A keloid can be difficult to treat, as it does not always respond well to therapy, which includes injecting cortisone directly into the keloid every few weeks  While this usually reduces the pain and size of the scar, it does not eliminate it  I understand that photographs may be taken before and after the procedure  These will be maintained as part of the medical providers confidential records and may not be made available to me  I further authorize the medical provider to use the photographs for teaching purposes or to illustrate scientific papers, books, or lectures if in his/her judgment, medical research, education, or science may benefit from its use  I have had an opportunity to fully inquire about the risks and benefits of this procedure and its alternatives  I have been given ample time and opportunity to ask questions and to seek a second opinion if I wished to do so  I acknowledge that there have specifically been no guarantees as to the cosmetic results from the procedure  I am aware that with any procedure there is always the possibility of an unexpected complication  III  POST-PROCEDURAL CARE (WHAT YOU WILL NEED TO DO "AFTER THE BIOPSY" TO OPTIMIZE HEALING)    Keep the area clean and dry  Try NOT to remove the bandage or get it wet for the first 24 hours  Gently clean the area and apply surgical ointment (such as Vaseline petrolatum ointment, which is available "over the counter" and not a prescription) to the biopsy site for up to 2 weeks straight  This acts to protect the wound from the outside world  Sutures are not usually placed in this procedure  If any sutures were placed, return for suture removal as instructed (generally 1 week for the face, 2 weeks for the body)  Take Acetaminophen (Tylenol) for discomfort, if no contraindications  Ibuprofen or aspirin could make bleeding worse      Call our office immediately for signs of infection: fever, chills, increased redness, warmth, tenderness, discomfort/pain, or pus or foul smell coming from the wound  WHAT TO DO IF THERE IS ANY BLEEDING? If a small amount of bleeding is noticed, place a clean cloth over the area and apply firm pressure for ten minutes  Check the wound after 10 minutes of direct pressure  If bleeding persists, try one more time for an additional 10 minutes of direct pressure on the area  If the bleeding becomes heavier or does not stop after the second attempt, or if you have any other questions about this procedure, then please call your [facility] Dermatologist by calling [phone] (SKIN)  I hereby acknowledge that I have reviewed and verified the site with my Dermatologist and have requested and authorized my Dermatologist to proceed with the procedure

## 2023-02-28 NOTE — PROGRESS NOTES
[facility]     Patient Name: Steffi Dove  Encounter Date: 2/28/23     Have you been cared for by a [place] [id number] Dermatologist in the last 3 years and, if so, which description applies to you? NO  I am considered a "new" patient and must complete all patient intake questions  I am MALE/not capable of bearing children  REVIEW OF SYSTEMS:  Have you recently had or currently have any of the following? · Recent fever or chills? No  · Any non-healing wound? No   PAST MEDICAL HISTORY:  Have you personally ever had or currently have any of the following? If "YES," then please provide more detail  · Skin cancer (such as Melanoma, Basal Cell Carcinoma, Squamous Cell Carcinoma? No  · Tuberculosis, HIV/AIDS, Hepatitis B or C: No  · Systemic Immunosuppression such as Diabetes, Biologic or Immunotherapy, Chemotherapy, Organ Transplantation, Bone Marrow Transplantation No  · Radiation Treatment No   FAMILY HISTORY:  Any "first degree relatives" (parent, brother, sister, or child) with the following? • Skin Cancer, Pancreatic or Other Cancer? No   PATIENT EXPERIENCE:    • Do you want the Dermatologist to perform a COMPLETE skin exam today including a clinical examination under the "bra and underwear" areas? Yes  • If necessary, do we have your permission to call and leave a detailed message on your Preferred Phone number that includes your specific medical information?   Yes      Allergies   Allergen Reactions   • Other Other (See Comments)     WALNUT      Current Outpatient Medications:   •  CALCIUM PO, Take by mouth, Disp: , Rfl:   •  Eliquis 5 MG, TAKE 1 TABLET BY MOUTH TWO TIMES A DAY, Disp: 90 tablet, Rfl: 3  •  Ferrous Sulfate (IRON PO), Take by mouth, Disp: , Rfl:   •  fluticasone (FLONASE) 50 mcg/act nasal spray, INSTILL ONE SPRAY INTO EACH NOSTRIL DAILY FOR 14 DAYS, Disp: 16 g, Rfl: 1  •  Glutamine 500 MG CAPS, Take by mouth every morning, Disp: , Rfl:   •  multivitamin (THERAGRAN) TABS, Take 1 tablet by mouth daily, Disp: , Rfl:   •  omeprazole (PriLOSEC) 20 mg delayed release capsule, TAKE TWO CAPSULES (40 MG TOTAL) BY MOUTH DAILY, Disp: 90 capsule, Rfl: 0  •  simvastatin (ZOCOR) 40 mg tablet, Take 1 tablet (40 mg total) by mouth daily at bedtime, Disp: 90 tablet, Rfl: 0  •  vitamin B-12 (CYANOCOBALAMIN) 100 MCG TABS, Take 50 mcg by mouth daily, Disp: , Rfl:           • Whom besides the patient is providing clinical information about today's encounter?   o NO ADDITIONAL HISTORIAN (patient alone provided history)  o Patient is here for skin cancer screening  Noted lesions on arms and trunk itches sometimes, stated lesion were treated with cryotherapy in the past  Denies bleeding, burning, pain  Physical Exam and Assessment/Plan by Diagnosis:    MELANOCYTIC NEVI ("Moles")    Physical Exam:  • Anatomic Location Affected:   Mostly on sun-exposed areas of the trunk and extremities  • Morphological Description:  Scattered, 1-4mm round to ovoid, symmetrical-appearing, even bordered, skin colored to dark brown macules/papules, mostly in sun-exposed areas  • Pertinent Positives:  • Pertinent Negatives: Additional History of Present Condition:  Skin exam    Assessment and Plan:  Based on a thorough discussion of this condition and the management approach to it (including a comprehensive discussion of the known risks, side effects and potential benefits of treatment), the patient (family) agrees to implement the following specific plan:  • When outside we recommend using a wide brim hat, sunglasses, long sleeve and pants, sunscreen with SPF 03+ with reapplication every 2 hours, or SPF specific clothing   • Benign, reassured  • Annual skin check     Melanocytic Nevi  Melanocytic nevi ("moles") are tan or brown, raised or flat areas of the skin which have an increased number of melanocytes  Melanocytes are the cells in our body which make pigment and account for skin color      Some moles are present at birth (I e , "congenital nevi"), while others come up later in life (i e , "acquired nevi")  The sun can stimulate the body to make more moles  Sunburns are not the only thing that triggers more moles  Chronic sun exposure can do it too  Clinically distinguishing a healthy mole from melanoma may be difficult, even for experienced dermatologists  The "ABCDE's" of moles have been suggested as a means of helping to alert a person to a suspicious mole and the possible increased risk of melanoma  The suggestions for raising alert are as follows:    Asymmetry: Healthy moles tend to be symmetric, while melanomas are often asymmetric  Asymmetry means if you draw a line through the mole, the two halves do not match in color, size, shape, or surface texture  Asymmetry can be a result of rapid enlargement of a mole, the development of a raised area on a previously flat lesion, scaling, ulceration, bleeding or scabbing within the mole  Any mole that starts to demonstrate "asymmetry" should be examined promptly by a board certified dermatologist      Border: Healthy moles tend to have discrete, even borders  The border of a melanoma often blends into the normal skin and does not sharply delineate the mole from normal skin  Any mole that starts to demonstrate "uneven borders" should be examined promptly by a board certified dermatologist      Color: Healthy moles tend to be one color throughout  Melanomas tend to be made up of different colors ranging from dark black, blue, white, or red  Any mole that demonstrates a color change should be examined promptly by a board certified dermatologist      Diameter: Healthy moles tend to be smaller than 0 6 cm in size; an exception are "congenital nevi" that can be larger  Melanomas tend to grow and can often be greater than 0 6 cm (1/4 of an inch, or the size of a pencil eraser)   This is only a guideline, and many normal moles may be larger than 0 6 cm without being unhealthy  Any mole that starts to change in size (small to bigger or bigger to smaller) should be examined promptly by a board certified dermatologist      Evolving: Healthy moles tend to "stay the same "  Melanomas may often show signs of change or evolution such as a change in size, shape, color, or elevation  Any mole that starts to itch, bleed, crust, burn, hurt, or ulcerate or demonstrate a change or evolution should be examined promptly by a board certified dermatologist         LENTIGO    Physical Exam:  • Anatomic Location Affected:  Trunk and arms  • Morphological Description:  Light brown macules  • Pertinent Positives:  • Pertinent Negatives: Additional History of Present Condition:  Skin exam    Assessment and Plan:  Based on a thorough discussion of this condition and the management approach to it (including a comprehensive discussion of the known risks, side effects and potential benefits of treatment), the patient (family) agrees to implement the following specific plan:  • When outside we recommend using a wide brim hat, sunglasses, long sleeve and pants, sunscreen with SPF 13+ with reapplication every 2 hours, or SPF specific clothing       What is a lentigo? A lentigo is a pigmented flat or slightly raised lesion with a clearly defined edge  Unlike an ephelis (freckle), it does not fade in the winter months  There are several kinds of lentigo  The name lentigo originally referred to its appearance resembling a small lentil  The plural of lentigo is lentigines, although “lentigos” is also in common use  Who gets lentigines? Lentigines can affect males and females of all ages and races  Solar lentigines are especially prevalent in fair skinned adults  Lentigines associated with syndromes are present at birth or arise during childhood  What causes lentigines?   Common forms of lentigo are due to exposure to ultraviolet radiation:  • Sun damage including sunburn   • Indoor tanning • Phototherapy, especially photochemotherapy (PUVA)    Ionizing radiation, eg radiation therapy, can also cause lentigines  Several familial syndromes associated with widespread lentigines originate from mutations in [place]-MAP kinase, mTOR signaling and PTEN pathways  What is the treatment for lentigines? Most lentigines are left alone  Attempts to lighten them may not be successful  The following approaches are used:  • SPF 50+ broad-spectrum sunscreen   • Hydroquinone bleaching cream   • Alpha hydroxy acids   • Vitamin C   • Retinoids   • Azelaic acid   • Chemical peels  Individual lesions can be permanently removed using:  • Cryotherapy   • Intense pulsed light   • Pigment lasers    How can lentigines be prevented? Lentigines associated with exposure ultraviolet radiation can be prevented by very careful sun protection  Clothing is more successful at preventing new lentigines than are sunscreens  What is the outlook for lentigines? Lentigines usually persist  They may increase in number with age and sun exposure  Some in sun-protected sites may fade and disappear  MUSTAFA ANGIOMAS    Physical Exam:  • Anatomic Location Affected:  trunk  • Morphological Description:  Scattered cherry red, 1-4 mm papules  • Pertinent Positives:  • Pertinent Negatives: Additional History of Present Condition:  Skin exam    Assessment and Plan:  Based on a thorough discussion of this condition and the management approach to it (including a comprehensive discussion of the known risks, side effects and potential benefits of treatment), the patient (family) agrees to implement the following specific plan:  • Monitor for changes  • Benign, reassured  •     Assessment and Plan:    Cherry angioma, also known as Cassia Trujillo spots, are benign vascular skin lesions  A "cherry angioma" is a firm red, blue or purple papule, 0 1-1 cm in diameter   When thrombosed, they can appear black in colour until evaluated with a dermatoscope when the red or purple colour is more easily seen  Cherry angioma may develop on any part of the body but most often appear on the scalp, face, lips and trunk  An angioma is due to proliferating endothelial cells; these are the cells that line the inside of a blood vessel  Angiomas can arise in early life or later in life; the most common type of angioma is a cherry angioma  Cherry angiomas are very common in males and females of any age or race  They are more noticeable in white skin than in skin of colour  They markedly increase in number from about the age of 36  There may be a family history of similar lesions  Eruptive cherry angiomas have been rarely reported to be associated with internal malignancy  The cause of angiomas is unknown  Genetic analysis of cherry angiomas has shown that they frequently carry specific somatic missense mutations in the GNAQ and GNA11 (Q209H) genes, which are involved in other vascular and melanocytic proliferations  SEBORRHEIC KERATOSIS; NON-INFLAMED    Physical Exam:  • Anatomic Location Affected:  trunk  • Morphological Description:  Flat and raised, waxy, smooth to warty textured, yellow to brownish-grey to dark brown to blackish, discrete, "stuck-on" appearing papules  • Pertinent Positives:  • Pertinent Negatives: Additional History of Present Condition:  Skin exam    Assessment and Plan:  Based on a thorough discussion of this condition and the management approach to it (including a comprehensive discussion of the known risks, side effects and potential benefits of treatment), the patient (family) agrees to implement the following specific plan:  • Monitor for changes  • Benign, reassured  •     Seborrheic Keratosis  A seborrheic keratosis is a harmless warty spot that appears during adult life as a common sign of skin aging  Seborrheic keratoses can arise on any area of skin, covered or uncovered, with the usual exception of the palms and soles  They do not arise from mucous membranes  Seborrheic keratoses can have highly variable appearance  Seborrheic keratoses are extremely common  It has been estimated that over 90% of adults over the age of 61 years have one or more of them  They occur in males and females of all races, typically beginning to erupt in the 35s or 45s  They are uncommon under the age of 21 years  The precise cause of seborrhoeic keratoses is not known  Seborrhoeic keratoses are considered degenerative in nature  As time goes by, seborrheic keratoses tend to become more numerous  Some people inherit a tendency to develop a very large number of them; some people may have hundreds of them  There is no easy way to remove multiple lesions on a single occasion  Unless a specific lesion is "inflamed" and is causing pain or stinging/burning or is bleeding, most insurance companies do not authorize treatment  XEROSIS ("DRY SKIN")    Physical Exam:  • Anatomic Location Affected:  diffuse  • Morphological Description:  xerosis  • Pertinent Positives:  • Pertinent Negatives: Additional History of Present Condition:  Skin exam    Assessment and Plan:  Based on a thorough discussion of this condition and the management approach to it (including a comprehensive discussion of the known risks, side effects and potential benefits of treatment), the patient (family) agrees to implement the following specific plan:  • Use moisturizer like Eucerin,Cerave or Aveeno Cream 3 times a day for the dry skin   •   •    •     Dry skin refers to skin that feels dry to touch  Dry skin has a dull surface with a rough, scaly quality  The skin is less pliable and cracked  When dryness is severe, the skin may become inflamed and fissured  Although any body site can be dry, dry skin tends to affect the shins more than any other site      Dry skin is lacking moisture in the outer horny cell layer (stratum corneum) and this results in cracks in the skin surface  Dry skin is also called xerosis, xeroderma or asteatosis (lack of fat)  It can affect males and females of all ages  There is some racial variability in water and lipid content of the skin  • Dry skin that starts in early childhood may be one of about 20 types of ichthyosis (fish-scale skin)  There is often a family history of dry skin  • Dry skin is commonly seen in people with atopic dermatitis  • Nearly everyone > 60 years has dry skin  Dry skin that begins later may be seen in people with certain diseases and conditions  • Postmenopausal women  • Hypothyroidism  • Chronic renal disease   • Malnutrition and weight loss   • Subclinical dermatitis   • Treatment with certain drugs such as oral retinoids, diuretics and epidermal growth factor receptor inhibitors      What is the treatment for dry skin? The mainstay of treatment of dry skin and ichthyosis is moisturisers/emollients  They should be applied liberally and often enough to:  • Reduce itch   • Improve the barrier function   • Prevent entry of irritants, bacteria   • Reduce transepidermal water loss  How can dry skin be prevented? Eliminate aggravating factors:  • Reduce the frequency of bathing  • A humidifier in winter and air conditioner in summer   • Compare having a short shower with a prolonged soak in a bath  • Use lukewarm, not hot, water  • Replace standard soap with a substitute such as a synthetic detergent cleanser, water-miscible emollient, bath oil, anti-pruritic tar oil, colloidal oatmeal etc    • Apply an emollient liberally and often, particularly shortly after bathing, and when itchy  The drier the skin, the thicker this should be, especially on the hands  What is the outlook for dry skin? A tendency to dry skin may persist life-long, or it may improve once contributing factors are controlled      NEOPLASM OF UNCERTAIN BEHAVIOR OF SKIN    Physical Exam:  • (Anatomic Location); (Size and Morphological Description); (Differential Diagnosis):  o Specimen A: Right Forearm; 0 8 cm x 0 6 cm pink brown papule (DDX): Rule out lentigo Maligna  • Pertinent Positives:  • Pertinent Negatives: Additional History of Present Condition:  itches    Assessment and Plan:  • I have discussed with the patient that a sample of skin via a "skin biopsy” would be potentially helpful to further make a specific diagnosis under the microscope  • Based on a thorough discussion of this condition and the management approach to it (including a comprehensive discussion of the known risks, side effects and potential benefits of treatment), the patient (family) agrees to implement the following specific plan:    o Procedure:  Skin Biopsy  After a thorough discussion of treatment options and risk/benefits/alternatives (including but not limited to local pain, scarring, dyspigmentation, blistering, possible superinfection, and inability to confirm a diagnosis via histopathology), verbal and written consent were obtained and portion of the rash was biopsied for tissue sample  See below for consent that was obtained from patient and subsequent Procedure Note  PROCEDURE TANGENTIAL (SHAVE) BIOPSY NOTE:    • Performing Physician: Haleigh Lockowod   • Anatomic Location; Clinical Description with size (cm); Pre-Op Diagnosis:   o Specimen A: Right Forearm; 0 8 cm x 0 6 cm pink brown papule (DDX): Rule out lentigo Maligna  • Post-op diagnosis: Same     • Local anesthesia: 1% Lidocaine HCL     • Topical anesthesia: None    • Hemostasis: Electrocautery       After obtaining informed consent  at which time there was a discussion about the purpose of biopsy  and low risks of infection and bleeding  The area was prepped and draped in the usual fashion  Anesthesia was obtained with 1% lidocaine with epinephrine   A shave biopsy to an appropriate sampling depth was obtained by Shave (Dermablade or 15 blade) The resulting wound was covered with surgical ointment and bandaged appropriately  The patient tolerated the procedure well without complications and was without signs of functional compromise  Specimen has been sent for review by Dermatopathology  Standard post-procedure care has been explained and has been included in written form within the patient's copy of Informed Consent  INFORMED CONSENT DISCUSSION AND POST-OPERATIVE INSTRUCTIONS FOR PATIENT    I   RATIONALE FOR PROCEDURE  I understand that a skin biopsy allows the Dermatologist to test a lesion or rash under the microscope to obtain a diagnosis  It usually involves numbing the area with numbing medication and removing a small piece of skin; sometimes the area will be closed with sutures  In this specific procedure, sutures are not usually needed  If any sutures are placed, then they are usually need to be removed in 2 weeks or less  I understand that my Dermatologist recommends that a skin "shave" biopsy be performed today  A local anesthetic, similar to the kind that a dentist uses when filling a cavity, will be injected with a very small needle into the skin area to be sampled  The injected skin and tissue underneath "will go to sleep” and become numb so no pain should be felt afterwards  An instrument shaped like a tiny "razor blade" (shave biopsy instrument) will be used to cut a small piece of tissue and skin from the area so that a sample of tissue can be taken and examined more closely under the microscope  A slight amount of bleeding will occur, but it will be stopped with direct pressure and a pressure bandage and any other appropriate methods  I understands that a scar will form where the wound was created  Surgical ointment will be applied to help protect the wound  Sutures are not usually needed      II   RISKS AND POTENTIAL COMPLICATIONS   I understand the risks and potential complications of a skin biopsy include but are not limited to the following:  • Bleeding  • Infection  • Pain  • Scar/keloid  • Skin discoloration  • Incomplete Removal  • Recurrence  • Nerve Damage/Numbness/Loss of Function  • Allergic Reaction to Anesthesia  • Biopsies are diagnostic procedures and based on findings additional treatment or evaluation may be required  • Loss or destruction of specimen resulting in no additional findings    My Dermatologist has explained to me the nature of the condition, the nature of the procedure, and the benefits to be reasonably expected compared with alternative approaches  My Dermatologist has discussed the likelihood of major risks or complications of this procedure including the specific risks listed above, such as bleeding, infection, and scarring/keloid  I understand that a scar is expected after this procedure  I understand that my physician cannot predict if the scar will form a "keloid," which extends beyond the borders of the wound that is created  A keloid is a thick, painful, and bumpy scar  A keloid can be difficult to treat, as it does not always respond well to therapy, which includes injecting cortisone directly into the keloid every few weeks  While this usually reduces the pain and size of the scar, it does not eliminate it  I understand that photographs may be taken before and after the procedure  These will be maintained as part of the medical providers confidential records and may not be made available to me  I further authorize the medical provider to use the photographs for teaching purposes or to illustrate scientific papers, books, or lectures if in his/her judgment, medical research, education, or science may benefit from its use  I have had an opportunity to fully inquire about the risks and benefits of this procedure and its alternatives  I have been given ample time and opportunity to ask questions and to seek a second opinion if I wished to do so    I acknowledge that there have specifically been no guarantees as to the cosmetic results from the procedure  I am aware that with any procedure there is always the possibility of an unexpected complication  III  POST-PROCEDURAL CARE (WHAT YOU WILL NEED TO DO "AFTER THE BIOPSY" TO OPTIMIZE HEALING)    • Keep the area clean and dry  Try NOT to remove the bandage or get it wet for the first 24 hours  • Gently clean the area and apply surgical ointment (such as Vaseline petrolatum ointment, which is available "over the counter" and not a prescription) to the biopsy site for up to 2 weeks straight  This acts to protect the wound from the outside world  • Sutures are not usually placed in this procedure  If any sutures were placed, return for suture removal as instructed (generally 1 week for the face, 2 weeks for the body)  • Take Acetaminophen (Tylenol) for discomfort, if no contraindications  Ibuprofen or aspirin could make bleeding worse  • Call our office immediately for signs of infection: fever, chills, increased redness, warmth, tenderness, discomfort/pain, or pus or foul smell coming from the wound  WHAT TO DO IF THERE IS ANY BLEEDING? If a small amount of bleeding is noticed, place a clean cloth over the area and apply firm pressure for ten minutes  Check the wound after 10 minutes of direct pressure  If bleeding persists, try one more time for an additional 10 minutes of direct pressure on the area  If the bleeding becomes heavier or does not stop after the second attempt, or if you have any other questions about this procedure, then please call your [facility] Dermatologist by calling [phone] (SKIN)  I hereby acknowledge that I have reviewed and verified the site with my Dermatologist and have requested and authorized my Dermatologist to proceed with the procedure        Scribe Attestation    I,:  Niurka Carson am acting as a scribe while in the presence of the attending physician :       I,:  Yola Craig MD personally performed the services described in this documentation    as scribed in my presence :

## 2023-02-28 NOTE — TELEPHONE ENCOUNTER
Pt c/o labored breathing, heavy breathing  He gets out of breath  He has a pacemaker also  Pt asking for an appt so his lungs can be checked  Pt has had this for maybe a month  Please, triage

## 2023-03-01 LAB
ATRIAL RATE: 61 BPM
P AXIS: 65 DEGREES
PR INTERVAL: 280 MS
QRS AXIS: -77 DEGREES
QRSD INTERVAL: 210 MS
QT INTERVAL: 544 MS
QTC INTERVAL: 547 MS
T WAVE AXIS: 96 DEGREES
VENTRICULAR RATE: 61 BPM

## 2023-03-04 DIAGNOSIS — I48.0 PAF (PAROXYSMAL ATRIAL FIBRILLATION) (HCC): ICD-10-CM

## 2023-03-06 RX ORDER — APIXABAN 5 MG/1
TABLET, FILM COATED ORAL
Qty: 90 TABLET | Refills: 3 | Status: SHIPPED | OUTPATIENT
Start: 2023-03-06

## 2023-03-08 ENCOUNTER — TELEPHONE (OUTPATIENT)
Dept: DERMATOLOGY | Facility: CLINIC | Age: 78
End: 2023-03-08

## 2023-03-08 NOTE — TELEPHONE ENCOUNTER
----- Message from Jose Alfredo Sanchez MD sent at 3/7/2023  9:06 PM EST -----  DERMATOPATHOLOGY RESULT NOTE    Results reviewed by ordering physician  Instructions for Clinical Derm Team:   (remember to route Result Note to appropriate staff):    Call patient and review results  It is a benign growth (keratosis) growing within a mole  It will likely grow back  Can monitor      Result & Plan by Specimen:    Specimen A: benign  Plan: monitor, reassured, benign, and discussed possibility of recurrence

## 2023-03-08 NOTE — RESULT ENCOUNTER NOTE
DERMATOPATHOLOGY RESULT NOTE    Results reviewed by ordering physician  Instructions for Clinical Derm Team:   (remember to route Result Note to appropriate staff):    Call patient and review results  It is a benign growth (keratosis) growing within a mole  It will likely grow back  Can monitor      Result & Plan by Specimen:    Specimen A: benign  Plan: monitor, reassured, benign, and discussed possibility of recurrence

## 2023-03-09 ENCOUNTER — HOSPITAL ENCOUNTER (OUTPATIENT)
Dept: PULMONOLOGY | Facility: HOSPITAL | Age: 78
End: 2023-03-09

## 2023-03-09 DIAGNOSIS — R06.09 DOE (DYSPNEA ON EXERTION): ICD-10-CM

## 2023-03-09 RX ORDER — ALBUTEROL SULFATE 2.5 MG/3ML
2.5 SOLUTION RESPIRATORY (INHALATION) ONCE
Status: COMPLETED | OUTPATIENT
Start: 2023-03-09 | End: 2023-03-09

## 2023-03-09 RX ADMIN — ALBUTEROL SULFATE 2.5 MG: 2.5 SOLUTION RESPIRATORY (INHALATION) at 07:44

## 2023-03-17 PROBLEM — Z00.00 MEDICARE ANNUAL WELLNESS VISIT, SUBSEQUENT: Status: RESOLVED | Noted: 2023-01-16 | Resolved: 2023-03-17

## 2023-03-24 ENCOUNTER — OFFICE VISIT (OUTPATIENT)
Dept: CARDIOLOGY CLINIC | Facility: CLINIC | Age: 78
End: 2023-03-24

## 2023-03-24 ENCOUNTER — TELEPHONE (OUTPATIENT)
Dept: CARDIOLOGY CLINIC | Facility: CLINIC | Age: 78
End: 2023-03-24

## 2023-03-24 VITALS
WEIGHT: 235 LBS | SYSTOLIC BLOOD PRESSURE: 105 MMHG | TEMPERATURE: 97.7 F | BODY MASS INDEX: 31.14 KG/M2 | OXYGEN SATURATION: 98 % | HEIGHT: 73 IN | DIASTOLIC BLOOD PRESSURE: 70 MMHG

## 2023-03-24 DIAGNOSIS — Z95.0 PRESENCE OF CARDIAC PACEMAKER: ICD-10-CM

## 2023-03-24 DIAGNOSIS — R94.39 ABNORMAL NUCLEAR STRESS TEST: Primary | ICD-10-CM

## 2023-03-24 DIAGNOSIS — R94.39 ABNORMAL NUCLEAR STRESS TEST: ICD-10-CM

## 2023-03-24 DIAGNOSIS — I20.0 UNSTABLE ANGINA (HCC): ICD-10-CM

## 2023-03-24 DIAGNOSIS — E66.09 CLASS 1 OBESITY DUE TO EXCESS CALORIES WITHOUT SERIOUS COMORBIDITY WITH BODY MASS INDEX (BMI) OF 31.0 TO 31.9 IN ADULT: ICD-10-CM

## 2023-03-24 DIAGNOSIS — E78.2 MIXED HYPERLIPIDEMIA: ICD-10-CM

## 2023-03-24 DIAGNOSIS — R07.9 CHEST PAIN, UNSPECIFIED TYPE: Primary | ICD-10-CM

## 2023-03-24 DIAGNOSIS — I48.0 PAF (PAROXYSMAL ATRIAL FIBRILLATION) (HCC): ICD-10-CM

## 2023-03-24 DIAGNOSIS — R06.02 SHORTNESS OF BREATH: ICD-10-CM

## 2023-03-24 RX ORDER — ASPIRIN 81 MG/1
81 TABLET ORAL DAILY
COMMUNITY

## 2023-03-24 NOTE — H&P (VIEW-ONLY)
[facility] [place]  [address] [address]  Phone#  [phone]  Fax#  [phone]                                              Cardiology Office Follow up  Neal Vides, 70 y o  male  YOB: 1945  MRN: [id number] Encounter: [id number]      PCP - Chato Fields,     Assessment  Chest pain  Nuclear Rx stress - 6/2021 - EF 45%, Small to moderate-sized moderately severe, partially reversible perfusion defect of apical inferolateral /basal inferoseptal wall, possibly related to artifact, but cannot exclude ischemia  H/o LHC in [place] more than 5 years ago   Echo - 6/10/21 - EF 50-55%, Grade 1 DD, possible hypokinesis of apex, mild MR  Abnormal ECG  Deep TWI in anterolateral leads during native conduction - ?memory TWI related to pacing  s/p [employer]  Otto cardiac pacemaker - dual-chamber, right-sided  For SSS - had 3 5 second pause on holter monitor --> pacemaker (2018)  Paroxysmal Atrial Fibrillation   Hypertension  Hyperlipidemia  GERD  S/p left inguinal hernia repair (12/2020)    Plan  Chest pain, abnormal nuclear stress test  He now reports ongoing symptoms of chest pain/pressure over the last 1 month which is occurring with mild to moderate exertion and has been limiting him from doing some of the activities, and then resolves with rest  Symptoms highly concerning for progressive angina  He has had issues with shortness of breath as well  Reviewed his prior nuclear stress test and he did have concerns for possible ischemia in the inferolateral wall at that time as well - but with lack of clear symptoms, we had held off on cath at that time  ECG recently showed AV dual paced rhythm, nondiagnostic from ischemia sample  Recommend further evaluation with cardiac catheterization  Can hold Eliquis for 3 days preprocedure  Blood pressure on the lower side, not much room for medical therapy  Will add low-dose metoprolol succinate 12 5 mg daily and prescribe nitroglycerin as needed --> counseled regarding use of nitroglycerin  Start aspirin 81 mg daily  Continue simvastatin 40 mg daily for now    Paroxysmal Atrial Fibrillation, s/p [employer] Otto dual chamber pacemaker, SSS  Overview  Pacemaker interrogation in November 2022 had shown 1 AMS event with atrial fibrillation lasting for 1 hour 57 minutes as well as 1 NSVT episode   Recent ECG showed AV dual paced rhythm  He has not had any major issues with palpitations or dizziness , and has not been on aandn  AV rose marie blocker due to low blood pressure  Plan  Start metoprolol succinate 12 5 mg daily  Continue on Eliquis 5 mg b i d  Can hold eliquis for 3 days pre-cath    Hyperlipidemia, Obesity - Body mass index is 31 kg/m²  Cholesterol levels well controlled, LDL 77  Continue simvastatin 40 mg daily for now  If he were to have CAD on cath, then we will plan on switching to atorvastatin 40 mg daily     ECG today -  No results found for this visit on 03/24/23  Orders Placed This Encounter   Procedures   • Echo complete w/ contrast if indicated       Return in about 6 weeks (around 5/5/2023), or if symptoms worsen or fail to improve  History of Present Illness   70 y o   gentleman comes in as a new patient for consultation regarding ongoing symptoms of shortness of breath with exertion  He trades classic cars, 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  He is constantly back and forth between here and [place], and all of his cardiac care so far has been with a cardiologist (Rufina Sheth) in [place], [place]  He reports complains of shortness of breath with exertion, which has been gradually worsening over the past year  He believes it is related to his weight gain and inactivity recently, but  It got worse to a point that he was short of breath with even walking short distances, and as a result he could not go to a trade show recently    No complains of chest pain, nausea or diaphoresis  Due to worsening symptoms, he wanted to get evaluated further and as a result is here for evaluation  No known prior history of CAD  No family history of early CAD  He does not smoke  He reports seeing the cardiologist in [place] due to irregular heartbeats, and cardiac monitors in the past   On one of the cardiac monitor, he was noted to have a 3 5 second pause, as a result of which he was I must to get urgently admitted for a pacemaker in 2018  Interval history - 7/12/2021   he comes back for follow-up after about 2 months  He completed his testing, and is here to discuss results  He continues to report shortness of breath with moderate to severe exertion, but is stable or slightly better  He reports that he went to a car show over the weekend, and had to walk for miles throughout does days, and did not have any major problems with it  He had painted his home over the prior weekend, and reports not having some coughing after this, which is getting better as well  No complains of orthopnea or PND  No complains of chest pain  Interval history - 1/24/2022  He comes back for follow-up after about 6 months  He has been doing well overall and has not had any major complains of chest pain, shortness of breath or any persistent palpitations  He continues to remain active although does not exercise routinely  He states that he goes to car shows frequently, and has to walk long distances during this and does not report any problems with it  Interval history - 11/3/2022  He comes back for follow-up after about 10 months  He has overall been doing well and has not had any symptoms of chest pain, shortness of breath, palpitations  He does report having had 1 episode of dizziness about 2-3 weeks ago, which was associated with some nausea and vomiting  It self resolved and he did not seek medical attention  Over the last 2 weeks he has not had any further recurrences of this    No complains of chest pain, palpitations associated with that episode  Interval history - 3/24/2023  He comes back for follow-up after about 5 months  He now reports having increased symptoms of chest pains/pressure, which has been ongoing over the past 1 month  He states that it initially happened while he was trying to do some physical work   He stopped and rested and with that it resolved  But it continued to occur with similar exertion and as a result he has been trying to limit his activity over the past few weeks  No complaints of rest pain  No nausea vomiting or diaphoresis        Historical Information   Past Medical History:   Diagnosis Date   • Bradycardia     permanent pacemaker   • Colon polyp    • GERD (gastroesophageal reflux disease)    • Hearing aid worn    • High cholesterol    • Hypertension      Past Surgical History:   Procedure Laterality Date   • CARDIAC PACEMAKER PLACEMENT  2017    sees cardiologist in [place]- last visit was 5/2020 and has a monitor to check pacemaker   • [address]     • COLONOSCOPY     • CYSTOSCOPY  2018   • HERNIA REPAIR     • MN RPR RECRT INGUINAL HERNIA ANY AGE REDUCIBLE Left 12/11/2020    Procedure: REPAIR HERNIA INGUINAL;  Surgeon: Odilia Camacho MD;  Location: [address] [place] [address];  Service: General     Family History   Problem Relation Age of Onset   • Cancer Mother    • Colon cancer Mother    • Breast cancer Sister 80     Current Outpatient Medications on File Prior to Visit   Medication Sig Dispense Refill   • aspirin (ECOTRIN LOW STRENGTH) 81 mg EC tablet Take 81 mg by mouth daily     • Eliquis 5 MG TAKE ONE TABLET BY MOUTH TWO TIMES A DAY 90 tablet 3   • Ferrous Sulfate (IRON PO) Take by mouth     • fluticasone (FLONASE) 50 mcg/act nasal spray INSTILL ONE SPRAY INTO EACH NOSTRIL DAILY FOR 14 DAYS 16 g 1   • Glutamine 500 MG CAPS Take by mouth every morning     • multivitamin (THERAGRAN) TABS Take 1 tablet by mouth daily     • omeprazole (PriLOSEC) 20 "mg delayed release capsule Take 1 capsule (20 mg total) by mouth daily 90 capsule 0   • simvastatin (ZOCOR) 40 mg tablet Take 1 tablet (40 mg total) by mouth daily at bedtime 90 tablet 1   • vitamin B-12 (CYANOCOBALAMIN) 100 MCG TABS Take 50 mcg by mouth daily     • CALCIUM PO Take by mouth       No current facility-administered medications on file prior to visit  Allergies   Allergen Reactions   • Other Other (See Comments)     WALNUT     Social History     Socioeconomic History   • Marital status:      Spouse name: None   • Number of children: None   • Years of education: None   • Highest education level: None   Occupational History   • None   Tobacco Use   • Smoking status: Never   • Smokeless tobacco: Never   Vaping Use   • Vaping Use: Never used   Substance and Sexual Activity   • Alcohol use: Yes     Comment: occ   • Drug use: Never   • Sexual activity: Not Currently   Other Topics Concern   • None   Social History Narrative   • None     Social Determinants of Health     Financial Resource Strain: Low Risk    • Difficulty of Paying Living Expenses: Not hard at all   Food Insecurity: Not on file   Transportation Needs: No Transportation Needs   • Lack of Transportation (Medical): No   • Lack of Transportation (Non-Medical): No   Physical Activity: Not on file   Stress: Not on file   Social Connections: Not on file   Intimate Partner Violence: Not on file   Housing Stability: Not on file        Review of Systems   All other systems reviewed and are negative  Vitals:  Vitals:    03/24/23 1328   BP: 105/70   BP Location: Right arm   Patient Position: Sitting   Cuff Size: Standard   Temp: 97 7 °F (36 5 °C)   SpO2: 98%   Weight: 107 kg (235 lb)   Height: 6' 1\" (1 854 m)     BMI - Body mass index is 31 kg/m²    Wt Readings from Last 7 Encounters:   03/24/23 107 kg (235 lb)   02/28/23 107 kg (235 lb)   02/28/23 107 kg (235 lb)   02/07/23 106 kg (234 lb 6 4 oz)   01/16/23 109 kg (241 lb 3 2 oz) " 01/10/23 103 kg (228 lb)   12/13/22 103 kg (228 lb)       Physical Exam  Vitals and nursing note reviewed  Constitutional:       General: He is not in acute distress  Appearance: Normal appearance  He is well-developed  He is not ill-appearing or diaphoretic  HENT:      Head: Normocephalic and atraumatic  Nose: No congestion  Eyes:      General: No scleral icterus  Conjunctiva/sclera: Conjunctivae normal    Neck:      Vascular: No carotid bruit or JVD  Cardiovascular:      Rate and Rhythm: Normal rate and regular rhythm  Heart sounds: Normal heart sounds  No murmur heard  No friction rub  No gallop  Comments: Right upper chest wall cardiac device noted in-situ  No swelling or tenderness surrounding it  Pulmonary:      Effort: Pulmonary effort is normal  No respiratory distress  Breath sounds: Normal breath sounds  No wheezing or rales  Chest:      Chest wall: No tenderness  Abdominal:      General: There is no distension  Palpations: Abdomen is soft  Tenderness: There is no abdominal tenderness  Musculoskeletal:         General: No swelling, tenderness or deformity  Cervical back: Neck supple  No muscular tenderness  Right lower leg: No edema  Left lower leg: No edema  Skin:     General: Skin is warm  Neurological:      General: No focal deficit present  Mental Status: He is alert and oriented to person, place, and time  Mental status is at baseline  Psychiatric:         Mood and Affect: Mood normal          Behavior: Behavior normal          Thought Content:  Thought content normal        Labs:  CBC:   Lab Results   Component Value Date    WBC 5 21 01/16/2023    RBC 4 11 01/16/2023    HGB 12 4 01/16/2023    HCT 38 3 01/16/2023    MCV 93 01/16/2023     01/16/2023    RDW 13 1 01/16/2023       CMP:   Lab Results   Component Value Date    K 4 4 01/16/2023     01/16/2023    CO2 26 01/16/2023    BUN 15 01/16/2023    CREATININE 0 91 01/16/2023    EGFR 82 01/16/2023    CALCIUM 9 1 01/16/2023    AST 24 01/16/2023    ALT 28 01/16/2023    ALKPHOS 63 01/16/2023       Magnesium:  No results found for: MG    Lipid Profile:   Lab Results   Component Value Date    HDL 57 01/16/2023    TRIG 125 01/16/2023    LDLCALC 77 01/16/2023       Thyroid Studies:   Lab Results   Component Value Date    SAG4VTJXCMZY 2 610 01/16/2023       No components found for: HGA1C    No results found for: JCK1    Imaging: No results found  Cardiac testing:   No results found for this or any previous visit  No results found for this or any previous visit  No results found for this or any previous visit  No results found for this or any previous visit

## 2023-03-24 NOTE — LETTER
3/27/2023       Crystal Session              : 1945        MRN: [id number]  [address] [facility] [address]       Procedure Name: LEFT HEART CATHETERIZATION    Procedure date: 23    Location: [facility]  Address: [address] [place] [address], [address] [place] [address]         The hospital will contact you the day prior to your procedure, usually between 4PM - 6PM to instruct you on the time and place to report  If you do not hear from a [facility]  by 6PM the evening prior to your procedure, please contact the campus that you are scheduled at  [place]: [address] [facility]: [address], [place], [address] [facility] [phone]   [facility]: [address] [facility] [place] [phone]    ? You may have nothing to eat or drink from midnight the night prior to your procedure  You may have a minimal amount of water with your morning medications  DO NOT stop taking Plavix or Aspirin unless advised otherwise  ? If your procedure is scheduled after 12:00 noon, you may have clear liquids until 8:00AM the morning of your procedure  Clear liquids are 7UP, Ginger Ale, Jello or broth  ? Arrange for a responsible person to drive you to and from the hospital     ? Please shower/bathe the night before your procedure and do not use powders or lotions  ? Please notify us if you have been admitted to the hospital within the past 30 days  ? Bring a list of daily medications, vitamins, minerals, herbals and nutritional supplements you take  Include dosage and time you take them each day  ? If packing an overnight bag, pack minimal clothing, you will be given hospital sleepwear  Do not bring money, valuables or jewelry  Wedding band is OK  ?  Have your Photo ID and Insurance cards with you     ? DO NOT take any diabetic medication, including insulin, the morning of the procedure  Oral diabetic medications may include: Glucophage, Prandin, Glyburide, Micronase, Avandia, Glocovance, Precose, Glynase y Starlix  ? You should continue to take your morning dose of heart and/or blood pressure medications with a sip of water UNLESS ADVISED OTHERWISE  Special Instructions:    Medication holds:   Eliquis - Hold night prior and morning of procedure       Labs to be done by 3/28/23:  CMP / CBC (fasting)         Thank you,   [address] [place] [address] Cardiology   [address] [facility], [address]  Ph: [phone]

## 2023-03-24 NOTE — TELEPHONE ENCOUNTER
Left voicemail on machine informing patient to call and schedule a Left Heart CATH procedure       Christopher Martinez

## 2023-03-24 NOTE — TELEPHONE ENCOUNTER
I called patient to schedule Left Heart Cath and patient is driving so request I call him back in a few minutes       Thanks,  Christopher

## 2023-03-24 NOTE — PROGRESS NOTES
[facility] [place]  MARKIE Arias [address] [place] [id number]  Phone#  [phone]  Fax#  [phone]                                              Cardiology Office Follow up  Jerald Deras, 70 y o  male  YOB: 1945  MRN: [id number] Encounter: [id number]      PCP - Josy Johnson, DO    Assessment  Chest pain  Nuclear Rx stress - 6/2021 - EF 45%, Small to moderate-sized moderately severe, partially reversible perfusion defect of apical inferolateral /basal inferoseptal wall, possibly related to artifact, but cannot exclude ischemia  H/o LH in [place] more than 5 years ago   Echo - 6/10/21 - EF 50-55%, Grade 1 DD, possible hypokinesis of apex, mild MR  Abnormal ECG  Deep TWI in anterolateral leads during native conduction - ?memory TWI related to pacing  s/p [employer]  Otto cardiac pacemaker - dual-chamber, right-sided  For SSS - had 3 5 second pause on holter monitor --> pacemaker (2018)  Paroxysmal Atrial Fibrillation   Hypertension  Hyperlipidemia  GERD  S/p left inguinal hernia repair (12/2020)    Plan  Chest pain, abnormal nuclear stress test  He now reports ongoing symptoms of chest pain/pressure over the last 1 month which is occurring with mild to moderate exertion and has been limiting him from doing some of the activities, and then resolves with rest  Symptoms highly concerning for progressive angina  He has had issues with shortness of breath as well  Reviewed his prior nuclear stress test and he did have concerns for possible ischemia in the inferolateral wall at that time as well - but with lack of clear symptoms, we had held off on cath at that time  ECG recently showed AV dual paced rhythm, nondiagnostic from ischemia sample  Recommend further evaluation with cardiac catheterization  Can hold Eliquis for 3 days preprocedure  Blood pressure on the lower side, not much room for medical therapy  Will add low-dose metoprolol succinate 12 5 mg daily and prescribe nitroglycerin as needed --> counseled regarding use of nitroglycerin  Start aspirin 81 mg daily  Continue simvastatin 40 mg daily for now    Paroxysmal Atrial Fibrillation, s/p [employer] dual chamber pacemaker, SSS  Overview  Pacemaker interrogation in November 2022 had shown 1 AMS event with atrial fibrillation lasting for 1 hour 57 minutes as well as 1 NSVT episode   Recent ECG showed AV dual paced rhythm  He has not had any major issues with palpitations or dizziness , and has not been on aandn  AV rose marie blocker due to low blood pressure  Plan  Start metoprolol succinate 12 5 mg daily  Continue on Eliquis 5 mg b i d  Can hold eliquis for 3 days pre-cath    Hyperlipidemia, Obesity - Body mass index is 31 kg/m²  Cholesterol levels well controlled, LDL 77  Continue simvastatin 40 mg daily for now  If he were to have CAD on cath, then we will plan on switching to atorvastatin 40 mg daily     ECG today -  No results found for this visit on 03/24/23  Orders Placed This Encounter   Procedures   • Echo complete w/ contrast if indicated       Return in about 6 weeks (around 5/5/2023), or if symptoms worsen or fail to improve  History of Present Illness   70 y o   gentleman comes in as a new patient for consultation regarding ongoing symptoms of shortness of breath with exertion  He trades classic cars, 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  He is constantly back and forth between here and [place], and all of his cardiac care so far has been with a cardiologist (Nathanael Burnett) in [place], [place]  He reports complains of shortness of breath with exertion, which has been gradually worsening over the past year  He believes it is related to his weight gain and inactivity recently, but  It got worse to a point that he was short of breath with even walking short distances, and as a result he could not go to a trade show recently    No complains of chest pain, nausea or diaphoresis  Due to worsening symptoms, he wanted to get evaluated further and as a result is here for evaluation  No known prior history of CAD  No family history of early CAD  He does not smoke  He reports seeing the cardiologist in [place] due to irregular heartbeats, and cardiac monitors in the past   On one of the cardiac monitor, he was noted to have a 3 5 second pause, as a result of which he was I must to get urgently admitted for a pacemaker in 2018  Interval history - 7/12/2021   he comes back for follow-up after about 2 months  He completed his testing, and is here to discuss results  He continues to report shortness of breath with moderate to severe exertion, but is stable or slightly better  He reports that he went to a car show over the weekend, and had to walk for miles throughout does days, and did not have any major problems with it  He had painted his home over the prior weekend, and reports not having some coughing after this, which is getting better as well  No complains of orthopnea or PND  No complains of chest pain  Interval history - 1/24/2022  He comes back for follow-up after about 6 months  He has been doing well overall and has not had any major complains of chest pain, shortness of breath or any persistent palpitations  He continues to remain active although does not exercise routinely  He states that he goes to car shows frequently, and has to walk long distances during this and does not report any problems with it  Interval history - 11/3/2022  He comes back for follow-up after about 10 months  He has overall been doing well and has not had any symptoms of chest pain, shortness of breath, palpitations  He does report having had 1 episode of dizziness about 2-3 weeks ago, which was associated with some nausea and vomiting  It self resolved and he did not seek medical attention  Over the last 2 weeks he has not had any further recurrences of this    No complains of chest pain, palpitations associated with that episode  Interval history - 3/24/2023  He comes back for follow-up after about 5 months  He now reports having increased symptoms of chest pains/pressure, which has been ongoing over the past 1 month  He states that it initially happened while he was trying to do some physical work   He stopped and rested and with that it resolved  But it continued to occur with similar exertion and as a result he has been trying to limit his activity over the past few weeks  No complaints of rest pain  No nausea vomiting or diaphoresis        Historical Information   Past Medical History:   Diagnosis Date   • Bradycardia     permanent pacemaker   • Colon polyp    • GERD (gastroesophageal reflux disease)    • Hearing aid worn    • High cholesterol    • Hypertension      Past Surgical History:   Procedure Laterality Date   • CARDIAC PACEMAKER PLACEMENT  2017    sees cardiologist in [place]- last visit was 5/2020 and has a monitor to check pacemaker   • [address]     • COLONOSCOPY     • CYSTOSCOPY  2018   • HERNIA REPAIR     • MA RPR RECRT INGUINAL HERNIA ANY AGE REDUCIBLE Left 12/11/2020    Procedure: REPAIR HERNIA INGUINAL;  Surgeon: Juany Schofield MD;  Location: [address] [place] [address];  Service: General     Family History   Problem Relation Age of Onset   • Cancer Mother    • Colon cancer Mother    • Breast cancer Sister 80     Current Outpatient Medications on File Prior to Visit   Medication Sig Dispense Refill   • aspirin (ECOTRIN LOW STRENGTH) 81 mg EC tablet Take 81 mg by mouth daily     • Eliquis 5 MG TAKE ONE TABLET BY MOUTH TWO TIMES A DAY 90 tablet 3   • Ferrous Sulfate (IRON PO) Take by mouth     • fluticasone (FLONASE) 50 mcg/act nasal spray INSTILL ONE SPRAY INTO EACH NOSTRIL DAILY FOR 14 DAYS 16 g 1   • Glutamine 500 MG CAPS Take by mouth every morning     • multivitamin (THERAGRAN) TABS Take 1 tablet by mouth daily     • omeprazole (PriLOSEC) 20 mg delayed release capsule Take 1 capsule (20 mg total) by mouth daily 90 capsule 0   • simvastatin (ZOCOR) 40 mg tablet Take 1 tablet (40 mg total) by mouth daily at bedtime 90 tablet 1   • vitamin B-12 (CYANOCOBALAMIN) 100 MCG TABS Take 50 mcg by mouth daily     • CALCIUM PO Take by mouth       No current facility-administered medications on file prior to visit  Allergies   Allergen Reactions   • Other Other (See Comments)     WALNUT     Social History     Socioeconomic History   • Marital status:      Spouse name: None   • Number of children: None   • Years of education: None   • Highest education level: None   Occupational History   • None   Tobacco Use   • Smoking status: Never   • Smokeless tobacco: Never   Vaping Use   • Vaping Use: Never used   Substance and Sexual Activity   • Alcohol use: Yes     Comment: occ   • Drug use: Never   • Sexual activity: Not Currently   Other Topics Concern   • None   Social History Narrative   • None     Social Determinants of Health     Financial Resource Strain: Low Risk    • Difficulty of Paying Living Expenses: Not hard at all   Food Insecurity: Not on file   Transportation Needs: No Transportation Needs   • Lack of Transportation (Medical): No   • Lack of Transportation (Non-Medical): No   Physical Activity: Not on file   Stress: Not on file   Social Connections: Not on file   Intimate Partner Violence: Not on file   Housing Stability: Not on file        Review of Systems   All other systems reviewed and are negative  Vitals:  Vitals:    03/24/23 1328   BP: 105/70   BP Location: Right arm   Patient Position: Sitting   Cuff Size: Standard   Temp: 97 7 °F (36 5 °C)   SpO2: 98%   Weight: 107 kg (235 lb)   Height: 6' 1" (1 854 m)     BMI - Body mass index is 31 kg/m²    Wt Readings from Last 7 Encounters:   03/24/23 107 kg (235 lb)   02/28/23 107 kg (235 lb)   02/28/23 107 kg (235 lb)   02/07/23 106 kg (234 lb 6 4 oz)   01/16/23 109 kg (241 lb 3 2 oz) 01/10/23 103 kg (228 lb)   12/13/22 103 kg (228 lb)       Physical Exam  Vitals and nursing note reviewed  Constitutional:       General: He is not in acute distress  Appearance: Normal appearance  He is well-developed  He is not ill-appearing or diaphoretic  HENT:      Head: Normocephalic and atraumatic  Nose: No congestion  Eyes:      General: No scleral icterus  Conjunctiva/sclera: Conjunctivae normal    Neck:      Vascular: No carotid bruit or JVD  Cardiovascular:      Rate and Rhythm: Normal rate and regular rhythm  Heart sounds: Normal heart sounds  No murmur heard  No friction rub  No gallop  Comments: Right upper chest wall cardiac device noted in-situ  No swelling or tenderness surrounding it  Pulmonary:      Effort: Pulmonary effort is normal  No respiratory distress  Breath sounds: Normal breath sounds  No wheezing or rales  Chest:      Chest wall: No tenderness  Abdominal:      General: There is no distension  Palpations: Abdomen is soft  Tenderness: There is no abdominal tenderness  Musculoskeletal:         General: No swelling, tenderness or deformity  Cervical back: Neck supple  No muscular tenderness  Right lower leg: No edema  Left lower leg: No edema  Skin:     General: Skin is warm  Neurological:      General: No focal deficit present  Mental Status: He is alert and oriented to person, place, and time  Mental status is at baseline  Psychiatric:         Mood and Affect: Mood normal          Behavior: Behavior normal          Thought Content:  Thought content normal        Labs:  CBC:   Lab Results   Component Value Date    WBC 5 21 01/16/2023    RBC 4 11 01/16/2023    HGB 12 4 01/16/2023    HCT 38 3 01/16/2023    MCV 93 01/16/2023     01/16/2023    RDW 13 1 01/16/2023       CMP:   Lab Results   Component Value Date    K 4 4 01/16/2023     01/16/2023    CO2 26 01/16/2023    BUN 15 01/16/2023    CREATININE 0 91 01/16/2023    EGFR 82 01/16/2023    CALCIUM 9 1 01/16/2023    AST 24 01/16/2023    ALT 28 01/16/2023    ALKPHOS 63 01/16/2023       Magnesium:  No results found for: MG    Lipid Profile:   Lab Results   Component Value Date    HDL 57 01/16/2023    TRIG 125 01/16/2023    LDLCALC 77 01/16/2023       Thyroid Studies:   Lab Results   Component Value Date    TCC9RRYDYVOF 2 610 01/16/2023       No components found for: HGA1C    No results found for: OAU8    Imaging: No results found  Cardiac testing:   No results found for this or any previous visit  No results found for this or any previous visit  No results found for this or any previous visit  No results found for this or any previous visit

## 2023-03-25 DIAGNOSIS — R94.39 ABNORMAL NUCLEAR STRESS TEST: ICD-10-CM

## 2023-03-25 DIAGNOSIS — I20.0 UNSTABLE ANGINA (HCC): ICD-10-CM

## 2023-03-27 RX ORDER — NITROGLYCERIN 0.4 MG/1
0.4 TABLET SUBLINGUAL
Qty: 25 TABLET | Refills: 1 | Status: SHIPPED | OUTPATIENT
Start: 2023-03-27

## 2023-03-27 NOTE — TELEPHONE ENCOUNTER
Patient scheduled for Left Heart CATH on 4/5/23 at [facility] with Dr Hank Burrell  Mailed patient instructions  Patient aware of all general instructions  Medication holds:   Eliquis - Hold night prior and morning of procedure  Labs to be done by 3/28/23:  CMP / CBC (fasting)     Insurance: Medicare     Please obtain auth       Thank you,  Kurt Romero

## 2023-03-27 NOTE — TELEPHONE ENCOUNTER
Parrish Noguera is not required   Verified that patient has Medicare primary with a supplement secondary

## 2023-03-28 ENCOUNTER — APPOINTMENT (OUTPATIENT)
Dept: LAB | Facility: MEDICAL CENTER | Age: 78
End: 2023-03-28

## 2023-03-28 ENCOUNTER — TELEPHONE (OUTPATIENT)
Dept: CARDIOLOGY CLINIC | Facility: MEDICAL CENTER | Age: 78
End: 2023-03-28

## 2023-03-28 LAB
ALBUMIN SERPL BCP-MCNC: 4.2 G/DL (ref 3.5–5)
ALP SERPL-CCNC: 63 U/L (ref 46–116)
ALT SERPL W P-5'-P-CCNC: 27 U/L (ref 12–78)
ANION GAP SERPL CALCULATED.3IONS-SCNC: 3 MMOL/L (ref 4–13)
AST SERPL W P-5'-P-CCNC: 21 U/L (ref 5–45)
BASOPHILS # BLD AUTO: 0.02 THOUSANDS/ÂΜL (ref 0–0.1)
BASOPHILS NFR BLD AUTO: 0 % (ref 0–1)
BILIRUB SERPL-MCNC: 0.54 MG/DL (ref 0.2–1)
BUN SERPL-MCNC: 15 MG/DL (ref 5–25)
CALCIUM SERPL-MCNC: 9.3 MG/DL (ref 8.3–10.1)
CHLORIDE SERPL-SCNC: 103 MMOL/L (ref 96–108)
CO2 SERPL-SCNC: 27 MMOL/L (ref 21–32)
CREAT SERPL-MCNC: 0.95 MG/DL (ref 0.6–1.3)
EOSINOPHIL # BLD AUTO: 0.12 THOUSAND/ÂΜL (ref 0–0.61)
EOSINOPHIL NFR BLD AUTO: 2 % (ref 0–6)
ERYTHROCYTE [DISTWIDTH] IN BLOOD BY AUTOMATED COUNT: 13.1 % (ref 11.6–15.1)
GFR SERPL CREATININE-BSD FRML MDRD: 77 ML/MIN/1.73SQ M
GLUCOSE P FAST SERPL-MCNC: 100 MG/DL (ref 65–99)
HCT VFR BLD AUTO: 40.1 % (ref 36.5–49.3)
HGB BLD-MCNC: 13 G/DL (ref 12–17)
IMM GRANULOCYTES # BLD AUTO: 0.01 THOUSAND/UL (ref 0–0.2)
IMM GRANULOCYTES NFR BLD AUTO: 0 % (ref 0–2)
LYMPHOCYTES # BLD AUTO: 2.87 THOUSANDS/ÂΜL (ref 0.6–4.47)
LYMPHOCYTES NFR BLD AUTO: 43 % (ref 14–44)
MCH RBC QN AUTO: 29.8 PG (ref 26.8–34.3)
MCHC RBC AUTO-ENTMCNC: 32.4 G/DL (ref 31.4–37.4)
MCV RBC AUTO: 92 FL (ref 82–98)
MONOCYTES # BLD AUTO: 0.45 THOUSAND/ÂΜL (ref 0.17–1.22)
MONOCYTES NFR BLD AUTO: 7 % (ref 4–12)
NEUTROPHILS # BLD AUTO: 3.27 THOUSANDS/ÂΜL (ref 1.85–7.62)
NEUTS SEG NFR BLD AUTO: 48 % (ref 43–75)
NRBC BLD AUTO-RTO: 0 /100 WBCS
PLATELET # BLD AUTO: 208 THOUSANDS/UL (ref 149–390)
PMV BLD AUTO: 11.1 FL (ref 8.9–12.7)
POTASSIUM SERPL-SCNC: 4.4 MMOL/L (ref 3.5–5.3)
PROT SERPL-MCNC: 7.5 G/DL (ref 6.4–8.4)
RBC # BLD AUTO: 4.36 MILLION/UL (ref 3.88–5.62)
SODIUM SERPL-SCNC: 133 MMOL/L (ref 135–147)
WBC # BLD AUTO: 6.74 THOUSAND/UL (ref 4.31–10.16)

## 2023-04-04 ENCOUNTER — HOSPITAL ENCOUNTER (OUTPATIENT)
Dept: NON INVASIVE DIAGNOSTICS | Facility: CLINIC | Age: 78
Discharge: HOME | End: 2023-04-04

## 2023-04-04 VITALS
SYSTOLIC BLOOD PRESSURE: 105 MMHG | BODY MASS INDEX: 31.14 KG/M2 | HEIGHT: 73 IN | DIASTOLIC BLOOD PRESSURE: 70 MMHG | HEART RATE: 68 BPM | WEIGHT: 235 LBS

## 2023-04-04 DIAGNOSIS — R07.9 CHEST PAIN, UNSPECIFIED TYPE: ICD-10-CM

## 2023-04-04 DIAGNOSIS — R06.02 SHORTNESS OF BREATH: ICD-10-CM

## 2023-04-04 DIAGNOSIS — R94.39 ABNORMAL NUCLEAR STRESS TEST: ICD-10-CM

## 2023-04-04 LAB
AORTIC ROOT: 3.6 CM
AORTIC VALVE MEAN VELOCITY: 6.4 M/S
APICAL FOUR CHAMBER EJECTION FRACTION: 38 %
ASCENDING AORTA: 3.4 CM
AV LVOT MEAN GRADIENT: 1 MMHG
AV LVOT PEAK GRADIENT: 3 MMHG
AV MEAN GRADIENT: 2 MMHG
AV PEAK GRADIENT: 3 MMHG
AV REGURGITATION PRESSURE HALF TIME: 567 MS
AV VELOCITY RATIO: 0.95
DOP CALC AO PEAK VEL: 0.86 M/S
DOP CALC AO VTI: 18.84 CM
DOP CALC LVOT PEAK VEL VTI: 17.25 CM
DOP CALC LVOT PEAK VEL: 0.82 M/S
E WAVE DECELERATION TIME: 164 MS
FRACTIONAL SHORTENING: 18 % (ref 28–44)
GLOBAL LONGITUIDAL STRAIN: -10 %
INTERVENTRICULAR SEPTUM IN DIASTOLE (PARASTERNAL SHORT AXIS VIEW): 1.7 CM
INTERVENTRICULAR SEPTUM: 1.7 CM (ref 0.6–1.1)
LAAS-AP2: 22.5 CM2
LAAS-AP4: 20.2 CM2
LEFT ATRIUM SIZE: 4.6 CM
LEFT INTERNAL DIMENSION IN SYSTOLE: 4.7 CM (ref 2.1–4)
LEFT VENTRICLE DIASTOLIC VOLUME (MOD BIPLANE): 157 ML
LEFT VENTRICLE SYSTOLIC VOLUME (MOD BIPLANE): 97 ML
LEFT VENTRICULAR INTERNAL DIMENSION IN DIASTOLE: 5.7 CM (ref 3.5–6)
LEFT VENTRICULAR POSTERIOR WALL IN END DIASTOLE: 1.2 CM
LEFT VENTRICULAR STROKE VOLUME: 60 ML
LV EF: 38 %
LVSV (TEICH): 60 ML
MV E'TISSUE VEL-SEP: 7 CM/S
MV PEAK A VEL: 0.61 M/S
MV PEAK E VEL: 37 CM/S
MV STENOSIS PRESSURE HALF TIME: 48 MS
MV VALVE AREA P 1/2 METHOD: 4.58 CM2
PISA MRMAX VEL: 0.48 M/S
PISA RADIUS: 0.6 CM
RIGHT ATRIUM AREA SYSTOLE A4C: 19.1 CM2
RIGHT VENTRICLE ID DIMENSION: 5.7 CM
SL CV AV DECELERATION TIME RETROGRADE: 1954 MS
SL CV AV PEAK GRADIENT RETROGRADE: 82 MMHG
SL CV LEFT ATRIUM LENGTH A2C: 6.1 CM
SL CV LV EF: 40
SL CV PED ECHO LEFT VENTRICLE DIASTOLIC VOLUME (MOD BIPLANE) 2D: 161 ML
SL CV PED ECHO LEFT VENTRICLE SYSTOLIC VOLUME (MOD BIPLANE) 2D: 100 ML
TR MAX PG: 34 MMHG
TR PEAK VELOCITY: 2.9 M/S
TRICUSPID ANNULAR PLANE SYSTOLIC EXCURSION: 1.8 CM
TRICUSPID VALVE PEAK REGURGITATION VELOCITY: 2.92 M/S

## 2023-04-04 RX ADMIN — PERFLUTREN 0.4 ML/MIN: 6.52 INJECTION, SUSPENSION INTRAVENOUS at 09:07

## 2023-04-05 ENCOUNTER — HOSPITAL ENCOUNTER (OUTPATIENT)
Facility: HOSPITAL | Age: 78
Setting detail: OUTPATIENT SURGERY
Discharge: HOME | End: 2023-04-05
Attending: INTERNAL MEDICINE | Admitting: INTERNAL MEDICINE

## 2023-04-05 VITALS
DIASTOLIC BLOOD PRESSURE: 72 MMHG | BODY MASS INDEX: 31.14 KG/M2 | OXYGEN SATURATION: 99 % | TEMPERATURE: 96.4 F | RESPIRATION RATE: 18 BRPM | HEART RATE: 60 BPM | WEIGHT: 235 LBS | SYSTOLIC BLOOD PRESSURE: 136 MMHG | HEIGHT: 73 IN

## 2023-04-05 DIAGNOSIS — Z98.890 S/P CARDIAC CATHETERIZATION: Primary | ICD-10-CM

## 2023-04-05 DIAGNOSIS — R94.39 ABNORMAL NUCLEAR STRESS TEST: ICD-10-CM

## 2023-04-05 DIAGNOSIS — I20.0 UNSTABLE ANGINA (HCC): ICD-10-CM

## 2023-04-05 LAB
ANION GAP SERPL CALCULATED.3IONS-SCNC: 7 MMOL/L (ref 4–13)
APTT PPP: 31 SECONDS (ref 23–37)
ATRIAL RATE: 62 BPM
BUN SERPL-MCNC: 16 MG/DL (ref 5–25)
CALCIUM SERPL-MCNC: 9 MG/DL (ref 8.4–10.2)
CHLORIDE SERPL-SCNC: 103 MMOL/L (ref 96–108)
CHOLEST SERPL-MCNC: 161 MG/DL
CO2 SERPL-SCNC: 27 MMOL/L (ref 21–32)
CREAT SERPL-MCNC: 0.88 MG/DL (ref 0.6–1.3)
ERYTHROCYTE [DISTWIDTH] IN BLOOD BY AUTOMATED COUNT: 13.2 % (ref 11.6–15.1)
GFR SERPL CREATININE-BSD FRML MDRD: 83 ML/MIN/1.73SQ M
GLUCOSE P FAST SERPL-MCNC: 102 MG/DL (ref 65–99)
GLUCOSE SERPL-MCNC: 102 MG/DL (ref 65–140)
HCT VFR BLD AUTO: 40.4 % (ref 36.5–49.3)
HDLC SERPL-MCNC: 62 MG/DL
HGB BLD-MCNC: 13.1 G/DL (ref 12–17)
INR PPP: 0.96 (ref 0.84–1.19)
LDLC SERPL CALC-MCNC: 76 MG/DL (ref 0–100)
MCH RBC QN AUTO: 30.2 PG (ref 26.8–34.3)
MCHC RBC AUTO-ENTMCNC: 32.4 G/DL (ref 31.4–37.4)
MCV RBC AUTO: 93 FL (ref 82–98)
NONHDLC SERPL-MCNC: 99 MG/DL
PLATELET # BLD AUTO: 199 THOUSANDS/UL (ref 149–390)
PMV BLD AUTO: 10.4 FL (ref 8.9–12.7)
POTASSIUM SERPL-SCNC: 4.2 MMOL/L (ref 3.5–5.3)
PR INTERVAL: 0 MS
PROTHROMBIN TIME: 13 SECONDS (ref 11.6–14.5)
QRS AXIS: -64 DEGREES
QRSD INTERVAL: 120 MS
QT INTERVAL: 464 MS
QTC INTERVAL: 460 MS
RBC # BLD AUTO: 4.34 MILLION/UL (ref 3.88–5.62)
SODIUM SERPL-SCNC: 137 MMOL/L (ref 135–147)
T WAVE AXIS: -66 DEGREES
TRIGL SERPL-MCNC: 114 MG/DL
VENTRICULAR RATE: 59 BPM
WBC # BLD AUTO: 6.17 THOUSAND/UL (ref 4.31–10.16)

## 2023-04-05 RX ORDER — MIDAZOLAM HYDROCHLORIDE 2 MG/2ML
INJECTION, SOLUTION INTRAMUSCULAR; INTRAVENOUS CODE/TRAUMA/SEDATION MEDICATION
Status: DISCONTINUED | OUTPATIENT
Start: 2023-04-05 | End: 2023-04-05 | Stop reason: HOSPADM

## 2023-04-05 RX ORDER — ASPIRIN 81 MG/1
324 TABLET, CHEWABLE ORAL ONCE
Status: COMPLETED | OUTPATIENT
Start: 2023-04-05 | End: 2023-04-05

## 2023-04-05 RX ORDER — NITROGLYCERIN 20 MG/100ML
INJECTION INTRAVENOUS CODE/TRAUMA/SEDATION MEDICATION
Status: DISCONTINUED | OUTPATIENT
Start: 2023-04-05 | End: 2023-04-05 | Stop reason: HOSPADM

## 2023-04-05 RX ORDER — HEPARIN SODIUM 1000 [USP'U]/ML
INJECTION, SOLUTION INTRAVENOUS; SUBCUTANEOUS CODE/TRAUMA/SEDATION MEDICATION
Status: DISCONTINUED | OUTPATIENT
Start: 2023-04-05 | End: 2023-04-05 | Stop reason: HOSPADM

## 2023-04-05 RX ORDER — VERAPAMIL HCL 2.5 MG/ML
AMPUL (ML) INTRAVENOUS CODE/TRAUMA/SEDATION MEDICATION
Status: DISCONTINUED | OUTPATIENT
Start: 2023-04-05 | End: 2023-04-05 | Stop reason: HOSPADM

## 2023-04-05 RX ORDER — CLOPIDOGREL BISULFATE 75 MG/1
600 TABLET ORAL ONCE
Status: COMPLETED | OUTPATIENT
Start: 2023-04-05 | End: 2023-04-05

## 2023-04-05 RX ORDER — SODIUM CHLORIDE 9 MG/ML
100 INJECTION, SOLUTION INTRAVENOUS CONTINUOUS
Status: DISCONTINUED | OUTPATIENT
Start: 2023-04-05 | End: 2023-04-05 | Stop reason: HOSPADM

## 2023-04-05 RX ORDER — LIDOCAINE HYDROCHLORIDE 10 MG/ML
INJECTION, SOLUTION EPIDURAL; INFILTRATION; INTRACAUDAL; PERINEURAL CODE/TRAUMA/SEDATION MEDICATION
Status: DISCONTINUED | OUTPATIENT
Start: 2023-04-05 | End: 2023-04-05 | Stop reason: HOSPADM

## 2023-04-05 RX ORDER — FENTANYL CITRATE 50 UG/ML
INJECTION, SOLUTION INTRAMUSCULAR; INTRAVENOUS CODE/TRAUMA/SEDATION MEDICATION
Status: DISCONTINUED | OUTPATIENT
Start: 2023-04-05 | End: 2023-04-05 | Stop reason: HOSPADM

## 2023-04-05 RX ORDER — SODIUM CHLORIDE 9 MG/ML
75 INJECTION, SOLUTION INTRAVENOUS CONTINUOUS
Status: DISCONTINUED | OUTPATIENT
Start: 2023-04-05 | End: 2023-04-05 | Stop reason: HOSPADM

## 2023-04-05 RX ADMIN — SODIUM CHLORIDE 75 ML/HR: 0.9 INJECTION, SOLUTION INTRAVENOUS at 09:35

## 2023-04-05 RX ADMIN — ASPIRIN 324 MG: 81 TABLET, CHEWABLE ORAL at 09:32

## 2023-04-05 RX ADMIN — CLOPIDOGREL BISULFATE 600 MG: 75 TABLET ORAL at 09:33

## 2023-04-05 NOTE — PROGRESS NOTES
Painfree vss rhythm Paced w/o ectopy  rt radial TRBand removed, area cleaned  Waterproof bandaid applied  5 add'l bandaids provided to pt  Pt ambulated w/o diff  Post cath instructions given, pt understands restrictions  Stable post cath, for d/c home

## 2023-04-05 NOTE — DISCHARGE INSTR - AVS FIRST PAGE
1  Please see the post cardiac catheterization/ angioseal closure device instructions and stent booklet  No heavy lifting, greater than 10 lbs  or strenuous  activity for 48 hrs  See the post cardiac catheterization/ angioseal closure device instructions  2 Remove band aid tomorrow  Shower and wash area- wrist gently with soap and water- beginning tomorrow  Rinse and pat dry  Apply new water seal band aid  Repeat this process for 5 days  No powders, creams lotions or antibiotic ointments  for 5 days  No tub baths, hot tubs or swimming for 5 days       3 No driving for 2 days

## 2023-04-05 NOTE — INTERVAL H&P NOTE
H&P reviewed  After examining the patient I find no changes in the patients condition since the H&P had been written      Vitals:    04/05/23 0947   BP: 169/88   Pulse: 63   Resp: 18   Temp: (!) 96 1 °F (35 6 °C)   SpO2: 99%     Lungs CTA  Heart tones regular  Symptom free at this time

## 2023-04-07 ENCOUNTER — TELEPHONE (OUTPATIENT)
Dept: CARDIOLOGY CLINIC | Facility: CLINIC | Age: 78
End: 2023-04-07

## 2023-04-07 NOTE — TELEPHONE ENCOUNTER
Pt had cardiac cath on 4/5 and was told to f/u with Dr Frank Castano in 2 weeks  Already has appt sched for May  Does he need to be seen sooner? Msg sent to provider

## 2023-04-13 NOTE — TELEPHONE ENCOUNTER
"Per Dr Gunderson Speaks, pt does not need to be seen sooner if he doing good otherwise  Cardiac cath was normal      Also pt had requested a letter stating he needed to use a golf cart for a swap meet he is attending next week  Per , we would not be able to specify golf cart but can provide a generic letter stating that, \"Due to his cardiac diagnosis, he is unable to walk long distances   Kindly provide reasonable accommodations to help him navigate longer distances      "

## 2023-05-03 DIAGNOSIS — I48.0 PAF (PAROXYSMAL ATRIAL FIBRILLATION) (HCC): ICD-10-CM

## 2023-05-03 RX ORDER — APIXABAN 5 MG/1
TABLET, FILM COATED ORAL
Qty: 180 TABLET | Refills: 3 | Status: SHIPPED | OUTPATIENT
Start: 2023-05-03

## 2023-05-04 NOTE — TELEPHONE ENCOUNTER
Spoke to patient, informed him he does not need to continue with the ASA 81 mg  Due to his recent cath showing no CAD, and to continue on eliquis   He verbalized understanding

## 2023-05-06 DIAGNOSIS — R94.39 ABNORMAL NUCLEAR STRESS TEST: ICD-10-CM

## 2023-05-06 DIAGNOSIS — I20.0 UNSTABLE ANGINA (HCC): ICD-10-CM

## 2023-05-08 ENCOUNTER — REMOTE DEVICE CLINIC VISIT (OUTPATIENT)
Dept: CARDIOLOGY CLINIC | Facility: CLINIC | Age: 78
End: 2023-05-08

## 2023-05-08 DIAGNOSIS — Z95.0 PRESENCE OF CARDIAC PACEMAKER: Primary | ICD-10-CM

## 2023-05-08 RX ORDER — NITROGLYCERIN 0.4 MG/1
0.4 TABLET SUBLINGUAL
Qty: 25 TABLET | Refills: 1 | Status: SHIPPED | OUTPATIENT
Start: 2023-05-08 | End: 2023-05-19 | Stop reason: ALTCHOICE

## 2023-05-08 NOTE — PROGRESS NOTES
Results for orders placed or performed in visit on 05/08/23   Cardiac EP device report    Narrative    [facility] DUAL PM / ACTIVE SYSTEM IS MRI CONDITIONAL  MERLIN TRANSMISSION: BATTERY VOLTAGE ADEQUATE (4 5 YRS)  AP: 51%  : 62% (>40%~DDDR/60)  ALL AVAILABLE LEAD PARAMETERS WITHIN NORMAL LIMITS  NO SIGNIFICANT HIGH RATE EPISODES  PACEMAKER FUNCTIONING APPROPRIATELY    [address] [place] [address]

## 2023-05-18 ENCOUNTER — REMOTE DEVICE CLINIC VISIT (OUTPATIENT)
Dept: CARDIOLOGY CLINIC | Facility: CLINIC | Age: 78
End: 2023-05-18

## 2023-05-18 DIAGNOSIS — Z95.0 PRESENCE OF CARDIAC PACEMAKER: Primary | ICD-10-CM

## 2023-05-18 NOTE — PROGRESS NOTES
Results for orders placed or performed in visit on 05/18/23   Cardiac EP device report    Narrative    [facility] DUAL PM / ACTIVE SYSTEM IS MRI CONDITIONAL  MERLIN TRANSMISSION: BATTERY VOLTAGE ADEQUATE (4 5 YRS)  AP: 48%  : 60% (>40%~DDDR/60)  ALL AVAILABLE LEAD PARAMETERS WITHIN NORMAL LIMITS  NO SIGNIFICANT HIGH RATE EPISODES  PACEMAKER FUNCTIONING APPROPRIATELY    [address] [place] [address]

## 2023-05-19 ENCOUNTER — OFFICE VISIT (OUTPATIENT)
Dept: CARDIOLOGY CLINIC | Facility: CLINIC | Age: 78
End: 2023-05-19

## 2023-05-19 VITALS
HEART RATE: 65 BPM | SYSTOLIC BLOOD PRESSURE: 105 MMHG | WEIGHT: 235 LBS | OXYGEN SATURATION: 98 % | HEIGHT: 73 IN | BODY MASS INDEX: 31.14 KG/M2 | DIASTOLIC BLOOD PRESSURE: 68 MMHG

## 2023-05-19 DIAGNOSIS — I48.0 PAF (PAROXYSMAL ATRIAL FIBRILLATION) (HCC): ICD-10-CM

## 2023-05-19 DIAGNOSIS — Z95.0 PRESENCE OF CARDIAC PACEMAKER: Primary | ICD-10-CM

## 2023-05-19 DIAGNOSIS — E66.09 CLASS 1 OBESITY DUE TO EXCESS CALORIES WITHOUT SERIOUS COMORBIDITY WITH BODY MASS INDEX (BMI) OF 31.0 TO 31.9 IN ADULT: ICD-10-CM

## 2023-05-19 DIAGNOSIS — E78.2 MIXED HYPERLIPIDEMIA: ICD-10-CM

## 2023-05-19 NOTE — PROGRESS NOTES
[facility] [place]  MARKIE Arias [address] [place] [id number]  Phone#  [phone]  Fax#  [phone]                                              Cardiology Office Follow up  Ashleigh Ash, 70 y o  male  YOB: 1945  MRN: [id number] Encounter: [id number]      PCP - Erik Whitlock, DO    Assessment  Chest pain  Nuclear Rx stress - 6/2021 - EF 45%, Small to moderate-sized moderately severe, partially reversible perfusion defect of apical inferolateral /basal inferoseptal wall, possibly related to artifact, but cannot exclude ischemia  H/o LHC in [place] more than 5 years ago   Echo - 6/10/21 - EF 50-55%, Grade 1 DD, possible hypokinesis of apex, mild MR  Abnormal ECG  Deep TWI in anterolateral leads during native conduction - ?memory TWI related to pacing  s/p [employer]  Otto cardiac pacemaker - dual-chamber, right-sided  For SSS - had 3 5 second pause on holter monitor --> pacemaker (2018)  Paroxysmal Atrial Fibrillation   Hypertension  Hyperlipidemia  GERD  S/p left inguinal hernia repair (12/2020)    Plan  Chest pain, h/o abnormal nuclear stress test  Symptoms now mostly resolved  LHC showed minimal luminal irregularities  Risk factor optimization  Continue simvastatin  Monitor for symptoms     Paroxysmal Atrial Fibrillation, s/p [employer] dual chamber pacemaker, SSS  Overview  11/2022: Pacemaker interrogation - 1 AMS event with atrial fibrillation lasting for 1 hour 57 minutes as well as 1 NSVT episode   Started on metoprolol XL 12 5 daily, eliquis  He only took metoprolol 1 time & stopped due to concerns about dizziness  5/2023: device check - no further Afib, normal function  Plan  His blood pressure is already on the lower side, and I suspect he will get more dizzy with metoprolol  Since he has not taken it at all anyways, we will hold off on beta-blockers for now & monitor for afib recurrence on device checks  Continue Eliquis 5 mg twice daily    Hyperlipidemia, Obesity - Body mass index is 31 kg/m²  Well-controlled, LDL 76  Continue simvastatin 40 mg daily   Weight loss recommended    ECG today -  No results found for this visit on 05/19/23  No orders of the defined types were placed in this encounter  Return in about 1 year (around 5/19/2024), or if symptoms worsen or fail to improve  History of Present Illness   70 y o   gentleman comes in as a new patient for consultation regarding ongoing symptoms of shortness of breath with exertion  He trades classic cars, 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  He is constantly back and forth between here and [place], and all of his cardiac care so far has been with a cardiologist (Yola Buck) in [place], [place]  He reports complains of shortness of breath with exertion, which has been gradually worsening over the past year  He believes it is related to his weight gain and inactivity recently, but  It got worse to a point that he was short of breath with even walking short distances, and as a result he could not go to a trade show recently  No complains of chest pain, nausea or diaphoresis  Due to worsening symptoms, he wanted to get evaluated further and as a result is here for evaluation  No known prior history of CAD  No family history of early CAD  He does not smoke  He reports seeing the cardiologist in [place] due to irregular heartbeats, and cardiac monitors in the past   On one of the cardiac monitor, he was noted to have a 3 5 second pause, as a result of which he was I must to get urgently admitted for a pacemaker in 2018  Interval history - 7/12/2021   he comes back for follow-up after about 2 months  He completed his testing, and is here to discuss results  He continues to report shortness of breath with moderate to severe exertion, but is stable or slightly better    He reports that he went to a car show over the weekend, and had to walk for miles throughout does days, and did not have any major problems with it  He had painted his home over the prior weekend, and reports not having some coughing after this, which is getting better as well  No complains of orthopnea or PND  No complains of chest pain  Interval history - 1/24/2022  He comes back for follow-up after about 6 months  He has been doing well overall and has not had any major complains of chest pain, shortness of breath or any persistent palpitations  He continues to remain active although does not exercise routinely  He states that he goes to car shows frequently, and has to walk long distances during this and does not report any problems with it  Interval history - 11/3/2022  He comes back for follow-up after about 10 months  He has overall been doing well and has not had any symptoms of chest pain, shortness of breath, palpitations  He does report having had 1 episode of dizziness about 2-3 weeks ago, which was associated with some nausea and vomiting  It self resolved and he did not seek medical attention  Over the last 2 weeks he has not had any further recurrences of this  No complains of chest pain, palpitations associated with that episode  Interval history - 3/24/2023  He comes back for follow-up after about 5 months  He now reports having increased symptoms of chest pains/pressure, which has been ongoing over the past 1 month  He states that it initially happened while he was trying to do some physical work   He stopped and rested and with that it resolved  But it continued to occur with similar exertion and as a result he has been trying to limit his activity over the past few weeks  No complaints of rest pain  No nausea vomiting or diaphoresis  Interval history - 5/19/2023  He comes back for follow-up after about 2 months  In the interim he completed the left heart cath, which showed minimal luminal irregularities, and no intervention was necessary  No complaints of palpitations, dizziness or lightheadedness  He had questions about disability parking  I reviewed the indications for him with him today, and he is able to walk 200+ ft without stopping, and would not qualify for it from cardiac standpoint  Historical Information   Past Medical History:   Diagnosis Date   • Bradycardia     permanent pacemaker   • Colon polyp    • GERD (gastroesophageal reflux disease)    • Hearing aid worn    • High cholesterol    • Hypertension      Past Surgical History:   Procedure Laterality Date   • CARDIAC CATHETERIZATION Left 4/5/2023    Procedure: Cardiac Left Heart Cath;  Surgeon: Madison Armas MD;  [facility];   Service: Cardiology   • CARDIAC PACEMAKER PLACEMENT  2017    sees cardiologist in [place]- last visit was 5/2020 and has a monitor to check pacemaker   • [address]     • COLONOSCOPY     • CYSTOSCOPY  2018   • HERNIA REPAIR     • NC RPR RECRT INGUINAL HERNIA ANY AGE REDUCIBLE Left 12/11/2020    Procedure: [address];  Surgeon: Nguyen Xie MD;  [facility];  Service: General     Family History   Problem Relation Age of Onset   • Cancer Mother    • Colon cancer Mother    • Breast cancer Sister 80     Current Outpatient Medications on File Prior to Visit   Medication Sig Dispense Refill   • CALCIUM PO Take by mouth     • Eliquis 5 MG TAKE ONE TABLET BY MOUTH TWO TIMES A  tablet 3   • Ferrous Sulfate (IRON PO) Take by mouth     • multivitamin (THERAGRAN) TABS Take 1 tablet by mouth daily     • omeprazole (PriLOSEC) 20 mg delayed release capsule Take 1 capsule (20 mg total) by mouth daily (Patient taking differently: Take 40 mg by mouth daily) 90 capsule 0   • simvastatin (ZOCOR) 40 mg tablet Take 1 tablet (40 mg total) by mouth daily at bedtime 90 tablet 1   • vitamin B-12 (CYANOCOBALAMIN) 100 MCG TABS Take 50 mcg by mouth daily     • [DISCONTINUED] nitroglycerin (NITROSTAT) 0 4 mg SL tablet PLACE 1 "TABLET (0 4 MG TOTAL) UNDER THE TONGUE EVERY 5 (FIVE) MINUTES AS NEEDED FOR CHEST PAIN 25 tablet 1   • fluticasone (FLONASE) 50 mcg/act nasal spray INSTILL ONE SPRAY INTO EACH NOSTRIL DAILY FOR 14 DAYS (Patient not taking: Reported on 5/19/2023) 16 g 1   • Glutamine 500 MG CAPS Take by mouth every morning (Patient not taking: Reported on 5/19/2023)     • [DISCONTINUED] metoprolol succinate (TOPROL-XL) 25 mg 24 hr tablet Take 0 5 tablets (12 5 mg total) by mouth daily (Patient not taking: Reported on 5/19/2023) 30 tablet 3     No current facility-administered medications on file prior to visit  Allergies   Allergen Reactions   • Other Other (See Comments)     WALNUT     Social History     Socioeconomic History   • Marital status:      Spouse name: None   • Number of children: None   • Years of education: None   • Highest education level: None   Occupational History   • None   Tobacco Use   • Smoking status: Never   • Smokeless tobacco: Never   Vaping Use   • Vaping Use: Never used   Substance and Sexual Activity   • Alcohol use: Yes     Comment: occ   • Drug use: Never   • Sexual activity: Not Currently   Other Topics Concern   • None   Social History Narrative   • None     Social Determinants of Health     Financial Resource Strain: Low Risk    • Difficulty of Paying Living Expenses: Not hard at all   Food Insecurity: Not on file   Transportation Needs: No Transportation Needs   • Lack of Transportation (Medical): No   • Lack of Transportation (Non-Medical): No   Physical Activity: Not on file   Stress: Not on file   Social Connections: Not on file   Intimate Partner Violence: Not on file   Housing Stability: Not on file        Review of Systems   All other systems reviewed and are negative        Vitals:  Vitals:    05/19/23 1445   BP: 105/68   BP Location: Left arm   Patient Position: Sitting   Cuff Size: Standard   Pulse: 65   SpO2: 98%   Weight: 107 kg (235 lb)   Height: 6' 1\" (1 854 m)     BMI - Body " mass index is 31 kg/m²  Wt Readings from Last 7 Encounters:   05/19/23 107 kg (235 lb)   04/05/23 107 kg (235 lb)   04/04/23 107 kg (235 lb)   03/24/23 107 kg (235 lb)   02/28/23 107 kg (235 lb)   02/28/23 107 kg (235 lb)   02/07/23 106 kg (234 lb 6 4 oz)       Physical Exam  Vitals and nursing note reviewed  Constitutional:       General: He is not in acute distress  Appearance: Normal appearance  He is well-developed  He is not ill-appearing or diaphoretic  HENT:      Head: Normocephalic and atraumatic  Nose: No congestion  Eyes:      General: No scleral icterus  Conjunctiva/sclera: Conjunctivae normal    Neck:      Vascular: No carotid bruit or JVD  Cardiovascular:      Rate and Rhythm: Normal rate and regular rhythm  Heart sounds: Normal heart sounds  No murmur heard  No friction rub  No gallop  Comments: Right upper chest wall cardiac device noted in-situ  No swelling or tenderness surrounding it  Pulmonary:      Effort: Pulmonary effort is normal  No respiratory distress  Breath sounds: Normal breath sounds  No wheezing or rales  Chest:      Chest wall: No tenderness  Abdominal:      General: There is no distension  Palpations: Abdomen is soft  Tenderness: There is no abdominal tenderness  Musculoskeletal:         General: No swelling, tenderness or deformity  Cervical back: Neck supple  No muscular tenderness  Right lower leg: No edema  Left lower leg: No edema  Skin:     General: Skin is warm  Neurological:      General: No focal deficit present  Mental Status: He is alert and oriented to person, place, and time  Mental status is at baseline  Psychiatric:         Mood and Affect: Mood normal          Behavior: Behavior normal          Thought Content:  Thought content normal        Labs:  CBC:   Lab Results   Component Value Date    WBC 6 17 04/05/2023    RBC 4 34 04/05/2023    HGB 13 1 04/05/2023    HCT 40 4 04/05/2023    MCV 93 04/05/2023     04/05/2023    RDW 13 2 04/05/2023       CMP:   Lab Results   Component Value Date    K 4 2 04/05/2023     04/05/2023    CO2 27 04/05/2023    BUN 16 04/05/2023    CREATININE 0 88 04/05/2023    EGFR 83 04/05/2023    CALCIUM 9 0 04/05/2023    AST 21 03/28/2023    ALT 27 03/28/2023    ALKPHOS 63 03/28/2023       Magnesium:  No results found for: MG    Lipid Profile:   Lab Results   Component Value Date    HDL 62 04/05/2023    TRIG 114 04/05/2023    LDLCALC 76 04/05/2023       Thyroid Studies:   Lab Results   Component Value Date    ONH8ABTSLJPH 2 610 01/16/2023       No components found for: [employer] [facility]    Lab Results   Component Value Date    INR 0 96 04/05/2023   5    Imaging: No results found  Cardiac testing:   No results found for this or any previous visit  No results found for this or any previous visit  No results found for this or any previous visit  No results found for this or any previous visit

## 2023-05-24 DIAGNOSIS — K21.9 GASTROESOPHAGEAL REFLUX DISEASE, UNSPECIFIED WHETHER ESOPHAGITIS PRESENT: ICD-10-CM

## 2023-05-24 RX ORDER — OMEPRAZOLE 20 MG/1
CAPSULE, DELAYED RELEASE ORAL
Qty: 90 CAPSULE | Refills: 0 | Status: SHIPPED | OUTPATIENT
Start: 2023-05-24

## 2023-06-13 DIAGNOSIS — K21.9 GASTROESOPHAGEAL REFLUX DISEASE, UNSPECIFIED WHETHER ESOPHAGITIS PRESENT: Primary | ICD-10-CM

## 2023-06-13 RX ORDER — OMEPRAZOLE 40 MG/1
40 CAPSULE, DELAYED RELEASE ORAL DAILY
Qty: 90 CAPSULE | Refills: 1 | Status: SHIPPED | OUTPATIENT
Start: 2023-06-13 | End: 2023-08-10

## 2023-06-13 RX ORDER — OMEPRAZOLE 40 MG/1
40 CAPSULE, DELAYED RELEASE ORAL DAILY
COMMUNITY
End: 2023-06-13 | Stop reason: SDUPTHER

## 2023-07-10 ENCOUNTER — TRANSITIONAL CARE MANAGEMENT (OUTPATIENT)
Dept: FAMILY MEDICINE CLINIC | Facility: MEDICAL CENTER | Age: 78
End: 2023-07-10

## 2023-07-15 DIAGNOSIS — R09.81 NASAL CONGESTION: ICD-10-CM

## 2023-07-17 RX ORDER — FLUTICASONE PROPIONATE 50 MCG
SPRAY, SUSPENSION (ML) NASAL
Qty: 16 G | Refills: 1 | Status: SHIPPED | OUTPATIENT
Start: 2023-07-17

## 2023-08-09 DIAGNOSIS — E78.5 HYPERLIPIDEMIA, UNSPECIFIED HYPERLIPIDEMIA TYPE: ICD-10-CM

## 2023-08-09 RX ORDER — SIMVASTATIN 40 MG
TABLET ORAL
Qty: 90 TABLET | Refills: 1 | Status: SHIPPED | OUTPATIENT
Start: 2023-08-09

## 2023-08-10 DIAGNOSIS — K21.9 GASTROESOPHAGEAL REFLUX DISEASE, UNSPECIFIED WHETHER ESOPHAGITIS PRESENT: ICD-10-CM

## 2023-08-10 RX ORDER — OMEPRAZOLE 40 MG/1
40 CAPSULE, DELAYED RELEASE ORAL DAILY
Qty: 90 CAPSULE | Refills: 1 | Status: SHIPPED | OUTPATIENT
Start: 2023-08-10

## 2023-10-02 ENCOUNTER — REMOTE DEVICE CLINIC VISIT (OUTPATIENT)
Dept: CARDIOLOGY CLINIC | Facility: CLINIC | Age: 78
End: 2023-10-02
Payer: MEDICARE

## 2023-10-02 DIAGNOSIS — Z95.0 CARDIAC PACEMAKER IN SITU: Primary | ICD-10-CM

## 2023-10-02 PROCEDURE — 93296 REM INTERROG EVL PM/IDS: CPT | Performed by: INTERNAL MEDICINE

## 2023-10-02 PROCEDURE — 93294 REM INTERROG EVL PM/LDLS PM: CPT | Performed by: INTERNAL MEDICINE

## 2023-10-02 NOTE — PROGRESS NOTES
[facility] DUAL PM / ACTIVE SYSTEM IS MRI CONDITIONAL   MERLIN TRANSMISSION: BATTERY VOLTAGE ADEQUATE (4.1 YRS). AP-68%, -71% (>40% [email or web address]). ALL AVAILABLE LEAD PARAMETERS WITHIN NORMAL LIMITS. NO SIGNIFICANT HIGH RATE EPISODES. NORMAL DEVICE FUNCTION.  GV

## 2023-10-10 DIAGNOSIS — I20.0 UNSTABLE ANGINA (HCC): ICD-10-CM

## 2023-10-10 DIAGNOSIS — R94.39 ABNORMAL NUCLEAR STRESS TEST: ICD-10-CM

## 2023-10-10 RX ORDER — NITROGLYCERIN 0.4 MG/1
TABLET SUBLINGUAL
Qty: 25 TABLET | Refills: 1 | Status: SHIPPED | OUTPATIENT
Start: 2023-10-10

## 2023-10-23 NOTE — PROGRESS NOTES
For low libido:  Arginine - 75 mg daily (there is a product called ArginMax for women made by [facility] that works well.) Results for orders placed or performed in visit on 05/06/22   Cardiac EP device report    Narrative    [facility] DUAL PM / ACTIVE SYSTEM IS MRI CONDITIONAL  MERLIN TRANSMISSION: BATTERY VOLTAGE ADEQUATE (9 2-9 9 YRS)  AP-47%, -58% (>40% [email or web address])  ALL AVAILABLE LEAD PARAMETERS WITHIN NORMAL LIMITS  7 AMS EPISODES MAX DURATION 11 5 MINS- PAF/PAFLUTTER ON EGM'S  HX: PAF & ON ELIQUIS  NORMAL DEVICE FUNCTION   GV

## 2023-10-26 DIAGNOSIS — R09.81 NASAL CONGESTION: ICD-10-CM

## 2023-10-26 RX ORDER — FLUTICASONE PROPIONATE 50 MCG
SPRAY, SUSPENSION (ML) NASAL
Qty: 16 G | Refills: 1 | Status: SHIPPED | OUTPATIENT
Start: 2023-10-26

## 2023-11-02 DIAGNOSIS — E78.5 HYPERLIPIDEMIA, UNSPECIFIED HYPERLIPIDEMIA TYPE: ICD-10-CM

## 2023-11-02 RX ORDER — SIMVASTATIN 40 MG
TABLET ORAL
Qty: 90 TABLET | Refills: 1 | Status: SHIPPED | OUTPATIENT
Start: 2023-11-02

## 2023-12-20 DIAGNOSIS — R09.81 NASAL CONGESTION: ICD-10-CM

## 2023-12-20 RX ORDER — FLUTICASONE PROPIONATE 50 MCG
SPRAY, SUSPENSION (ML) NASAL
Qty: 16 G | Refills: 1 | Status: SHIPPED | OUTPATIENT
Start: 2023-12-20

## 2024-01-18 ENCOUNTER — REMOTE DEVICE CLINIC VISIT (OUTPATIENT)
Dept: CARDIOLOGY CLINIC | Facility: CLINIC | Age: 79
End: 2024-01-18
Payer: MEDICARE

## 2024-01-18 DIAGNOSIS — Z95.0 PRESENCE OF PERMANENT CARDIAC PACEMAKER: Primary | ICD-10-CM

## 2024-01-18 PROCEDURE — 93294 REM INTERROG EVL PM/LDLS PM: CPT | Performed by: INTERNAL MEDICINE

## 2024-01-18 PROCEDURE — 93296 REM INTERROG EVL PM/IDS: CPT | Performed by: INTERNAL MEDICINE

## 2024-01-18 NOTE — PROGRESS NOTES
Results for orders placed or performed in visit on 01/18/24   Cardiac EP device report    Narrative    [facility] DUAL PM / ACTIVE SYSTEM IS MRI CONDITIONAL  MERLIN TRANSMISSION:BATTERY VOLTAGE ADEQUATE. (3.8 YRS) AP 68%  71% ALL AVAILABLE LEAD PARAMETERS WITHIN NORMAL LIMITS. NO SIGNIFICANT HIGH RATE EPISODES. NORMAL DEVICE FUNCTION.---DIAZ

## 2024-03-19 ENCOUNTER — IN-CLINIC DEVICE VISIT (OUTPATIENT)
Dept: CARDIOLOGY CLINIC | Facility: MEDICAL CENTER | Age: 79
End: 2024-03-19
Payer: MEDICARE

## 2024-03-19 DIAGNOSIS — Z95.0 PRESENCE OF PERMANENT CARDIAC PACEMAKER: Primary | ICD-10-CM

## 2024-03-19 PROCEDURE — 93280 PM DEVICE PROGR EVAL DUAL: CPT | Performed by: INTERNAL MEDICINE

## 2024-03-19 NOTE — PROGRESS NOTES
[facility] DC PM/ACTIVE SYSTEM IS MRI CONDITIONAL   DEVICE INTERROGATED IN THE [facility] [employer] OFFICE:  BATTERY VOLTAGE ADEQUATE (3.6 YR.).  AP 54%  67% (>40%/AVB/DDDR 60 PPM,  MS + 160 MS VIP).  ALL LEAD PARAMETERS WITHIN NORMAL LIMITS.  NO HIGH RATE EPISODES.  NO PROGRAMMING CHANGES MADE TO DEVICE PARAMETERS.  NORMAL DEVICE FUNCTION.  RG

## 2024-03-20 DIAGNOSIS — K21.9 GASTROESOPHAGEAL REFLUX DISEASE, UNSPECIFIED WHETHER ESOPHAGITIS PRESENT: ICD-10-CM

## 2024-03-20 RX ORDER — OMEPRAZOLE 40 MG/1
40 CAPSULE, DELAYED RELEASE ORAL DAILY
Qty: 90 CAPSULE | Refills: 1 | Status: SHIPPED | OUTPATIENT
Start: 2024-03-20

## 2024-03-22 DIAGNOSIS — R09.81 NASAL CONGESTION: ICD-10-CM

## 2024-03-26 RX ORDER — FLUTICASONE PROPIONATE 50 MCG
SPRAY, SUSPENSION (ML) NASAL
Qty: 16 G | Refills: 1 | Status: SHIPPED | OUTPATIENT
Start: 2024-03-26

## 2024-04-02 DIAGNOSIS — I48.0 PAF (PAROXYSMAL ATRIAL FIBRILLATION) (HCC): ICD-10-CM

## 2024-04-02 RX ORDER — APIXABAN 5 MG/1
TABLET, FILM COATED ORAL
Qty: 180 TABLET | Refills: 1 | Status: SHIPPED | OUTPATIENT
Start: 2024-04-02

## 2024-04-22 ENCOUNTER — OFFICE VISIT (OUTPATIENT)
Dept: SURGERY | Facility: CLINIC | Age: 79
End: 2024-04-22
Payer: MEDICARE

## 2024-04-22 VITALS
HEIGHT: 73 IN | SYSTOLIC BLOOD PRESSURE: 130 MMHG | WEIGHT: 230 LBS | HEART RATE: 65 BPM | TEMPERATURE: 98.3 F | BODY MASS INDEX: 30.48 KG/M2 | OXYGEN SATURATION: 98 % | DIASTOLIC BLOOD PRESSURE: 78 MMHG

## 2024-04-22 DIAGNOSIS — K42.9 UMBILICAL HERNIA: Primary | ICD-10-CM

## 2024-04-22 PROBLEM — Z23 NEED FOR TDAP VACCINATION: Status: RESOLVED | Noted: 2021-08-05 | Resolved: 2024-04-22

## 2024-04-22 PROBLEM — Z91.89 INCREASED RISK OF BREAST CANCER: Status: RESOLVED | Noted: 2021-11-02 | Resolved: 2024-04-22

## 2024-04-22 PROBLEM — N64.4 BREAST PAIN: Status: RESOLVED | Noted: 2021-11-02 | Resolved: 2024-04-22

## 2024-04-22 PROBLEM — K40.90 LEFT INGUINAL HERNIA: Status: RESOLVED | Noted: 2020-12-11 | Resolved: 2024-04-22

## 2024-04-22 PROCEDURE — 99203 OFFICE O/P NEW LOW 30 MIN: CPT | Performed by: SURGERY

## 2024-04-22 NOTE — PROGRESS NOTES
"Assessment/Plan:     Diagnoses and all orders for this visit:    Umbilical hernia     Patient is being scheduled for repair of umbilical hernia with mesh at [facility].    Procedure was discussed with patient and informed consent obtained.    Risks include bleeding, infection, recurrence    Patient will need cardiac clearance prior to surgery  Patient will stop Eliquis 2 days prior to surgery.  He can continue on aspirin.    PATs: CBC, BMP, EKG    Subjective:      Patient ID: Prince Branch is a 79 y.o. male.  Retired male    HPI  Patient presents to the office with more than 1 year history of a symptomatic umbilical hernia.  The hernia is painful.    Patient had undergone a Left inguinal herniorrhaphy by me 3 years ago.  He has no complaints from that.  He had a right inguinal herniorrhaphy as a child.    The following portions of the patient's history were reviewed and updated as appropriate: allergies, current medications, past family history, past medical history, past social history, past surgical history, and problem list.    Review of Systems    Patient suffers with heart disease.  He had cardiac catheterization done in [place] in December 2023.  He was told that he has no critical blockage.  His ejection fraction is 45%.  He has no history of myocardial infarction or chest pain.  He has dyspnea on exertion.  He is on Eliquis.  He has sick sinus syndrome and he has a pacemaker.    Patient denies history of pulmonary disease.  He has no hypertension or diabetes.  No history of cerebrovascular disease.    Objective:    /78 (BP Location: Left arm, Patient Position: Sitting, Cuff Size: Standard)   Pulse 65   Temp 98.3 °F (36.8 °C) (Tympanic)   Ht 6' 1\" (1.854 m)   Wt 104 kg (230 lb)   SpO2 98%   BMI 30.34 kg/m²      Physical Exam    Mild pallor.  No icterus  No cervical lymphadenopathy    Heart sounds are normal.  Patient is in sinus rhythm.  There is a pacemaker in the right infraclavicular " area  Chest is clear to auscultation  Abdominal exam reveals a reducible tender umbilical hernia.  Fascial defect is about 2.5 cm.  There is no abdominal masses palpable.  There is a right inguinal herniorrhaphy scar.  External genitalia are normal.    Extremity exam is normal.

## 2024-04-22 NOTE — H&P (VIEW-ONLY)
"Assessment/Plan:     Diagnoses and all orders for this visit:    Umbilical hernia     Patient is being scheduled for repair of umbilical hernia with mesh at [facility].    Procedure was discussed with patient and informed consent obtained.    Risks include bleeding, infection, recurrence    Patient will need cardiac clearance prior to surgery  Patient will stop Eliquis 2 days prior to surgery.  He can continue on aspirin.    PATs: CBC, BMP, EKG    Subjective:      Patient ID: Prince Branch is a 79 y.o. male.  Retired male    HPI  Patient presents to the office with more than 1 year history of a symptomatic umbilical hernia.  The hernia is painful.    Patient had undergone a Left inguinal herniorrhaphy by me 3 years ago.  He has no complaints from that.  He had a right inguinal herniorrhaphy as a child.    The following portions of the patient's history were reviewed and updated as appropriate: allergies, current medications, past family history, past medical history, past social history, past surgical history, and problem list.    Review of Systems    Patient suffers with heart disease.  He had cardiac catheterization done in [place] in December 2023.  He was told that he has no critical blockage.  His ejection fraction is 45%.  He has no history of myocardial infarction or chest pain.  He has dyspnea on exertion.  He is on Eliquis.  He has sick sinus syndrome and he has a pacemaker.    Patient denies history of pulmonary disease.  He has no hypertension or diabetes.  No history of cerebrovascular disease.    Objective:    /78 (BP Location: Left arm, Patient Position: Sitting, Cuff Size: Standard)   Pulse 65   Temp 98.3 °F (36.8 °C) (Tympanic)   Ht 6' 1\" (1.854 m)   Wt 104 kg (230 lb)   SpO2 98%   BMI 30.34 kg/m²      Physical Exam    Mild pallor.  No icterus  No cervical lymphadenopathy    Heart sounds are normal.  Patient is in sinus rhythm.  There is a pacemaker in the right infraclavicular " area  Chest is clear to auscultation  Abdominal exam reveals a reducible tender umbilical hernia.  Fascial defect is about 2.5 cm.  There is no abdominal masses palpable.  There is a right inguinal herniorrhaphy scar.  External genitalia are normal.    Extremity exam is normal.

## 2024-04-26 ENCOUNTER — OFFICE VISIT (OUTPATIENT)
Dept: CARDIOLOGY CLINIC | Facility: CLINIC | Age: 79
End: 2024-04-26
Payer: MEDICARE

## 2024-04-26 ENCOUNTER — APPOINTMENT (OUTPATIENT)
Dept: LAB | Facility: MEDICAL CENTER | Age: 79
End: 2024-04-26
Payer: MEDICARE

## 2024-04-26 VITALS
DIASTOLIC BLOOD PRESSURE: 80 MMHG | BODY MASS INDEX: 31.28 KG/M2 | HEIGHT: 73 IN | SYSTOLIC BLOOD PRESSURE: 136 MMHG | HEART RATE: 59 BPM | OXYGEN SATURATION: 98 % | WEIGHT: 236 LBS

## 2024-04-26 DIAGNOSIS — I42.8 NON-ISCHEMIC CARDIOMYOPATHY (HCC): ICD-10-CM

## 2024-04-26 DIAGNOSIS — Z01.810 PREOP CARDIOVASCULAR EXAM: Primary | ICD-10-CM

## 2024-04-26 DIAGNOSIS — E78.2 MIXED HYPERLIPIDEMIA: ICD-10-CM

## 2024-04-26 DIAGNOSIS — I48.0 PAF (PAROXYSMAL ATRIAL FIBRILLATION) (HCC): ICD-10-CM

## 2024-04-26 DIAGNOSIS — Z95.0 CARDIAC PACEMAKER IN SITU: ICD-10-CM

## 2024-04-26 DIAGNOSIS — Z01.810 PREOP CARDIOVASCULAR EXAM: ICD-10-CM

## 2024-04-26 DIAGNOSIS — K42.9 UMBILICAL HERNIA: ICD-10-CM

## 2024-04-26 DIAGNOSIS — E66.09 CLASS 1 OBESITY DUE TO EXCESS CALORIES WITHOUT SERIOUS COMORBIDITY WITH BODY MASS INDEX (BMI) OF 31.0 TO 31.9 IN ADULT: ICD-10-CM

## 2024-04-26 DIAGNOSIS — R07.89 CHEST PRESSURE: ICD-10-CM

## 2024-04-26 PROBLEM — I49.5 SICK SINUS SYNDROME (HCC): Chronic | Status: RESOLVED | Noted: 2020-11-12 | Resolved: 2024-04-26

## 2024-04-26 LAB
BNP SERPL-MCNC: 317 PG/ML (ref 0–100)
ERYTHROCYTE [DISTWIDTH] IN BLOOD BY AUTOMATED COUNT: 13.2 % (ref 11.6–15.1)
HCT VFR BLD AUTO: 38.7 % (ref 36.5–49.3)
HGB BLD-MCNC: 12.4 G/DL (ref 12–17)
MCH RBC QN AUTO: 30.6 PG (ref 26.8–34.3)
MCHC RBC AUTO-ENTMCNC: 32 G/DL (ref 31.4–37.4)
MCV RBC AUTO: 96 FL (ref 82–98)
PLATELET # BLD AUTO: 180 THOUSANDS/UL (ref 149–390)
PMV BLD AUTO: 11.7 FL (ref 8.9–12.7)
RBC # BLD AUTO: 4.05 MILLION/UL (ref 3.88–5.62)
WBC # BLD AUTO: 5.62 THOUSAND/UL (ref 4.31–10.16)

## 2024-04-26 PROCEDURE — 80048 BASIC METABOLIC PNL TOTAL CA: CPT

## 2024-04-26 PROCEDURE — 83880 ASSAY OF NATRIURETIC PEPTIDE: CPT

## 2024-04-26 PROCEDURE — 85027 COMPLETE CBC AUTOMATED: CPT

## 2024-04-26 PROCEDURE — 99214 OFFICE O/P EST MOD 30 MIN: CPT | Performed by: INTERNAL MEDICINE

## 2024-04-26 PROCEDURE — 36415 COLL VENOUS BLD VENIPUNCTURE: CPT

## 2024-04-26 PROCEDURE — 93000 ELECTROCARDIOGRAM COMPLETE: CPT | Performed by: INTERNAL MEDICINE

## 2024-04-26 RX ORDER — ISOSORBIDE MONONITRATE 30 MG/1
30 TABLET, EXTENDED RELEASE ORAL DAILY
Qty: 30 TABLET | Refills: 2 | Status: SHIPPED | OUTPATIENT
Start: 2024-04-26

## 2024-04-26 RX ORDER — SACUBITRIL AND VALSARTAN 24; 26 MG/1; MG/1
1 TABLET, FILM COATED ORAL 2 TIMES DAILY
Qty: 60 TABLET | Refills: 3 | Status: SHIPPED | OUTPATIENT
Start: 2024-04-26

## 2024-04-26 NOTE — PROGRESS NOTES
[facility] [place]  [address]  [place] [id number] [phone]                                                Cardiology Office Follow up  Prince Branch, 79 y.o. male  YOB: 1945  MRN: [id number] Encounter: [id number]      PCP - Luzmaria Chaney, DO    Assessment  Chest pressure  Nuclear Rx stress - 6/2021 - EF 45%, Small to moderate-sized moderately severe, partially reversible perfusion defect of apical inferolateral /basal inferoseptal wall, possibly related to artifact, but cannot exclude ischemia  [facility] 4/2023, 12/2023 - no obstructive CAD  CTA dissection protocol (7/2023, [place]) - no dissection, coronary calcification, no reported anomalies  Echo - 6/10/21 - EF 50-55%, Grade 1 DD, possible hypokinesis of apex, mild MR  Abnormal ECG  Deep TWI in anterolateral leads during native conduction - ?memory TWI related to pacing  Dilated cardiomyopathy, LVEF 40%  LBBB  [facility] - 12/2023 - no significant obstructive CAD  s/p [employer] Otto cardiac pacemaker - dual-chamber, right-sided (2018, [employer] [place])  For SSS - had 3.5 second pause on holter monitor --> pacemaker (2018)  Paroxysmal Atrial Fibrillation   Hypertension  Hyperlipidemia  GERD  S/p left inguinal hernia repair (12/2020)    Plan  Chest heaviness/pressure, Non-ischemic cardiomyopathy  Overview  Ongoing symptoms over the last year, at times with exertion over the last year  [facility] in 4/2023 & again in 12/2023 ([place]) --> no significant obstructive CAD  4/2024: he still has some symptoms of on & off chest pressure at times during exertion -   Impression  Unclear etiology, clearly does not have obstructive CAD based on 2 caths  ?PHTN/chest congestion v esophageal spasm v coronary vasospasm  Plan  Optimize medical therapy for cardiomyopathy  Trial of Imdur 30 mg daily  BP now higher at 136/80 and he now has some room to add heart failure therapy  Start Entresto 24/26 mg twice daily  Check repeat echocardiogram    Pre-operative  cardiovascular evaluation  Planned procedure: Hernia surgery ()  Active cardiac complaints: None  Cardiac co-morbidities: None  Functional status: Active. Able to walk around the house, go up a flight of stairs, and manually washed 7 cars yesterday without any symptoms.  Vitals - reviewed and stable  ECG - A-sensed, V-paced rhythm  Echo - 4/2023: LVEF 40%, global hypokinesis, dilated RV, mild to moderate AI, mild MR/TR  Check updated echocardiogram now  If LVEF unchanged, and no severe valvular dysfunction, then he will be okay to proceed with planned surgery as moderate cardiac risk  Alfie-operative cardiac medication management  Anticoagulation/anti-platelets  Eliquis --> okay to hold for upto 3 days pre-operatively, and resume post-op at earliest acceptable time  Continue beta-blockers alfie-operatively    Paroxysmal Atrial Fibrillation, s/p St.Otto dual chamber pacemaker, SSS  Overview  11/2022: Pacemaker interrogation - 1 AMS event with atrial fibrillation lasting for 1 hour 57 minutes as well as 1 NSVT episode   Started on metoprolol XL 12.5 daily, eliquis  He only took metoprolol 1 time & stopped due to concerns about dizziness  5/2023: device check - no further Afib, normal function  3/2024 device check - AP 54%,  67%,  ms + 160 ms (VIP), no high rate episodes  Impression  No recent Afib, normal device function  Plan  Continue Eliquis 5 mg twice daily  Will consider addition of beta-blockers if BP remains reasonable after addition of other CM meds    Hyperlipidemia, Obesity - Body mass index is 31.14 kg/m².     Reasonably controlled, LDL 76  Check follow-up lipid panel  Continue simvastatin 40 mg daily for now, but he may benefit from switching to rosuvastatin 20 mg daily if LDL remains > 70  Weight loss recommended     ECG today -  Results for orders placed or performed in visit on 04/26/24   POCT ECG    Impression    Atrial sensed, ventricular paced rhythm with occasional PVCs  Baseline  artifact        Orders Placed This Encounter   Procedures   • B-Type Natriuretic Peptide(BNP)   • POCT ECG   • Echo complete w/ contrast if indicated       Return in about 6 weeks (around 6/7/2024), or if symptoms worsen or fail to improve.    History of Present Illness   79 y.o.  gentleman comes in as a new patient for consultation regarding ongoing symptoms of shortness of breath with exertion.   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  He is constantly back and forth between here and [place], and all of his cardiac care so far has been with a cardiologist (Dr.Nathan Prasad) in [place], [place].    He reports complains of shortness of breath with exertion, which has been gradually worsening over the past year.  He believes it is related to his weight gain and inactivity recently, but  It got worse to a point that he was short of breath with even walking short distances, and as a result he could not go to a trade show recently.  No complains of chest pain, nausea or diaphoresis.  Due to worsening symptoms, he wanted to get evaluated further and as a result is here for evaluation.      No known prior history of CAD.  No family history of early CAD.  He does not smoke.    He reports seeing the cardiologist in [place] due to irregular heartbeats, and cardiac monitors in the past.  On one of the cardiac monitor, he was noted to have a 3.5 second pause, as a result of which he was I must to get urgently admitted for a pacemaker in 2018.    Interval history - 7/12/2021   he comes back for follow-up after about 2 months.  He completed his testing, and is here to discuss results.  He continues to report shortness of breath with moderate to severe exertion, but is stable or slightly better.  He reports that he went to a car show over the weekend, and had to walk for miles throughout does days, and did not have any major problems with it.  He had painted his home  over the prior weekend, and reports not having some coughing after this, which is getting better as well.  No complains of orthopnea or PND.  No complains of chest pain.    Interval history - 1/24/2022  He comes back for follow-up after about 6 months.  He has been doing well overall and has not had any major complains of chest pain, shortness of breath or any persistent palpitations.  He continues to remain active although does not exercise routinely.  He states that he goes to car shows frequently, and has to walk long distances during this and does not report any problems with it.    Interval history - 11/3/2022  He comes back for follow-up after about 10 months.  He has overall been doing well and has not had any symptoms of chest pain, shortness of breath, palpitations.  He does report having had 1 episode of dizziness about 2-3 weeks ago, which was associated with some nausea and vomiting.  It self resolved and he did not seek medical attention.  Over the last 2 weeks he has not had any further recurrences of this.  No complains of chest pain, palpitations associated with that episode.    Interval history - 3/24/2023  He comes back for follow-up after about 5 months.  He now reports having increased symptoms of chest pains/pressure, which has been ongoing over the past 1 month.  He states that it initially happened while he was trying to do some physical work .  He stopped and rested and with that it resolved.  But it continued to occur with similar exertion and as a result he has been trying to limit his activity over the past few weeks.  No complaints of rest pain.  No nausea vomiting or diaphoresis.    Interval history - 5/19/2023  He comes back for follow-up after about 2 months.  In the interim he completed the left heart cath, which showed minimal luminal irregularities, and no intervention was necessary.  No complaints of palpitations, dizziness or lightheadedness.  He had questions about disability  parking. I reviewed the indications for him with him today, and he is able to walk 200+ ft without stopping, and would not qualify for it from cardiac standpoint.    Interval history - 4/26/2024  He returns for follow-up with me after almost 1 year.  In the interim, he had traveled to [place] and saw his cardiologist there as well.  He had continued recurrent symptoms of chest pressure and as a result he eventually had a repeat cardiac catheterization in [place] in December 2023.      Historical Information   Past Medical History:   Diagnosis Date   • Bradycardia     permanent pacemaker   • Colon polyp    • GERD (gastroesophageal reflux disease)    • Hearing aid worn    • High cholesterol    • Hypertension      Past Surgical History:   Procedure Laterality Date   • CARDIAC CATHETERIZATION Left 4/5/2023    Procedure: Cardiac Left Heart Cath;  Surgeon: Alexis Brower MD;  [facility];  Service: Cardiology   • CARDIAC PACEMAKER PLACEMENT  2017    sees cardiologist in [place]- last visit was 5/2020 and has a monitor to check pacemaker   • CARDIAC PACEMAKER PLACEMENT     • COLONOSCOPY     • CYSTOSCOPY  2018   • HERNIA REPAIR     • IA RPR RECRT INGUINAL HERNIA ANY AGE REDUCIBLE Left 12/11/2020    Procedure: REPAIR HERNIA INGUINAL;  Surgeon: Brian Gama MD;  Location: [facility];  Service: General     Family History   Problem Relation Age of Onset   • Cancer Mother    • Colon cancer Mother    • Breast cancer Sister 82     Current Outpatient Medications on File Prior to Visit   Medication Sig Dispense Refill   • apixaban (Eliquis) 5 mg TAKE ONE TABLET BY MOUTH TWO TIMES A  tablet 1   • CALCIUM PO Take by mouth     • Ferrous Sulfate (IRON PO) Take by mouth     • multivitamin (THERAGRAN) TABS Take 1 tablet by mouth daily     • omeprazole (PriLOSEC) 40 MG capsule TAKE 1 CAPSULE (40 MG TOTAL) BY MOUTH DAILY 90 capsule 1   • simvastatin (ZOCOR) 40 mg tablet TAKE ONE TABLET (40 MG TOTAL) BY MOUTH DAILY  AT BEDTIME 90 tablet 1   • vitamin B-12 (CYANOCOBALAMIN) 100 MCG TABS Take 50 mcg by mouth daily     • fluticasone (FLONASE) 50 mcg/act nasal spray INSTILL ONE SPRAY INTO EACH NOSTRIL DAILY FOR 14 DAYS 16 g 1   • Glutamine 500 MG CAPS Take by mouth every morning (Patient not taking: Reported on 5/19/2023)     • nitroglycerin (NITROSTAT) 0.4 mg SL tablet PLACE ONE TABLET (0.4 MG TOTAL) UNDER THE TONGUE EVERY FIVE MINUTES AS NEEDED FOR CHEST PAIN 25 tablet 1     No current facility-administered medications on file prior to visit.     Allergies   Allergen Reactions   • Other Other (See Comments)     WALNUT     Social History     Socioeconomic History   • Marital status:      Spouse name: None   • Number of children: None   • Years of education: None   • Highest education level: None   Occupational History   • None   Tobacco Use   • Smoking status: Never   • Smokeless tobacco: Never   Vaping Use   • Vaping status: Never Used   Substance and Sexual Activity   • Alcohol use: Yes     Comment: occ   • Drug use: Never   • Sexual activity: Not Currently   Other Topics Concern   • None   Social History Narrative   • None     Social Determinants of Health     Financial Resource Strain: Low Risk  (7/6/2023)    Received from [employer]    Overall Financial Resource Strain (CARDIA)    • Difficulty of Paying Living Expenses: Not hard at all   Food Insecurity: Unknown (7/6/2023)    Received from [facility]    Food Insecurity    • Within the past 12 months, you worried that your food would run out before you got the money to buy more.: 1    • Food Insecurity - Inability Most Recent Result: Not on file   Transportation Needs: No Transportation Needs (7/9/2023)    Received from [employer]    Transportation Needs    • In the past 12 months, has lack of transportation kept you from medical appointments or from getting medications?: 2    • In the past 12 months, has lack of transportation kept you from meetings, work, or  "from getting things needed for daily living?: 2   Physical Activity: Not on file   Stress: No Stress Concern Present (7/6/2023)    Received from [facility]    [employer] [place] - Occupational Stress Questionnaire    • Feeling of Stress : Only a little   Social Connections: Not on file   Intimate Partner Violence: Not At Risk (8/17/2023)    Received from [facility]    • Threatened: Not on file    • Insulted: Not on file    • Physically Hurt : Not on file    • Scream: Not on file   Housing Stability: At Risk (8/17/2023)    Received from [facility]    • Living Situation: Not on file    • Housing Problems: Not on file        Review of Systems   All other systems reviewed and are negative.      Vitals:  Vitals:    04/26/24 0951   BP: 136/80   BP Location: Right arm   Patient Position: Sitting   Cuff Size: Large   Pulse: 59   SpO2: 98%   Weight: 107 kg (236 lb)   Height: 6' 1\" (1.854 m)     BMI - Body mass index is 31.14 kg/m².  Wt Readings from Last 7 Encounters:   04/26/24 107 kg (236 lb)   04/22/24 104 kg (230 lb)   05/19/23 107 kg (235 lb)   04/05/23 107 kg (235 lb)   04/04/23 107 kg (235 lb)   03/24/23 107 kg (235 lb)   02/28/23 107 kg (235 lb)       Physical Exam  Vitals and nursing note reviewed.   Constitutional:       General: He is not in acute distress.     Appearance: Normal appearance. He is well-developed. He is not ill-appearing or diaphoretic.   HENT:      Head: Normocephalic and atraumatic.      Nose: No congestion.   Eyes:      General: No scleral icterus.     Conjunctiva/sclera: Conjunctivae normal.   Neck:      Vascular: No carotid bruit or JVD.   Cardiovascular:      Rate and Rhythm: Normal rate and regular rhythm.      Heart sounds: Normal heart sounds. No murmur heard.     No friction rub. No gallop.      Comments: Right upper chest wall cardiac device noted in-situ. No swelling or tenderness surrounding it.  Pulmonary:      Effort: Pulmonary " "effort is normal. No respiratory distress.      Breath sounds: Normal breath sounds. No wheezing or rales.   Chest:      Chest wall: No tenderness.   Abdominal:      General: There is no distension.      Palpations: Abdomen is soft.      Tenderness: There is no abdominal tenderness.   Musculoskeletal:         General: No swelling, tenderness or deformity.      Cervical back: Neck supple. No muscular tenderness.      Right lower leg: No edema.      Left lower leg: No edema.   Skin:     General: Skin is warm.   Neurological:      General: No focal deficit present.      Mental Status: He is alert and oriented to person, place, and time. Mental status is at baseline.   Psychiatric:         Mood and Affect: Mood normal.         Behavior: Behavior normal.         Thought Content: Thought content normal.       Labs:  CBC:   Lab Results   Component Value Date    WBC 6.17 04/05/2023    RBC 4.34 04/05/2023    HGB 13.1 04/05/2023    HCT 40.4 04/05/2023    MCV 93 04/05/2023     04/05/2023    RDW 13.2 04/05/2023       CMP:   Lab Results   Component Value Date    K 4.2 04/05/2023     04/05/2023    CO2 27 04/05/2023    BUN 16 04/05/2023    CREATININE 0.88 04/05/2023    EGFR 83 04/05/2023    CALCIUM 9.0 04/05/2023    AST 21 03/28/2023    ALT 27 03/28/2023    ALKPHOS 63 03/28/2023       Magnesium:  No results found for: \"MG\"    Lipid Profile:   Lab Results   Component Value Date    HDL 62 04/05/2023    TRIG 114 04/05/2023    LDLCALC 76 04/05/2023       Thyroid Studies:   Lab Results   Component Value Date    LXR2EGUBCURW 2.610 01/16/2023       No components found for: \"HGA1C\"    Lab Results   Component Value Date    INR 0.96 04/05/2023   5    Imaging: No results found.    Cardiac testing:   No results found for this or any previous visit.  No results found for this or any previous visit.  No results found for this or any previous visit.  No results found for this or any previous visit.    "

## 2024-04-27 LAB
ANION GAP SERPL CALCULATED.3IONS-SCNC: 8 MMOL/L (ref 4–13)
BUN SERPL-MCNC: 13 MG/DL (ref 5–25)
CALCIUM SERPL-MCNC: 9 MG/DL (ref 8.4–10.2)
CHLORIDE SERPL-SCNC: 102 MMOL/L (ref 96–108)
CO2 SERPL-SCNC: 27 MMOL/L (ref 21–32)
CREAT SERPL-MCNC: 0.86 MG/DL (ref 0.6–1.3)
GFR SERPL CREATININE-BSD FRML MDRD: 83 ML/MIN/1.73SQ M
GLUCOSE P FAST SERPL-MCNC: 83 MG/DL (ref 65–99)
POTASSIUM SERPL-SCNC: 4.2 MMOL/L (ref 3.5–5.3)
SODIUM SERPL-SCNC: 137 MMOL/L (ref 135–147)

## 2024-04-29 ENCOUNTER — HOSPITAL ENCOUNTER (OUTPATIENT)
Dept: NON INVASIVE DIAGNOSTICS | Facility: CLINIC | Age: 79
Discharge: HOME | End: 2024-04-29
Payer: MEDICARE

## 2024-04-29 VITALS
DIASTOLIC BLOOD PRESSURE: 80 MMHG | BODY MASS INDEX: 31.28 KG/M2 | SYSTOLIC BLOOD PRESSURE: 136 MMHG | HEIGHT: 73 IN | HEART RATE: 59 BPM | WEIGHT: 236 LBS

## 2024-04-29 DIAGNOSIS — I42.8 NON-ISCHEMIC CARDIOMYOPATHY (HCC): ICD-10-CM

## 2024-04-29 DIAGNOSIS — Z01.810 PREOP CARDIOVASCULAR EXAM: ICD-10-CM

## 2024-04-29 DIAGNOSIS — Z95.0 CARDIAC PACEMAKER IN SITU: ICD-10-CM

## 2024-04-29 DIAGNOSIS — R07.89 CHEST PRESSURE: ICD-10-CM

## 2024-04-29 LAB
AORTIC ROOT: 4.1 CM
AORTIC VALVE ANNULUS: 2.7 CM
APICAL FOUR CHAMBER EJECTION FRACTION: 66 %
ASCENDING AORTA: 3.8 CM
BSA FOR ECHO PROCEDURE: 2.31 M2
E WAVE DECELERATION TIME: 142 MS
E/A RATIO: 66
FRACTIONAL SHORTENING: 20 (ref 28–44)
INTERVENTRICULAR SEPTUM IN DIASTOLE (PARASTERNAL SHORT AXIS VIEW): 1.6 CM
INTERVENTRICULAR SEPTUM: 1.6 CM (ref 0.6–1.1)
LAAS-AP2: 19.1 CM2
LAAS-AP4: 18.2 CM2
LEFT ATRIUM SIZE: 4.3 CM
LEFT ATRIUM VOLUME (MOD BIPLANE): 52 ML
LEFT ATRIUM VOLUME INDEX (MOD BIPLANE): 22.5 ML/M2
LEFT INTERNAL DIMENSION IN SYSTOLE: 4.8 CM (ref 2.1–4)
LEFT VENTRICLE DIASTOLIC VOLUME (MOD BIPLANE): 99 ML
LEFT VENTRICLE DIASTOLIC VOLUME INDEX (MOD BIPLANE): 42.9 ML/M2
LEFT VENTRICLE SYSTOLIC VOLUME (MOD BIPLANE): 32 ML
LEFT VENTRICLE SYSTOLIC VOLUME INDEX (MOD BIPLANE): 13.9 ML/M2
LEFT VENTRICULAR INTERNAL DIMENSION IN DIASTOLE: 6 CM (ref 3.5–6)
LEFT VENTRICULAR POSTERIOR WALL IN END DIASTOLE: 1.7 CM
LEFT VENTRICULAR STROKE VOLUME: 75 ML
LV EF: 67 %
LVSV (TEICH): 75 ML
MV E'TISSUE VEL-LAT: 10 CM/S
MV E'TISSUE VEL-SEP: 7 CM/S
MV PEAK A VEL: 0.01 M/S
MV PEAK E VEL: 66 CM/S
MV STENOSIS PRESSURE HALF TIME: 41 MS
MV VALVE AREA P 1/2 METHOD: 5.37
RIGHT ATRIUM AREA SYSTOLE A4C: 18 CM2
RIGHT VENTRICLE ID DIMENSION: 4.2 CM
SINOTUBULAR JUNCTION: 3.1 CM
SL CV LEFT ATRIUM LENGTH A2C: 5.4 CM
SL CV LV EF: 45
SL CV PED ECHO LEFT VENTRICLE DIASTOLIC VOLUME (MOD BIPLANE) 2D: 181 ML
SL CV PED ECHO LEFT VENTRICLE SYSTOLIC VOLUME (MOD BIPLANE) 2D: 105 ML
SL CV SINUS OF VALSALVA 2D: 3.9 CM
STJ: 3.1 CM
TR MAX PG: 15 MMHG
TR PEAK VELOCITY: 2 M/S
TRICUSPID ANNULAR PLANE SYSTOLIC EXCURSION: 1.7 CM
TRICUSPID VALVE PEAK REGURGITATION VELOCITY: 1.96 M/S

## 2024-04-29 PROCEDURE — 93306 TTE W/DOPPLER COMPLETE: CPT

## 2024-04-29 PROCEDURE — 93306 TTE W/DOPPLER COMPLETE: CPT | Performed by: INTERNAL MEDICINE

## 2024-04-30 ENCOUNTER — TELEPHONE (OUTPATIENT)
Dept: CARDIOLOGY CLINIC | Facility: CLINIC | Age: 79
End: 2024-04-30

## 2024-04-30 ENCOUNTER — TELEPHONE (OUTPATIENT)
Age: 79
End: 2024-04-30

## 2024-04-30 RX ORDER — ROSUVASTATIN CALCIUM 20 MG/1
20 TABLET, COATED ORAL DAILY
COMMUNITY

## 2024-04-30 RX ORDER — RABEPRAZOLE SODIUM 20 MG/1
20 TABLET, DELAYED RELEASE ORAL DAILY
COMMUNITY
End: 2024-05-15 | Stop reason: SDUPTHER

## 2024-04-30 RX ORDER — ASPIRIN 81 MG/1
81 TABLET, CHEWABLE ORAL DAILY
COMMUNITY

## 2024-04-30 NOTE — TELEPHONE ENCOUNTER
Patient Prince [phone] contacting office after office visit with Dr. Mejia.    Patient was prescribed Entresto at last office visit.    Patient started taking the medication on Sunday, on Monday patient was experiencing chest pressure and stopped taking the Entresto.     Patient is inquiring if this is a side effect.     Also, patient is inquiring if he should hold his aspirin prior to hernia surgery.

## 2024-04-30 NOTE — TELEPHONE ENCOUNTER
Patient saw Dr Mejia on 4/26 . Dr Mejia is out of the office today.    Patient has had a history of chest tightness for a year. Echo done yesterday.     Dr Mejia prescribed Entresto and Imdur at the visit.    Patient said he took both meds for two days and felt worse chest pressure so he stopped both meds.  Last doses of both meds were yesterday morning and he feels fine since.  No further chest pressure since stopping meds.     He has not been monitoring his BP at home.     He said if he is instructed to continue either med by the doctor he will do so.      Please advise if recommendations.

## 2024-04-30 NOTE — RESULT ENCOUNTER NOTE
Missed pt call so called him back. He wanted to make sure he was okay for the surgery and I assured him that you gave him the okay to proceed. Pt understood. No more questions at this time.

## 2024-04-30 NOTE — TELEPHONE ENCOUNTER
Called patient & read him Dr Mejia's ASA hold order for prior to his scheduled hernia repair on 5/8.    Patient verbalized understanding.

## 2024-04-30 NOTE — PRE-PROCEDURE INSTRUCTIONS
Pre-Surgery Instructions:   Medication Instructions    apixaban (Eliquis) 5 mg Last dose 5-5-24 Pm per MD         aspirin 81 mg chewable tablet Per MD    CALCIUM PO Stop taking 7 days prior to surgery.    Ferrous Sulfate (IRON PO) Hold day of surgery.    fluticasone (FLONASE) 50 mcg/act nasal spray Uses PRN- OK to take day of surgery    Glutamine 500 MG CAPS Stop taking 7 days prior to surgery.    isosorbide mononitrate (IMDUR) 30 mg 24 hr tablet Take day of surgery.    multivitamin (THERAGRAN) TABS Stop taking 7 days prior to surgery.    nitroglycerin (NITROSTAT) 0.4 mg SL tablet Uses PRN- OK to take day of surgery    RABEprazole (ACIPHEX) 20 MG tablet Take day of surgery.    rosuvastatin (CRESTOR) 20 MG tablet Take night before surgery    vitamin B-12 (CYANOCOBALAMIN) 100 MCG TABS Stop taking 7 days prior to surgery.   Medication instructions for day surgery reviewed. Please use only a sip of water to take your instructed medications. Avoid all over the counter vitamins, supplements and NSAIDS for one week prior to surgery per anesthesia guidelines. Tylenol is ok to take as needed.     You will receive a call one business day prior to surgery with an arrival time and hospital directions. If your surgery is scheduled on a Monday, the hospital will be calling you on the Friday prior to your surgery. If you have not heard from anyone by 8pm, please call the hospital supervisor through the hospital  at [phone]  or [place] [phone]).    Do not eat or drink anything after midnight the night before your surgery, including candy, mints, lifesavers, or chewing gum. Do not drink alcohol 24hrs before your surgery. Try not to smoke at least 24hrs before your surgery.       Follow the pre surgery showering instructions as listed in the “My Surgical Experience Booklet” or otherwise provided by your surgeon's office. Do not use a blade to shave the surgical area 1 week before surgery. It is okay to use a clean  electric clippers up to 24 hours before surgery. Do not apply any lotions, creams, including makeup, cologne, deodorant, or perfumes after showering on the day of your surgery. Do not use dry shampoo, hair spray, hair gel, or any type of hair products.     No contact lenses, eye make-up, or artificial eyelashes. Remove nail polish, including gel polish, and any artificial, gel, or acrylic nails if possible. Remove all jewelry including rings and body piercing jewelry.     Wear causal clothing that is easy to take on and off. Consider your type of surgery.    Keep any valuables, jewelry, piercings at home. Please bring any specially ordered equipment (sling, braces) if indicated.    Arrange for a responsible person to drive you to and from the hospital on the day of your surgery. Please confirm the visitor policy for the day of your procedure when you receive your phone call with an arrival time.     Call the surgeon's office with any new illnesses, exposures, or additional questions prior to surgery.    Please reference your “My Surgical Experience Booklet” for additional information to prepare for your upcoming surgery.

## 2024-05-01 ENCOUNTER — ANESTHESIA EVENT (OUTPATIENT)
Dept: PERIOP | Facility: HOSPITAL | Age: 79
End: 2024-05-01
Payer: MEDICARE

## 2024-05-03 ENCOUNTER — TELEPHONE (OUTPATIENT)
Dept: GASTROENTEROLOGY | Facility: AMBULARY SURGERY CENTER | Age: 79
End: 2024-05-03

## 2024-05-08 ENCOUNTER — HOSPITAL ENCOUNTER (OUTPATIENT)
Facility: HOSPITAL | Age: 79
Setting detail: OUTPATIENT SURGERY
Discharge: HOME | End: 2024-05-08
Attending: SURGERY | Admitting: SURGERY
Payer: MEDICARE

## 2024-05-08 ENCOUNTER — ANESTHESIA (OUTPATIENT)
Dept: PERIOP | Facility: HOSPITAL | Age: 79
End: 2024-05-08
Payer: MEDICARE

## 2024-05-08 VITALS
OXYGEN SATURATION: 97 % | SYSTOLIC BLOOD PRESSURE: 155 MMHG | DIASTOLIC BLOOD PRESSURE: 74 MMHG | WEIGHT: 223.1 LBS | HEIGHT: 73 IN | TEMPERATURE: 97.5 F | BODY MASS INDEX: 29.57 KG/M2 | RESPIRATION RATE: 18 BRPM | HEART RATE: 61 BPM

## 2024-05-08 DIAGNOSIS — K42.9 UMBILICAL HERNIA: ICD-10-CM

## 2024-05-08 DIAGNOSIS — Z87.19 HX OF UMBILICAL HERNIA REPAIR: Primary | ICD-10-CM

## 2024-05-08 DIAGNOSIS — Z98.890 HX OF UMBILICAL HERNIA REPAIR: Primary | ICD-10-CM

## 2024-05-08 PROCEDURE — C1781 MESH (IMPLANTABLE): HCPCS | Performed by: SURGERY

## 2024-05-08 PROCEDURE — 49591 RPR AA HRN 1ST < 3 CM RDC: CPT | Performed by: SURGERY

## 2024-05-08 PROCEDURE — 49591 RPR AA HRN 1ST < 3 CM RDC: CPT | Performed by: PHYSICIAN ASSISTANT

## 2024-05-08 PROCEDURE — 88302 TISSUE EXAM BY PATHOLOGIST: CPT | Performed by: PATHOLOGY

## 2024-05-08 DEVICE — VENTRALEX ST HERNIA PATCH
Type: IMPLANTABLE DEVICE | Site: UMBILICAL | Status: FUNCTIONAL
Brand: VENTRALEX ST HERNIA PATCH

## 2024-05-08 RX ORDER — SODIUM CHLORIDE, SODIUM LACTATE, POTASSIUM CHLORIDE, CALCIUM CHLORIDE 600; 310; 30; 20 MG/100ML; MG/100ML; MG/100ML; MG/100ML
125 INJECTION, SOLUTION INTRAVENOUS CONTINUOUS
Status: DISCONTINUED | OUTPATIENT
Start: 2024-05-08 | End: 2024-05-08 | Stop reason: HOSPADM

## 2024-05-08 RX ORDER — OXYCODONE HYDROCHLORIDE 5 MG/1
5 TABLET ORAL EVERY 6 HOURS PRN
Qty: 12 TABLET | Refills: 0 | Status: SHIPPED | OUTPATIENT
Start: 2024-05-08

## 2024-05-08 RX ORDER — HEPARIN SODIUM 5000 [USP'U]/ML
5000 INJECTION, SOLUTION INTRAVENOUS; SUBCUTANEOUS ONCE
Status: COMPLETED | OUTPATIENT
Start: 2024-05-08 | End: 2024-05-08

## 2024-05-08 RX ORDER — KETOROLAC TROMETHAMINE 30 MG/ML
INJECTION, SOLUTION INTRAMUSCULAR; INTRAVENOUS AS NEEDED
Status: DISCONTINUED | OUTPATIENT
Start: 2024-05-08 | End: 2024-05-08

## 2024-05-08 RX ORDER — ONDANSETRON 2 MG/ML
4 INJECTION INTRAMUSCULAR; INTRAVENOUS ONCE AS NEEDED
Status: DISCONTINUED | OUTPATIENT
Start: 2024-05-08 | End: 2024-05-08 | Stop reason: HOSPADM

## 2024-05-08 RX ORDER — LIDOCAINE HYDROCHLORIDE 10 MG/ML
INJECTION, SOLUTION EPIDURAL; INFILTRATION; INTRACAUDAL; PERINEURAL AS NEEDED
Status: DISCONTINUED | OUTPATIENT
Start: 2024-05-08 | End: 2024-05-08

## 2024-05-08 RX ORDER — BUPIVACAINE HYDROCHLORIDE AND EPINEPHRINE 2.5; 5 MG/ML; UG/ML
INJECTION, SOLUTION INFILTRATION; PERINEURAL AS NEEDED
Status: DISCONTINUED | OUTPATIENT
Start: 2024-05-08 | End: 2024-05-08 | Stop reason: HOSPADM

## 2024-05-08 RX ORDER — HYDROMORPHONE HCL IN WATER/PF 6 MG/30 ML
0.2 PATIENT CONTROLLED ANALGESIA SYRINGE INTRAVENOUS
Status: DISCONTINUED | OUTPATIENT
Start: 2024-05-08 | End: 2024-05-08 | Stop reason: HOSPADM

## 2024-05-08 RX ORDER — SODIUM CHLORIDE, SODIUM LACTATE, POTASSIUM CHLORIDE, CALCIUM CHLORIDE 600; 310; 30; 20 MG/100ML; MG/100ML; MG/100ML; MG/100ML
100 INJECTION, SOLUTION INTRAVENOUS CONTINUOUS
Status: DISCONTINUED | OUTPATIENT
Start: 2024-05-08 | End: 2024-05-08 | Stop reason: HOSPADM

## 2024-05-08 RX ORDER — FENTANYL CITRATE 50 UG/ML
INJECTION, SOLUTION INTRAMUSCULAR; INTRAVENOUS AS NEEDED
Status: DISCONTINUED | OUTPATIENT
Start: 2024-05-08 | End: 2024-05-08

## 2024-05-08 RX ORDER — OMEPRAZOLE 40 MG/1
40 CAPSULE, DELAYED RELEASE ORAL DAILY
COMMUNITY
End: 2024-05-15 | Stop reason: ALTCHOICE

## 2024-05-08 RX ORDER — DOCUSATE SODIUM 100 MG/1
100 CAPSULE, LIQUID FILLED ORAL 2 TIMES DAILY
Qty: 14 CAPSULE | Refills: 0 | Status: SHIPPED | OUTPATIENT
Start: 2024-05-08 | End: 2024-05-15

## 2024-05-08 RX ORDER — DEXAMETHASONE SODIUM PHOSPHATE 10 MG/ML
INJECTION, SOLUTION INTRAMUSCULAR; INTRAVENOUS AS NEEDED
Status: DISCONTINUED | OUTPATIENT
Start: 2024-05-08 | End: 2024-05-08

## 2024-05-08 RX ORDER — PROPOFOL 10 MG/ML
INJECTION, EMULSION INTRAVENOUS AS NEEDED
Status: DISCONTINUED | OUTPATIENT
Start: 2024-05-08 | End: 2024-05-08

## 2024-05-08 RX ORDER — MAGNESIUM HYDROXIDE 1200 MG/15ML
LIQUID ORAL AS NEEDED
Status: DISCONTINUED | OUTPATIENT
Start: 2024-05-08 | End: 2024-05-08 | Stop reason: HOSPADM

## 2024-05-08 RX ORDER — ONDANSETRON 2 MG/ML
INJECTION INTRAMUSCULAR; INTRAVENOUS AS NEEDED
Status: DISCONTINUED | OUTPATIENT
Start: 2024-05-08 | End: 2024-05-08

## 2024-05-08 RX ORDER — CEFAZOLIN SODIUM 2 G/50ML
2000 SOLUTION INTRAVENOUS ONCE
Status: COMPLETED | OUTPATIENT
Start: 2024-05-08 | End: 2024-05-08

## 2024-05-08 RX ORDER — LIDOCAINE HYDROCHLORIDE 10 MG/ML
0.5 INJECTION, SOLUTION EPIDURAL; INFILTRATION; INTRACAUDAL; PERINEURAL ONCE AS NEEDED
Status: DISCONTINUED | OUTPATIENT
Start: 2024-05-08 | End: 2024-05-08 | Stop reason: HOSPADM

## 2024-05-08 RX ADMIN — PROPOFOL 130 MG: 10 INJECTION, EMULSION INTRAVENOUS at 07:35

## 2024-05-08 RX ADMIN — DEXAMETHASONE SODIUM PHOSPHATE 10 MG: 10 INJECTION, SOLUTION INTRAMUSCULAR; INTRAVENOUS at 07:35

## 2024-05-08 RX ADMIN — FENTANYL CITRATE 25 MCG: 50 INJECTION INTRAMUSCULAR; INTRAVENOUS at 07:35

## 2024-05-08 RX ADMIN — ONDANSETRON 4 MG: 2 INJECTION INTRAMUSCULAR; INTRAVENOUS at 07:35

## 2024-05-08 RX ADMIN — FENTANYL CITRATE 25 MCG: 50 INJECTION INTRAMUSCULAR; INTRAVENOUS at 07:58

## 2024-05-08 RX ADMIN — KETOROLAC TROMETHAMINE 15 MG: 30 INJECTION INTRAMUSCULAR; INTRAVENOUS at 08:40

## 2024-05-08 RX ADMIN — PHENYLEPHRINE HYDROCHLORIDE 100 MCG: 10 INJECTION INTRAVENOUS at 07:49

## 2024-05-08 RX ADMIN — HEPARIN SODIUM 5000 UNITS: 5000 INJECTION, SOLUTION INTRAVENOUS; SUBCUTANEOUS at 07:10

## 2024-05-08 RX ADMIN — HYDROMORPHONE HYDROCHLORIDE 0.2 MG: 0.2 INJECTION, SOLUTION INTRAMUSCULAR; INTRAVENOUS; SUBCUTANEOUS at 08:57

## 2024-05-08 RX ADMIN — SODIUM CHLORIDE, SODIUM LACTATE, POTASSIUM CHLORIDE, AND CALCIUM CHLORIDE 125 ML/HR: .6; .31; .03; .02 INJECTION, SOLUTION INTRAVENOUS at 07:06

## 2024-05-08 RX ADMIN — LIDOCAINE HYDROCHLORIDE 50 MG: 10 INJECTION, SOLUTION EPIDURAL; INFILTRATION; INTRACAUDAL; PERINEURAL at 07:35

## 2024-05-08 RX ADMIN — CEFAZOLIN SODIUM 2000 MG: 2 SOLUTION INTRAVENOUS at 07:28

## 2024-05-08 NOTE — ANESTHESIA POSTPROCEDURE EVALUATION
Post-Op Assessment Note    CV Status:  Stable  Pain Score: 0    Pain management: adequate       Mental Status:  Alert and awake   Hydration Status:  Euvolemic   PONV Controlled:  Controlled   Airway Patency:  Patent  Two or more mitigation strategies used for obstructive sleep apnea   Post Op Vitals Reviewed: Yes    No anethesia notable event occurred.    Staff: Anesthesiologist, CRNA               BP   131/78   Temp   98.3   Pulse  70   Resp   15   SpO2   100

## 2024-05-08 NOTE — INTERVAL H&P NOTE
H&P reviewed. After examining the patient I find no changes in the patients condition since the H&P had been written.    Vitals:    05/08/24 0700   BP: 168/81   Pulse: 63   Resp: 18   Temp: 97.6 °F (36.4 °C)   SpO2: 97%

## 2024-05-08 NOTE — OP NOTE
OPERATIVE REPORT  PATIENT NAME: Prince Branch    :  1945  MRN: [id number]  Pt Location: [facility] OR ROOM 03    SURGERY DATE: 2024    Surgeons and Role:     * Brian Gama MD - Primary     * Katie Villeda PA-C - Assisting    Preop Diagnosis:  Umbilical hernia [K42.9]  Fascial defect 2.5 cm    Post-Op Diagnosis Codes:     * Umbilical hernia [K42.9]    Procedure(s):  REPAIR OF UMBILICAL HERNIA with small Ventralex mesh    Specimen(s):  ID Type Source Tests Collected by Time Destination   1 :  Tissue Hernia Sac, Umbilical TISSUE EXAM Brian Gama MD 2024 0801        Estimated Blood Loss:   Minimal    Drains:  * No LDAs found *    Anesthesia Type:   General anesthesia via laryngeal airway and local anesthesia used 30 mL of 0.25% Marcaine with 1 in 200,000 epinephrine    Operative Indications:  Umbilical hernia [K42.9]  This is a 79-year-old male who was seen in the office with a symptomatic umbilical hernia.  The hernia has been present for a long time.  The hernia is reducible.    Operative Findings:  The hernia contained properitoneal fat and omentum.  The fascial defect was 2.5 cm.    Complications:   None    Procedure and Technique:  Patient was brought to the operating room suite.  He was identified by me.  He was laid supine on the operating table.  General anesthesia was given via laryngeal airway.  Patient was given preoperative doses of Cefazolin and subcutaneous Heparin.    Abdominal wall was cleaned with ChloraPrep and patient was draped.  We used an iodine impregnated stare draped.  Local infiltration anesthesia was given.  An inferior umbilicus skin crease incision was made dividing the skin and the subcutaneous tissues.  The umbilical hernia sac was identified and circumferentially dissected out.  The sac was then opened and dissected down to the fascia.  The umbilical hernia sac and the properitoneal fat were excised and sent for pathological examination.  The hernia  contents were pushed back into the abdominal cavity.  The fascial edges were freshened.  Hemostasis was obtained.  We used a small Ventralex mesh which was placed through the defect.  The defect was closed incorporating the tapes of the mesh with interrupted 2-0 Prolene sutures.  Excess tapes were removed.  Subcutaneous tissues were closed with interrupted 2-0 and 3-0 Vicryl.  Skin was closed with continuous subcuticular 4-0 Monocryl.  A pressure dressing with gauze and Tegaderm was applied.    Patient tolerated procedure well.    I was present for the entire procedure., A qualified resident physician was not available. A physician assistant was required during the procedure for retraction, tissue handling, dissection and suturing.    Patient Disposition:  PACU         SIGNATURE:  Brian Gama MD  DATE: May 8, 2024  TIME: 8:36 AM

## 2024-05-08 NOTE — DISCHARGE INSTR - AVS FIRST PAGE
-Remove dressing in 5 days, on Monday May 13  -Restart Eliquis tomorrow Thursday May 9  -Script for pain medication sent to pharmacy. Take as needed if pain is not controlled with over the counter Tylenol or Advil/Motrin. Take with food. Do no drive while taking.   -A script for colace, a stool softener was also sent. Take while taking oxycodone to prevent constipation.   -Schedule an appointment to see Dr. Gama in 3 weeks.       DISCHARGE INSTRUCTIONS:  FOLLOW UP APPOINTMENT: Following discharge from the hospital call the office in 1-2 days to set up a post operative appointment to be seen in 2 weeks by Dr. Gama.    AFTER YOU LEAVE: Following discharge from the hospital, you may have some questions about your procedure, your activities or your general condition.  These instructions may answer some of your questions and help you adjust during the first few days following your operation.  You can expect to be sore and tender mostly around the incisions. This pain can last approximally 5 days and should gradually improve daily.          INCISION SITES:  - You may apply ice to the incisions to help with pain. Avoid heat as this may make skin glue tacky.  - It is normal to have some bruising, swelling or mild discoloration around the incision.  If increasing redness, pain, or thick green/yellow discharge develops, call our office immediately.   -Do not apply any creams, lotions, or ointments (including neosporin).  -The incisions are closed with dissolvable sutures underneath the skin and a surgical adhesive glue overlying the skin.  - Do not pick at the adhesive. It will naturally wear off with time.  -Do not place a heat to the incisions as this can make the glue tacky.    WOUND CARE:  -If you have steri-strip, leave them on, allow to fall off naturally.    -Avoid tight adhering, irritative clothing.  -You may gently shower, let water and soap run down and pat dry; no scrubbing the incision sites.   -No submersion  in water (baths, hot tubs, or swimming) until cleared at follow up appointment.    PAIN CONTROL/MEDICATIONS:  -Recommend taking over the counter pain medication such as acetaminophen (Tylenol), Aleve, OR ibuprofen (Motrin/Advil) first; read and follow labels. You may alternate Tylenol and Ibuprofen every 3 hours.  -Only use prescribed pain medications as needed and try to taper down use overtime. Do not drive or operate heavy machinery while on prescription pain medications. Do not take with alcohol.  - If you were given a prescription for Percocet, Norco, or Vicodin for pain be sure to eat prior to taking as these medications as they may cause nausea and vomiting on an empty stomach.    -DO NOT take Tylenol  (acetaminophen) with prescribed pain medication for a fever or for further pain control as these medications already contain Tylenol in them. Take one or the other.  Do not exceed more than 4000 mg of acetaminophen in 24 hours or 3000 mg if you have liver disease.   -You may apply ice at the incision site as needed for 20 minutes on/off to decrease pain/swelling the first few days.   - If you were given an antibiotic take it until it is finished.    DIET/LIFE HABITS:  -Advance diet as tolerated. Start with bland foods. Once tolerating normal diet, include fiber-rich foods such as fruits and green leafy vegetables to help with bowel movements.  -Stay hydrated. Drink plenty of non-caffienated, non-alcoholic beverages such as water, juices, popsicles, etc.  -Refrain from alcohol consumption the first few weeks as this can impair wound healing and increase the risk of unwanted bleeding.   -No smoking at least 2 weeks post surgery, this can also delay wound healing. Smoking cessation is encouraged to improve your overall health. We can provide you with options to facilitate cessation.  -If you are having constipation, ensure you are eating a high fiber diet, stay active, and if requiring more, you may take over the  counter stool softners as needed.    ACTIVITY/RESTRICTIONS:  -No strenuous exercise and no heavy lifting, pulling, or pushing >10-15 lbs x 4 weeks or until cleared by surgeon.  -The evening following the procedure you should rest as much as possible, sitting, lying or reclining.  You should be sure someone remains with you until the next morning.  Gradually increase your activity daily. Walking 3-4 times daily is good and stairs are ok.  Listen to your body.  If you start to get tired or sore then rest.   -No driving for 5 days or while taking narcotics for pain. You may begin to drive again once you can get in and out of a car comfortably and once off prescribed pain medication x 24 hours.       RETURN TO WORK:  -You may return to work or other activities as soon as your pain is controlled and you feel comfortable. For many people, this is 5 to 7 days after surgery. If your job requires heavy lifting you will need to be on light duty for 2-3 weeks.     CALL THE OFFICE IF/RETURN TO ED:  -Fevers/chills with uncontrolled temperature > 100.4 F.  -Increasing severe pain uncontrolled with pain medicine.  -Incision site is having thick, yellow drainage, increasing redness, is warm to the touch, or becoming increasingly tender.  -Any changes in overall sharan such as: nausea/vomitting, fevers/chills, diarrhea/constipation, inability to urinate, chest pains, palpations, trouble breathing, coughing, etc.

## 2024-05-08 NOTE — ANESTHESIA PREPROCEDURE EVALUATION
Procedure:  REPAIR OF UMBILICAL HERIA (Abdomen)    LMA vs OETT for muscle relaxation    EF 45%, DD1, mild MR/TR    PACER w/ A/V pacing    Relevant Problems   GI/HEPATIC   (+) Gastroesophageal reflux disease      Cardiovascular/Peripheral Vascular   (+) Cardiomyopathy, unspecified type (HCC)        Physical Exam    Airway    Mallampati score: III  TM Distance: >3 FB  Neck ROM: full     Dental   No notable dental hx     Cardiovascular  Rhythm: regular, Rate: normal, Cardiovascular exam normal    Pulmonary  Pulmonary exam normal Breath sounds clear to auscultation    Other Findings        Anesthesia Plan  ASA Score- 3     Anesthesia Type- general with ASA Monitors.         Additional Monitors:     Airway Plan: ETT.    Comment: Risks of general anesthesia discussed including likely possibility of PONV and sore throat, as well as the rare possibilities of aspiration, dental/oropharyngeal/ocular injuries, or grave/life threatening anesthetic and surgical emergencies..       Plan Factors-Exercise tolerance (METS): >4 METS.    Chart reviewed.    Patient summary reviewed.      Patient instructed to abstain from smoking on day of procedure. Patient did not smoke on day of surgery.            Induction- intravenous.    Postoperative Plan- Plan for postoperative opioid use. Planned trial extubation    Informed Consent- Anesthetic plan and risks discussed with patient.  I personally reviewed this patient with the CRNA. Discussed and agreed on the Anesthesia Plan with the CRNA..

## 2024-05-13 PROCEDURE — 88302 TISSUE EXAM BY PATHOLOGIST: CPT | Performed by: PATHOLOGY

## 2024-05-14 ENCOUNTER — TELEPHONE (OUTPATIENT)
Dept: GASTROENTEROLOGY | Facility: AMBULARY SURGERY CENTER | Age: 79
End: 2024-05-14

## 2024-05-15 ENCOUNTER — OFFICE VISIT (OUTPATIENT)
Dept: GASTROENTEROLOGY | Facility: AMBULARY SURGERY CENTER | Age: 79
End: 2024-05-15
Payer: MEDICARE

## 2024-05-15 VITALS
WEIGHT: 230 LBS | SYSTOLIC BLOOD PRESSURE: 130 MMHG | OXYGEN SATURATION: 98 % | DIASTOLIC BLOOD PRESSURE: 82 MMHG | BODY MASS INDEX: 30.48 KG/M2 | HEIGHT: 73 IN | HEART RATE: 62 BPM

## 2024-05-15 DIAGNOSIS — K21.9 GASTROESOPHAGEAL REFLUX DISEASE WITHOUT ESOPHAGITIS: Primary | ICD-10-CM

## 2024-05-15 PROCEDURE — 99214 OFFICE O/P EST MOD 30 MIN: CPT | Performed by: INTERNAL MEDICINE

## 2024-05-15 RX ORDER — RABEPRAZOLE SODIUM 20 MG/1
20 TABLET, DELAYED RELEASE ORAL 2 TIMES DAILY
Qty: 60 TABLET | Refills: 11 | Status: SHIPPED | OUTPATIENT
Start: 2024-05-15 | End: 2024-05-17 | Stop reason: SDUPTHER

## 2024-05-15 NOTE — PROGRESS NOTES
Follow-up Note -  [facility]  Prince Branch 1945 male         ASSESSMENT & PLAN:    Gastroesophageal reflux disease  Atypical chest pressure appears to be secondary to acid reflux and probable esophagitis since the cardiac workup was negative.  Patient was noted to have 4 cm hiatal hernia on the previous upper endoscopy.    -Patient was explained about the lifestyle and dietary modifications.  Advised to avoid fatty foods, chocolates, caffeine, alcohol and any other triggering foods.  Avoid eating for at least 3 hours before going to bed.    -Increase rabeprazole to 20 mg twice daily    -Advised patient to call for any worsening or if no improvement and we can consider upper endoscopy at that time    -Follow-up office visit in a couple of months.      Reason: GERD, chest pressure-Dr. Sagastume's patient    HPI:  Mr. Morrison has history of GERD, large hiatal hernia and had last upper endoscopy along with colonoscopy in April 2022 by Dr. Sagastume.  He takes rabeprazole 20 mg daily and reports having episodes of chest pressure for about a year.  He was evaluated by cardiology and had negative workup.  Good appetite, no recent weight loss.  Regular bowel movements and denies any blood or mucus in the stool.  No abdominal pain, nausea or vomiting.    Chaperon: Ms. Duvall    REVIEW OF SYSTEMS: Review of Systems   Constitutional:  Negative for activity change, appetite change, chills, diaphoresis, fatigue, fever and unexpected weight change.   HENT:  Negative for ear discharge, ear pain, facial swelling, hearing loss, nosebleeds, sore throat, tinnitus and voice change.    Eyes:  Negative for pain, discharge, redness, itching and visual disturbance.   Respiratory:  Negative for apnea, cough, chest tightness, shortness of breath and wheezing.    Cardiovascular:  Negative for chest pain and palpitations.   Gastrointestinal:         As noted in HPI   Endocrine: Negative for cold intolerance, heat intolerance  and polyuria.   Genitourinary:  Negative for difficulty urinating, dysuria, flank pain, hematuria and urgency.   Musculoskeletal:  Negative for arthralgias, back pain, gait problem, joint swelling and myalgias.   Skin:  Negative for rash and wound.   Neurological:  Negative for dizziness, tremors, seizures, speech difficulty, light-headedness, numbness and headaches.   Hematological:  Negative for adenopathy. Does not bruise/bleed easily.   Psychiatric/Behavioral:  Negative for agitation, behavioral problems and confusion. The patient is not nervous/anxious.         Past Medical History:   Diagnosis Date    Bradycardia     permanent pacemaker    Chest pain     Colon polyp     GERD (gastroesophageal reflux disease)     Hearing aid worn     High cholesterol     Hypertension       Past Surgical History:   Procedure Laterality Date    CARDIAC CATHETERIZATION Left 4/5/2023    Procedure: Cardiac Left Heart Cath;  Surgeon: Alexis Brower MD;  [facility];  Service: Cardiology    CARDIAC PACEMAKER PLACEMENT  2017    sees cardiologist in [place]- last visit was 5/2020 and has a monitor to check pacemaker    CARDIAC PACEMAKER PLACEMENT      COLONOSCOPY      CYSTOSCOPY  2018    HERNIA REPAIR      AK RPR AA HERNIA 1ST < 3 CM REDUCIBLE N/A 5/8/2024    Procedure: REPAIR OF UMBILICAL HERIA;  Surgeon: Brian Gama MD;  Location:  MAIN OR;  Service: General    AK RPR RECRT INGUINAL HERNIA ANY AGE REDUCIBLE Left 12/11/2020    Procedure: REPAIR HERNIA INGUINAL;  Surgeon: Brian Gama MD;  Location: [facility];  Service: General     Social History     Socioeconomic History    Marital status:      Spouse name: Not on file    Number of children: Not on file    Years of education: Not on file    Highest education level: Not on file   Occupational History    Not on file   Tobacco Use    Smoking status: Never    Smokeless tobacco: Never   Vaping Use    Vaping status: Never Used   Substance and Sexual  Activity    Alcohol use: Yes     Comment: occ-weekly    Drug use: Never    Sexual activity: Not Currently   Other Topics Concern    Not on file   Social History Narrative    Not on file     Social Determinants of Health     Financial Resource Strain: Low Risk  (7/6/2023)    [facility]    Overall Financial Resource Strain (CARDIA)     Difficulty of Paying Living Expenses: Not hard at all   Food Insecurity: Unknown (7/6/2023)    Received from [facility]    Food Insecurity     Within the past 12 months, you worried that your food would run out before you got the money to buy more.: 1     Food Insecurity - Inability Most Recent Result: Not on file   Transportation Needs: No Transportation Needs (7/9/2023)    Received from [facility]    Transportation Needs     In the past 12 months, has lack of transportation kept you from medical appointments or from getting medications?: 2     In the past 12 months, has lack of transportation kept you from meetings, work, or from getting things needed for daily living?: 2   Physical Activity: Not on file   Stress: No Stress Concern Present (7/6/2023)    [facility]    [employer] [place] [employer] - Occupational Stress Questionnaire     Feeling of Stress : Only a little   Social Connections: Not on file   Intimate Partner Violence: Not At Risk (8/17/2023)    Received from [facility]     Threatened: Not on file     Insulted: Not on file     Physically Hurt : Not on file     Scream: Not on file   Housing Stability: At Risk (8/17/2023)    Received from [facility]     Living Situation: Not on file     Housing Problems: Not on file     Family History   Problem Relation Age of Onset    Cancer Mother     Colon cancer Mother     Breast cancer Sister 82     Other  Current Outpatient Medications   Medication Sig Dispense Refill    apixaban (Eliquis) 5 mg TAKE ONE TABLET BY MOUTH TWO TIMES A  tablet 1     "aspirin 81 mg chewable tablet Chew 81 mg daily      CALCIUM PO Take by mouth      docusate sodium (COLACE) 100 mg capsule Take 1 capsule (100 mg total) by mouth 2 (two) times a day for 7 days 14 capsule 0    Ferrous Sulfate (IRON PO) Take by mouth      Glutamine 500 MG CAPS Take by mouth every morning      multivitamin (THERAGRAN) TABS Take 1 tablet by mouth daily      nitroglycerin (NITROSTAT) 0.4 mg SL tablet PLACE ONE TABLET (0.4 MG TOTAL) UNDER THE TONGUE EVERY FIVE MINUTES AS NEEDED FOR CHEST PAIN 25 tablet 1    RABEprazole (ACIPHEX) 20 MG tablet Take 1 tablet (20 mg total) by mouth 2 (two) times a day 60 tablet 11    rosuvastatin (CRESTOR) 20 MG tablet Take 20 mg by mouth daily      vitamin B-12 (CYANOCOBALAMIN) 100 MCG TABS Take 50 mcg by mouth daily      fluticasone (FLONASE) 50 mcg/act nasal spray INSTILL ONE SPRAY INTO EACH NOSTRIL DAILY FOR 14 DAYS (Patient not taking: Reported on 5/15/2024) 16 g 1    isosorbide mononitrate (IMDUR) 30 mg 24 hr tablet Take 1 tablet (30 mg total) by mouth daily (Patient not taking: Reported on 5/15/2024) 30 tablet 2    oxyCODONE (Roxicodone) 5 immediate release tablet Take 1 tablet (5 mg total) by mouth every 6 (six) hours as needed for severe pain for up to 12 doses Max Daily Amount: 20 mg (Patient not taking: Reported on 5/15/2024) 12 tablet 0    sacubitril-valsartan (Entresto) 24-26 MG TABS Take 1 tablet by mouth 2 (two) times a day (Patient not taking: Reported on 4/30/2024) 60 tablet 3     No current facility-administered medications for this visit.       Blood pressure 130/82, pulse 62, height 6' 1\" (1.854 m), weight 104 kg (230 lb), SpO2 98%.    PHYSICAL EXAM: Physical Exam  Constitutional:       Appearance: He is well-developed.   HENT:      Head: Normocephalic and atraumatic.   Eyes:      General: No scleral icterus.        Right eye: No discharge.         Left eye: No discharge.      Conjunctiva/sclera: Conjunctivae normal.      Pupils: Pupils are equal, round, " and reactive to light.   Neck:      Thyroid: No thyromegaly.      Vascular: No JVD.      Trachea: No tracheal deviation.   Cardiovascular:      Rate and Rhythm: Normal rate and regular rhythm.      Heart sounds: Normal heart sounds. No murmur heard.     No friction rub. No gallop.   Pulmonary:      Effort: Pulmonary effort is normal. No respiratory distress.      Breath sounds: Normal breath sounds. No wheezing or rales.   Chest:      Chest wall: No tenderness.   Abdominal:      General: Bowel sounds are normal. There is no distension.      Palpations: Abdomen is soft. There is no mass.      Tenderness: There is no abdominal tenderness. There is no guarding or rebound.      Hernia: No hernia is present.   Musculoskeletal:      Cervical back: Neck supple.   Lymphadenopathy:      Cervical: No cervical adenopathy.   Skin:     General: Skin is warm and dry.      Findings: No erythema or rash.   Neurological:      Mental Status: He is alert and oriented to person, place, and time.   Psychiatric:         Behavior: Behavior normal.         Thought Content: Thought content normal.          Lab Results   Component Value Date    WBC 5.62 04/26/2024    HGB 12.4 04/26/2024    HCT 38.7 04/26/2024    MCV 96 04/26/2024     04/26/2024     Lab Results   Component Value Date    CALCIUM 9.0 04/26/2024    K 4.2 04/26/2024    CO2 27 04/26/2024     04/26/2024    BUN 13 04/26/2024    CREATININE 0.86 04/26/2024     Lab Results   Component Value Date    ALT 27 03/28/2023    AST 21 03/28/2023    ALKPHOS 63 03/28/2023     Lab Results   Component Value Date    INR 0.96 04/05/2023    PROTIME 13.0 04/05/2023       No results found.

## 2024-05-15 NOTE — ASSESSMENT & PLAN NOTE
Atypical chest pressure appears to be secondary to acid reflux and probable esophagitis since the cardiac workup was negative.  Patient was noted to have 4 cm hiatal hernia on the previous upper endoscopy.    -Patient was explained about the lifestyle and dietary modifications.  Advised to avoid fatty foods, chocolates, caffeine, alcohol and any other triggering foods.  Avoid eating for at least 3 hours before going to bed.    -Increase rabeprazole to 20 mg twice daily    -Advised patient to call for any worsening or if no improvement and we can consider upper endoscopy at that time    -Follow-up office visit in a couple of months.

## 2024-05-17 ENCOUNTER — TELEPHONE (OUTPATIENT)
Age: 79
End: 2024-05-17

## 2024-05-17 DIAGNOSIS — K21.9 GASTROESOPHAGEAL REFLUX DISEASE WITHOUT ESOPHAGITIS: ICD-10-CM

## 2024-05-17 RX ORDER — RABEPRAZOLE SODIUM 20 MG/1
20 TABLET, DELAYED RELEASE ORAL 2 TIMES DAILY
Qty: 60 TABLET | Refills: 11 | Status: SHIPPED | OUTPATIENT
Start: 2024-05-17

## 2024-05-17 NOTE — TELEPHONE ENCOUNTER
Medication sent to wrong pharmacy.     Updated to the [facility] near the patient. Medication is cheaper there.     Can this please be resent to the new pharmacy in the chart?

## 2024-05-30 ENCOUNTER — OFFICE VISIT (OUTPATIENT)
Dept: SURGERY | Facility: CLINIC | Age: 79
End: 2024-05-30
Payer: MEDICARE

## 2024-05-30 VITALS
TEMPERATURE: 97.2 F | DIASTOLIC BLOOD PRESSURE: 68 MMHG | HEART RATE: 71 BPM | BODY MASS INDEX: 30.22 KG/M2 | WEIGHT: 228 LBS | HEIGHT: 73 IN | OXYGEN SATURATION: 96 % | SYSTOLIC BLOOD PRESSURE: 112 MMHG | RESPIRATION RATE: 14 BRPM

## 2024-05-30 DIAGNOSIS — K04.7 DENTAL ABSCESS: ICD-10-CM

## 2024-05-30 DIAGNOSIS — K42.9 UMBILICAL HERNIA WITHOUT OBSTRUCTION AND WITHOUT GANGRENE: Primary | ICD-10-CM

## 2024-05-30 PROCEDURE — 99212 OFFICE O/P EST SF 10 MIN: CPT | Performed by: SURGERY

## 2024-05-30 RX ORDER — AMOXICILLIN 500 MG/1
500 CAPSULE ORAL EVERY 8 HOURS SCHEDULED
Qty: 21 CAPSULE | Refills: 0 | Status: SHIPPED | OUTPATIENT
Start: 2024-05-30 | End: 2024-06-06

## 2024-05-30 NOTE — PROGRESS NOTES
"Assessment/Plan:     Diagnoses and all orders for this visit:    Umbilical hernia without obstruction and without gangrene    Dental abscess  -     amoxicillin (AMOXIL) 500 mg capsule; Take 1 capsule (500 mg total) by mouth every 8 (eight) hours for 21 doses     Status post open repair of umbilical hernia with Ventralex mesh    Pathology report checked    Uncomplicated postop recovery    Discharge and see as needed    Subjective:      Patient ID: Prince Branch is a 79 y.o. male.    HPI   Patient is 3 weeks status post open repair of an umbilical hernia performed with a Ventralex mesh.  Patient has no complaints.    The following portions of the patient's history were reviewed and updated as appropriate: allergies, current medications, past family history, past medical history, past social history, past surgical history, and problem list.    Review of Systems    No fever    Objective:    /68 (BP Location: Left arm, Patient Position: Sitting)   Pulse 71   Temp (!) 97.2 °F (36.2 °C) (Tympanic)   Resp 14   Ht 6' 1\" (1.854 m)   Wt 103 kg (228 lb)   SpO2 96%   BMI 30.08 kg/m²      Physical Exam    The umbilical herniorrhaphy site is well-healed with no evidence of infection.  The cosmetic result is good.  "

## 2024-06-21 ENCOUNTER — REMOTE DEVICE CLINIC VISIT (OUTPATIENT)
Dept: CARDIOLOGY CLINIC | Facility: CLINIC | Age: 79
End: 2024-06-21
Payer: MEDICARE

## 2024-06-21 DIAGNOSIS — Z95.0 CARDIAC PACEMAKER IN SITU: Primary | ICD-10-CM

## 2024-06-21 PROCEDURE — 93294 REM INTERROG EVL PM/LDLS PM: CPT | Performed by: INTERNAL MEDICINE

## 2024-06-21 PROCEDURE — 93296 REM INTERROG EVL PM/IDS: CPT | Performed by: INTERNAL MEDICINE

## 2024-06-21 NOTE — PROGRESS NOTES
Results for orders placed or performed in visit on 06/21/24   Cardiac EP device report    Narrative    [facility] DC PM/ACTIVE SYSTEM IS MRI CONDITIONAL  MERLIN TRANSMISSION: BATTERY VOLTAGE ADEQUATE (3.3 YR.).  AP 52%   72% (>40%/AVB/DDDR 60 PPM, VIP ON).  ALL AVAILABLE LEAD PARAMETERS WITHIN NORMAL LIMITS/STABLE.  NO SIGNIFICANT HIGH RATE EPISODES.  NORMAL DEVICE FUNCTION.  ES

## 2024-07-22 ENCOUNTER — TELEPHONE (OUTPATIENT)
Age: 79
End: 2024-07-22

## 2024-07-22 DIAGNOSIS — K21.9 GASTROESOPHAGEAL REFLUX DISEASE, UNSPECIFIED WHETHER ESOPHAGITIS PRESENT: Primary | ICD-10-CM

## 2024-07-22 RX ORDER — RABEPRAZOLE SODIUM 20 MG/1
20 TABLET, DELAYED RELEASE ORAL
Qty: 60 TABLET | Refills: 11 | Status: SHIPPED | OUTPATIENT
Start: 2024-07-22

## 2024-07-22 NOTE — TELEPHONE ENCOUNTER
Patient of Dr Lares calling about his Rabeprazole.  Patient is taking rabeprazole 20mg BID.   His medication was discontinued from his medication list at his Gen Surg office visit by mistake on 5/30/24.    Patient tried to refill his medication but they were not able to  fill because of the discontinuation.  Original script had11 refills on it.    Should patient still be taking this medication? Patient would like to know if a new script can be submitted so that he can fill med.    Patient uses  [id number] - [place], [place] - [address] [facility]  [address] [place] [address]  Phone: [phone]  Fax: [phone]     Please advise  #[phone]

## 2024-07-22 NOTE — TELEPHONE ENCOUNTER
Sent script for BID. Should continue this until follow up in September, then can decide about decreasing back to once daily pending symptoms.

## 2024-07-22 NOTE — TELEPHONE ENCOUNTER
"Patients GI provider:  Dr. Lares    Number to return call: [phone]    Reason for call: Pt calling to inquire why rabeprazole was no longer on his med list for reorder. Advised pt that is looks like Dr. Gama in Gen Surgery ended this on his med list w/a reason of 'Therapy completed.\" Transferred to [place] in Gen Surgery for further assistance.     Scheduled procedure/appointment date if applicable: Apt 09/25/2024  "

## 2024-07-22 NOTE — TELEPHONE ENCOUNTER
Called and left message regarding Rabeprazole being sent  to the [facility] pharmacy as requested. Instructed patient to call with any further questions or concerns.

## 2024-08-08 ENCOUNTER — OFFICE VISIT (OUTPATIENT)
Dept: FAMILY MEDICINE CLINIC | Facility: CLINIC | Age: 79
End: 2024-08-08
Payer: MEDICARE

## 2024-08-08 VITALS
WEIGHT: 226 LBS | OXYGEN SATURATION: 99 % | SYSTOLIC BLOOD PRESSURE: 126 MMHG | BODY MASS INDEX: 29.95 KG/M2 | HEART RATE: 60 BPM | HEIGHT: 73 IN | DIASTOLIC BLOOD PRESSURE: 70 MMHG | TEMPERATURE: 97.7 F

## 2024-08-08 DIAGNOSIS — I42.9 CARDIOMYOPATHY, UNSPECIFIED TYPE (HCC): ICD-10-CM

## 2024-08-08 DIAGNOSIS — R53.83 OTHER FATIGUE: ICD-10-CM

## 2024-08-08 DIAGNOSIS — E78.5 HYPERLIPIDEMIA, UNSPECIFIED HYPERLIPIDEMIA TYPE: ICD-10-CM

## 2024-08-08 DIAGNOSIS — K21.9 GASTROESOPHAGEAL REFLUX DISEASE, UNSPECIFIED WHETHER ESOPHAGITIS PRESENT: ICD-10-CM

## 2024-08-08 DIAGNOSIS — M81.0 OSTEOPOROSIS WITHOUT CURRENT PATHOLOGICAL FRACTURE, UNSPECIFIED OSTEOPOROSIS TYPE: ICD-10-CM

## 2024-08-08 DIAGNOSIS — I48.0 PAROXYSMAL ATRIAL FIBRILLATION (HCC): ICD-10-CM

## 2024-08-08 DIAGNOSIS — Z00.00 WELL ADULT EXAM: Primary | ICD-10-CM

## 2024-08-08 PROCEDURE — 99204 OFFICE O/P NEW MOD 45 MIN: CPT | Performed by: FAMILY MEDICINE

## 2024-08-08 RX ORDER — ROSUVASTATIN CALCIUM 20 MG/1
20 TABLET, COATED ORAL DAILY
Qty: 90 TABLET | Refills: 1 | Status: SHIPPED | OUTPATIENT
Start: 2024-08-08 | End: 2024-08-16 | Stop reason: SDUPTHER

## 2024-08-08 NOTE — PROGRESS NOTES
Ambulatory Visit  Name: Prince Branch      : 1945      MRN: [id number]  Encounter Provider: Jeremías Gibbons MD  Encounter Date: 2024   Encounter department: [facility]    Assessment & Plan   1. Well adult exam  2. Osteoporosis without current pathological fracture, unspecified osteoporosis type  Assessment & Plan:  Patient to continue with weight bearing exercises as much as possible and oral intake of 1200 mg of calcium with 800 IU of Vit D to maximize bone protection. Pt  to continue  with DEXA scan every 2 years to reassess. All qustions regarding this problem answered today to patient satisfaction.   3. Gastroesophageal reflux disease, unspecified whether esophagitis present  Assessment & Plan:  Patient to continue with present therapy and decrease caffeine, avoid ETOH and smoking to decrease acid production. Pt should also cease eating prior to bedtime and avoid excessive fluid intake prior to sleep. May use antacids as needed for breakthrough GERD. All pateint questions answered today about this condition.   4. Cardiomyopathy, unspecified type (HCC)  Assessment & Plan:  Patient is stable  and will continue present plan of care and reassess at next routine visit. All questions about this problem from patient were answered today.   5. Hyperlipidemia, unspecified hyperlipidemia type  -     rosuvastatin (CRESTOR) 20 MG tablet; Take 1 tablet (20 mg total) by mouth daily  -     Lipid Panel with Direct LDL reflex; Future  6. Paroxysmal atrial fibrillation (HCC)  7. Other fatigue  -     Comprehensive metabolic panel; Future  -     CBC and differential; Future  -     TSH, 3rd generation with Free T4 reflex; Future  -     Magnesium; Future  -     Uric acid; Future  -     UA/M w/rflx Culture, Comp      Depression Screening and Follow-up Plan: Patient was screened for depression during today's encounter. They screened negative with a PHQ-2 score of 0.    Falls Plan of Care:  balance, strength, and gait training instructions were provided. Home safety education provided.       History of Present Illness     HPI  Review of Systems  Past Medical History:   Diagnosis Date   • Bradycardia     permanent pacemaker   • Chest pain    • Colon polyp    • Coronary artery disease 2016   • GERD (gastroesophageal reflux disease)    • Hearing aid worn    • High cholesterol    • Hypertension      Past Surgical History:   Procedure Laterality Date   • CARDIAC CATHETERIZATION Left 4/5/2023    Procedure: Cardiac Left Heart Cath;  Surgeon: Alexis Brower MD;  [facility];  Service: Cardiology   • CARDIAC PACEMAKER PLACEMENT  2017    sees cardiologist in [place]- last visit was 5/2020 and has a monitor to check pacemaker   • CARDIAC PACEMAKER PLACEMENT     • COLONOSCOPY     • CYSTOSCOPY  2018   • HERNIA REPAIR     • CO RPR AA HERNIA 1ST < 3 CM REDUCIBLE N/A 5/8/2024    Procedure: REPAIR OF UMBILICAL HERIA;  Surgeon: Brian Gama MD;  Location:  MAIN OR;  Service: General   • CO RPR RECRT INGUINAL HERNIA ANY AGE REDUCIBLE Left 12/11/2020    Procedure: REPAIR HERNIA INGUINAL;  Surgeon: Brian Gama MD;  Location: [facility];  Service: General     Family History   Problem Relation Age of Onset   • Cancer Mother    • Colon cancer Mother    • Breast cancer Sister 82   • Hearing loss Sister      Social History     Tobacco Use   • Smoking status: Former     Types: Cigarettes   • Smokeless tobacco: Never   Vaping Use   • Vaping status: Never Used   Substance and Sexual Activity   • Alcohol use: Yes     Comment: occ-weekly   • Drug use: Never   • Sexual activity: Not Currently     Current Outpatient Medications on File Prior to Visit   Medication Sig   • apixaban (Eliquis) 5 mg TAKE ONE TABLET BY MOUTH TWO TIMES A DAY   • aspirin 81 mg chewable tablet Chew 81 mg daily   • CALCIUM PO Take by mouth   • Ferrous Sulfate (IRON PO) Take by mouth   • Glutamine 500 MG CAPS Take by mouth every  "morning   • multivitamin (THERAGRAN) TABS Take 1 tablet by mouth daily   • nitroglycerin (NITROSTAT) 0.4 mg SL tablet PLACE ONE TABLET (0.4 MG TOTAL) UNDER THE TONGUE EVERY FIVE MINUTES AS NEEDED FOR CHEST PAIN   • RABEprazole (ACIPHEX) 20 MG tablet Take 1 tablet (20 mg total) by mouth 2 (two) times a day before meals   • vitamin B-12 (CYANOCOBALAMIN) 100 MCG TABS Take 50 mcg by mouth daily   • docusate sodium (COLACE) 100 mg capsule Take 1 capsule (100 mg total) by mouth 2 (two) times a day for 7 days   • sacubitril-valsartan (Entresto) 24-26 MG TABS Take 1 tablet by mouth 2 (two) times a day (Patient not taking: Reported on 8/8/2024)     Allergies   Allergen Reactions   • Other Anaphylaxis     WALNUT     Immunization History   Administered Date(s) Administered   • COVID-19 MODERNA VACC 0.5 ML IM 02/04/2021, 03/04/2021, 12/23/2021   • Influenza, high dose seasonal 0.7 mL 11/02/2021, 11/17/2022   • Influenza, seasonal, injectable 01/14/2011, 12/09/2011, 10/11/2017   • Pneumococcal Conjugate Vaccine 20-valent (Pcv20), Polysace 01/16/2023   • Pneumococcal Polysaccharide PPV23 08/05/2021   • Td (adult), adsorbed 04/30/2010     Objective     /70 (BP Location: Left arm, Patient Position: Sitting, Cuff Size: Large)   Pulse 60   Temp 97.7 °F (36.5 °C) (Skin)   Ht 6' 1\" (1.854 m)   Wt 103 kg (226 lb)   SpO2 99%   BMI 29.82 kg/m²     Physical Exam    "

## 2024-08-16 DIAGNOSIS — E78.5 HYPERLIPIDEMIA, UNSPECIFIED HYPERLIPIDEMIA TYPE: ICD-10-CM

## 2024-08-16 RX ORDER — ROSUVASTATIN CALCIUM 20 MG/1
20 TABLET, COATED ORAL DAILY
Qty: 90 TABLET | Refills: 1 | Status: SHIPPED | OUTPATIENT
Start: 2024-08-16

## 2024-08-16 NOTE — TELEPHONE ENCOUNTER
Medication:   Requested Prescriptions     Pending Prescriptions Disp Refills    atorvastatin (LIPITOR) 80 MG tablet [Pharmacy Med Name: Atorvastatin Calcium 80 MG Oral Tablet] 100 tablet 2     Sig: TAKE 1 TABLET BY MOUTH EVERY  NIGHT     Last Filled:  #90 with 3 refills on 9/25/23    Last appt: 4/15/2024   Next appt: 7/24/2024    Last OARRS:        No data to display                Patient called the RX Refill Line. Message is being forwarded to the office.     Patient is requesting the script for the generic crestor be resent to the [facility] as he was told by them that they never got the script that was sent on the 8th  Pt only has 2 tablets left, is hoping this can be refilled today

## 2024-08-22 ENCOUNTER — TELEPHONE (OUTPATIENT)
Age: 79
End: 2024-08-22

## 2024-08-22 DIAGNOSIS — J30.1 SEASONAL ALLERGIC RHINITIS DUE TO POLLEN: Primary | ICD-10-CM

## 2024-08-22 RX ORDER — FLUTICASONE PROPIONATE 50 MCG
1 SPRAY, SUSPENSION (ML) NASAL DAILY
Qty: 9.9 ML | Refills: 1 | Status: SHIPPED | OUTPATIENT
Start: 2024-08-22

## 2024-08-22 NOTE — TELEPHONE ENCOUNTER
Spoke with [facility] it is unavailable there due to it being last filled at [facility] he should have enough to make it until October. Spoke with the patient he is going to give [facility] a call because he is out of pills.

## 2024-08-22 NOTE — TELEPHONE ENCOUNTER
Pt. Went to [facility] to  his Rosuvastatin and they said it wasn't there. Can you resend the medication to [facility]  in [place]    please.    Ty

## 2024-08-27 ENCOUNTER — APPOINTMENT (OUTPATIENT)
Dept: LAB | Facility: MEDICAL CENTER | Age: 79
End: 2024-08-27
Payer: MEDICARE

## 2024-08-27 DIAGNOSIS — R53.83 OTHER FATIGUE: ICD-10-CM

## 2024-08-27 DIAGNOSIS — E78.5 HYPERLIPIDEMIA, UNSPECIFIED HYPERLIPIDEMIA TYPE: ICD-10-CM

## 2024-08-27 LAB
ALBUMIN SERPL BCG-MCNC: 4.2 G/DL (ref 3.5–5)
ALP SERPL-CCNC: 58 U/L (ref 34–104)
ALT SERPL W P-5'-P-CCNC: 15 U/L (ref 7–52)
ANION GAP SERPL CALCULATED.3IONS-SCNC: 10 MMOL/L (ref 4–13)
AST SERPL W P-5'-P-CCNC: 19 U/L (ref 13–39)
BACTERIA UR QL AUTO: NORMAL /HPF
BASOPHILS # BLD AUTO: 0.03 THOUSANDS/ÂΜL (ref 0–0.1)
BASOPHILS NFR BLD AUTO: 0 % (ref 0–1)
BILIRUB SERPL-MCNC: 0.55 MG/DL (ref 0.2–1)
BILIRUB UR QL STRIP: NEGATIVE
BUN SERPL-MCNC: 21 MG/DL (ref 5–25)
CALCIUM SERPL-MCNC: 9.6 MG/DL (ref 8.4–10.2)
CHLORIDE SERPL-SCNC: 101 MMOL/L (ref 96–108)
CHOLEST SERPL-MCNC: 161 MG/DL
CLARITY UR: CLEAR
CO2 SERPL-SCNC: 25 MMOL/L (ref 21–32)
COLOR UR: YELLOW
CREAT SERPL-MCNC: 0.98 MG/DL (ref 0.6–1.3)
EOSINOPHIL # BLD AUTO: 0.11 THOUSAND/ÂΜL (ref 0–0.61)
EOSINOPHIL NFR BLD AUTO: 1 % (ref 0–6)
ERYTHROCYTE [DISTWIDTH] IN BLOOD BY AUTOMATED COUNT: 13.2 % (ref 11.6–15.1)
GFR SERPL CREATININE-BSD FRML MDRD: 74 ML/MIN/1.73SQ M
GLUCOSE P FAST SERPL-MCNC: 98 MG/DL (ref 65–99)
GLUCOSE UR STRIP-MCNC: NEGATIVE MG/DL
HCT VFR BLD AUTO: 39.5 % (ref 36.5–49.3)
HDLC SERPL-MCNC: 54 MG/DL
HGB BLD-MCNC: 13.1 G/DL (ref 12–17)
HGB UR QL STRIP.AUTO: ABNORMAL
IMM GRANULOCYTES # BLD AUTO: 0.02 THOUSAND/UL (ref 0–0.2)
IMM GRANULOCYTES NFR BLD AUTO: 0 % (ref 0–2)
KETONES UR STRIP-MCNC: NEGATIVE MG/DL
LDLC SERPL CALC-MCNC: 72 MG/DL (ref 0–100)
LEUKOCYTE ESTERASE UR QL STRIP: NEGATIVE
LYMPHOCYTES # BLD AUTO: 3.5 THOUSANDS/ÂΜL (ref 0.6–4.47)
LYMPHOCYTES NFR BLD AUTO: 47 % (ref 14–44)
MAGNESIUM SERPL-MCNC: 2.1 MG/DL (ref 1.9–2.7)
MCH RBC QN AUTO: 30.9 PG (ref 26.8–34.3)
MCHC RBC AUTO-ENTMCNC: 33.2 G/DL (ref 31.4–37.4)
MCV RBC AUTO: 93 FL (ref 82–98)
MONOCYTES # BLD AUTO: 0.53 THOUSAND/ÂΜL (ref 0.17–1.22)
MONOCYTES NFR BLD AUTO: 7 % (ref 4–12)
NEUTROPHILS # BLD AUTO: 3.45 THOUSANDS/ÂΜL (ref 1.85–7.62)
NEUTS SEG NFR BLD AUTO: 45 % (ref 43–75)
NITRITE UR QL STRIP: NEGATIVE
NON-SQ EPI CELLS URNS QL MICRO: NORMAL /HPF
NRBC BLD AUTO-RTO: 0 /100 WBCS
PH UR STRIP.AUTO: 6.5 [PH]
PLATELET # BLD AUTO: 203 THOUSANDS/UL (ref 149–390)
PMV BLD AUTO: 11.6 FL (ref 8.9–12.7)
POTASSIUM SERPL-SCNC: 5.1 MMOL/L (ref 3.5–5.3)
PROT SERPL-MCNC: 7.2 G/DL (ref 6.4–8.4)
PROT UR STRIP-MCNC: ABNORMAL MG/DL
RBC # BLD AUTO: 4.24 MILLION/UL (ref 3.88–5.62)
RBC #/AREA URNS AUTO: NORMAL /HPF
SODIUM SERPL-SCNC: 136 MMOL/L (ref 135–147)
SP GR UR STRIP.AUTO: 1.02 (ref 1–1.03)
TRIGL SERPL-MCNC: 174 MG/DL
TSH SERPL DL<=0.05 MIU/L-ACNC: 2.19 UIU/ML (ref 0.45–4.5)
URATE SERPL-MCNC: 5.7 MG/DL (ref 3.5–8.5)
UROBILINOGEN UR STRIP-ACNC: <2 MG/DL
WBC # BLD AUTO: 7.64 THOUSAND/UL (ref 4.31–10.16)
WBC #/AREA URNS AUTO: NORMAL /HPF

## 2024-08-27 PROCEDURE — 83735 ASSAY OF MAGNESIUM: CPT

## 2024-08-27 PROCEDURE — 36415 COLL VENOUS BLD VENIPUNCTURE: CPT

## 2024-08-27 PROCEDURE — 80061 LIPID PANEL: CPT

## 2024-08-27 PROCEDURE — 81001 URINALYSIS AUTO W/SCOPE: CPT

## 2024-08-27 PROCEDURE — 80053 COMPREHEN METABOLIC PANEL: CPT

## 2024-08-27 PROCEDURE — 85025 COMPLETE CBC W/AUTO DIFF WBC: CPT

## 2024-08-27 PROCEDURE — 84550 ASSAY OF BLOOD/URIC ACID: CPT

## 2024-08-27 PROCEDURE — 84443 ASSAY THYROID STIM HORMONE: CPT

## 2024-09-03 DIAGNOSIS — I42.8 NON-ISCHEMIC CARDIOMYOPATHY (HCC): ICD-10-CM

## 2024-09-03 NOTE — TELEPHONE ENCOUNTER
Reason for call:   [x] Refill   [] Prior Auth  [] Other:     Office:   [] PCP/Provider -   [x] Specialty/Provider - Cardio        Does the patient have enough for 3 days?   [x] Yes   [] No - Send as HP to POD

## 2024-09-04 RX ORDER — SACUBITRIL AND VALSARTAN 24; 26 MG/1; MG/1
1 TABLET, FILM COATED ORAL 2 TIMES DAILY
Qty: 60 TABLET | Refills: 0 | Status: SHIPPED | OUTPATIENT
Start: 2024-09-04 | End: 2024-09-09 | Stop reason: SDUPTHER

## 2024-09-06 NOTE — TELEPHONE ENCOUNTER
Patient called the RX Refill Line. Message is being forwarded to the office.     Patient is requesting a new script for the entresto be sent to the [facility] pharmacy but for a 90 day supply. Pt gets coverage for this medication through the [employer] program and it'll save him over $100 each time if he can have a 90 day supply instead of a month's worth      Please contact patient at

## 2024-09-09 DIAGNOSIS — I42.8 NON-ISCHEMIC CARDIOMYOPATHY (HCC): ICD-10-CM

## 2024-09-09 RX ORDER — SACUBITRIL AND VALSARTAN 24; 26 MG/1; MG/1
1 TABLET, FILM COATED ORAL 2 TIMES DAILY
Qty: 180 TABLET | Refills: 1 | Status: SHIPPED | OUTPATIENT
Start: 2024-09-09 | End: 2024-09-17 | Stop reason: SDUPTHER

## 2024-09-09 NOTE — TELEPHONE ENCOUNTER
Reason for call: Not a duplicate. Patient is requesting a 90 days supply due to his copay being less.  [x] Refill   [] Prior Auth  [] Other:     Office:   [] PCP/Provider -   [x] Specialty/Provider - Cardio/Oli Mejia MD     Medication: sacubitril-valsartan (Entresto) 24-26 MG TABS     Dose/Frequency: Take 1 tablet by mouth 2 (two) times a day     Quantity: 180    Pharmacy: [facility] - [address]     Does the patient have enough for 3 days?   [] Yes   [x] No - Send as HP to POD

## 2024-09-12 NOTE — TELEPHONE ENCOUNTER
Patient called to follow up on this script. He is completely out of medication. Please call him once the script is sent if possible. [phone]

## 2024-09-17 DIAGNOSIS — I42.8 NON-ISCHEMIC CARDIOMYOPATHY (HCC): ICD-10-CM

## 2024-09-17 RX ORDER — SACUBITRIL AND VALSARTAN 24; 26 MG/1; MG/1
1 TABLET, FILM COATED ORAL 2 TIMES DAILY
Qty: 180 TABLET | Refills: 1 | Status: SHIPPED | OUTPATIENT
Start: 2024-09-17

## 2024-09-17 NOTE — TELEPHONE ENCOUNTER
Reason for call: Not a duplicate. Sent to wrong pharmacy.   [x] Refill   [] Prior Auth  [] Other:     Office:   [] PCP/Provider -   [x] Specialty/Provider - Cardio    Medication: sacubitril-valsartan (Entresto) 24-26 MG TABS     Dose/Frequency: Take 1 tablet by mouth 2 (two) times a day     Quantity: 180    Pharmacy: [place] [place] - [address]     Does the patient have enough for 3 days?   [] Yes   [x] No - Send as HP to POD

## 2024-09-20 ENCOUNTER — REMOTE DEVICE CLINIC VISIT (OUTPATIENT)
Dept: CARDIOLOGY CLINIC | Facility: CLINIC | Age: 79
End: 2024-09-20
Payer: MEDICARE

## 2024-09-20 DIAGNOSIS — I49.5 SSS (SICK SINUS SYNDROME) (HCC): ICD-10-CM

## 2024-09-20 DIAGNOSIS — I48.91 ATRIAL FIBRILLATION, UNSPECIFIED TYPE (HCC): Primary | ICD-10-CM

## 2024-09-20 PROCEDURE — 93294 REM INTERROG EVL PM/LDLS PM: CPT | Performed by: INTERNAL MEDICINE

## 2024-09-20 PROCEDURE — 93296 REM INTERROG EVL PM/IDS: CPT | Performed by: INTERNAL MEDICINE

## 2024-09-20 NOTE — PROGRESS NOTES
Results for orders placed or performed in visit on 09/20/24   Cardiac EP device report    Narrative    SJM DC PM/ACTIVE SYSTEM IS MRI CONDITIONAL  MERLIN TRANSMISSION: BATTERY VOLTAGE ADEQUATE (3.1 YRS). AP: 62%. : 74% (>40%~DDDR/60). ALL AVAILABLE LEAD PARAMETERS WITHIN NORMAL LIMITS. 3 AMS EPISODES W/ AVAIL EGMS SHOWING PAT, MAX DURATION 1 MIN 26 SECS. AF BURDEN: <1%. PT TAKES ELIQUIS, ENTRESTO, ASA 81MG. EF: 45% (ECHO 4/29/24). NORMAL DEVICE FUNCTION. CH

## 2024-09-25 ENCOUNTER — OFFICE VISIT (OUTPATIENT)
Dept: GASTROENTEROLOGY | Facility: AMBULARY SURGERY CENTER | Age: 79
End: 2024-09-25
Payer: MEDICARE

## 2024-09-25 ENCOUNTER — TELEPHONE (OUTPATIENT)
Dept: GASTROENTEROLOGY | Facility: AMBULARY SURGERY CENTER | Age: 79
End: 2024-09-25

## 2024-09-25 VITALS
SYSTOLIC BLOOD PRESSURE: 138 MMHG | BODY MASS INDEX: 29.9 KG/M2 | HEIGHT: 73 IN | OXYGEN SATURATION: 96 % | DIASTOLIC BLOOD PRESSURE: 78 MMHG | HEART RATE: 78 BPM | WEIGHT: 225.6 LBS

## 2024-09-25 DIAGNOSIS — Z80.0 FAMILY HISTORY OF COLON CANCER: ICD-10-CM

## 2024-09-25 DIAGNOSIS — Z86.010 HISTORY OF COLON POLYPS: ICD-10-CM

## 2024-09-25 DIAGNOSIS — Z86.0100 HISTORY OF COLON POLYPS: ICD-10-CM

## 2024-09-25 DIAGNOSIS — K21.9 GASTROESOPHAGEAL REFLUX DISEASE WITHOUT ESOPHAGITIS: Primary | ICD-10-CM

## 2024-09-25 PROCEDURE — 99213 OFFICE O/P EST LOW 20 MIN: CPT | Performed by: PHYSICIAN ASSISTANT

## 2024-09-25 NOTE — ASSESSMENT & PLAN NOTE
-Last colonoscopy April 2022 with adequate bowel prep with hyperplastic rectal polyp removed, left-sided diverticulosis noted and small internal hemorrhoids.  -Next colonoscopy April 2027

## 2024-09-25 NOTE — PATIENT INSTRUCTIONS
Scheduled date of EGD(as of today):1/27/25  Physician performing EGD: Dr. Sagastume  Location of EGD: an   Instructions reviewed with patient by: quan  Clearances: eliquis

## 2024-09-25 NOTE — PROGRESS NOTES
Ambulatory Visit  Name: Prince Branch      : 1945      MRN: [id number]  Encounter Provider: George Contreras PA-C  Encounter Date: 2024   Encounter department: [facility] [place]    Assessment & Plan  Gastroesophageal reflux disease without esophagitis  - Patient is currently not stable on Aciphex 20 mg twice daily no pain or difficulty swallowing.  No unintentional weight loss.  -Last EGD 2022 with medium sliding hiatal hernia and gastritis.  Mid and distal esophageal biopsies negative for eosinophilic esophagitis.  Distal esophageal biopsy negative for Urban's esophagus.  Gastric biopsy negative for H. Pylori  -Will plan EGD due to instability of GERD, epigastric discomfort and cough. -Concern for hiatal hernia worsening may be leading to cough and worsening reflux and may need repair  -Will consider video swallow with speech if cough is persistent      History of colon polyps  -Last colonoscopy 2022 with adequate bowel prep with hyperplastic rectal polyp removed, left-sided diverticulosis noted and small internal hemorrhoids.  -Next colonoscopy 2027    Family history of colon cancer  -With mother      History of Present Illness     Prince Branch is a 79 y.o. male new to me with past medical history of atrial fibrillation on Eliquis, cardiomyopathy, GERD, hyperlipidemia, seasonal allergies sensorineural hearing loss, osteoporosis, family history of colon cancer with his mother and personal history of colon polyps who presents for follow-up to GERD.  He was last seen with Dr. Lares on May 15, 2024 and his Aciphex (rabeprazole) was increased from 20 mg daily to twice daily.    He feels the increase in Aciphex has helped a little bit.  He does notice that he coughs often with eating.  No nausea or vomiting.  He had some chest tightness and epigastric discomfort about a year ago and did have a cardiac catheterization which was normal.  He  reports occasional need for Metamucil with regard to his bowels.  He denies any significant weight loss.  Denies seasonal allergies or asthma.      Endoscopic History  EGD - April 2022 with medium sliding hiatal hernia and gastritis.  Mid and distal esophageal biopsies negative for eosinophilic esophagitis.  Distal esophageal biopsy negative for Urban's esophagus.  Gastric biopsy negative for H. pylori  Colonoscopy -April 2022 with adequate bowel prep with hyperplastic rectal polyp removed, left-sided diverticulosis noted and small internal hemorrhoids.        Review of Systems  As per HPI, 12 point review of systems reviewed and otherwise negative        Objective     There were no vitals taken for this visit.    Physical Exam  Constitutional:       General: He is not in acute distress.     Appearance: Normal appearance.   HENT:      Head: Normocephalic and atraumatic.   Eyes:      General: No scleral icterus.  Pulmonary:      Effort: Pulmonary effort is normal. No respiratory distress.      Breath sounds: Normal breath sounds.   Abdominal:      General: Bowel sounds are normal. There is no distension.      Palpations: Abdomen is soft.      Tenderness: There is no abdominal tenderness. There is no guarding.   Skin:     General: Skin is warm and dry.   Neurological:      Mental Status: He is alert and oriented to person, place, and time.   Psychiatric:         Mood and Affect: Mood normal.         Behavior: Behavior normal.

## 2024-09-25 NOTE — LETTER
2024     Luzmaria Chaney DO  [address] [place] [address]  [address] [place] [id number]    Patient: Prince Branch   YOB: 1945   Date of Visit: 2024       Dear Dr. Chaney:    Thank you for referring Prince Branch to me for evaluation. Below are my notes for this consultation.    If you have questions, please do not hesitate to call me. I look forward to following your patient along with you.         Sincerely,        George Contreras PA-C        CC: No Recipients    George Contreras PA-C  2024 10:56 AM  Sign when Signing Visit  Ambulatory Visit  Name: Prince Branch      : 1945      MRN: [id number]  Encounter Provider: George Contreras PA-C  Encounter Date: 2024   Encounter department: [facility] [place]    Assessment & Plan  Gastroesophageal reflux disease without esophagitis  - Patient is currently stable on Aciphex 20 mg twice daily.   No current daytime or night time break through symptoms.  No pain or difficulty swallowing.  No unintentional weight loss.  -Last EGD 2022 with medium sliding hiatal hernia and gastritis.  Mid and distal esophageal biopsies negative for eosinophilic esophagitis.  Distal esophageal biopsy negative for Urban's esophagus.  Gastric biopsy negative for H. pylori    History of colon polyps  -Last colonoscopy 2022 with adequate bowel prep with hyperplastic rectal polyp removed, left-sided diverticulosis noted and small internal hemorrhoids.  -Next colonoscopy 2027    Family history of colon cancer  -With mother      History of Present Illness    Prince Branch is a 79 y.o. male new to me with past medical history of atrial fibrillation on Eliquis, cardiomyopathy, GERD, hyperlipidemia, seasonal allergies sensorineural hearing loss, osteoporosis, family history of colon cancer with his mother and personal history of colon polyps who presents for follow-up to GERD.   He was last seen with Dr. Lares on May 15, 2024 and his Aciphex (rabeprazole) was increased from 20 mg daily to twice daily.      Endoscopic History  EGD - April 2022 with medium sliding hiatal hernia and gastritis.  Mid and distal esophageal biopsies negative for eosinophilic esophagitis.  Distal esophageal biopsy negative for Urban's esophagus.  Gastric biopsy negative for H. pylori  Colonoscopy -April 2022 with adequate bowel prep with hyperplastic rectal polyp removed, left-sided diverticulosis noted and small internal hemorrhoids.        Review of Systems  As per HPI, 12 point review of systems reviewed and otherwise negative        Objective    There were no vitals taken for this visit.    Physical Exam  Constitutional:       General: He is not in acute distress.     Appearance: Normal appearance.   HENT:      Head: Normocephalic and atraumatic.   Eyes:      General: No scleral icterus.  Pulmonary:      Effort: Pulmonary effort is normal. No respiratory distress.      Breath sounds: Normal breath sounds.   Abdominal:      General: Bowel sounds are normal. There is no distension.      Palpations: Abdomen is soft.      Tenderness: There is no abdominal tenderness. There is no guarding.   Skin:     General: Skin is warm and dry.   Neurological:      Mental Status: He is alert and oriented to person, place, and time.   Psychiatric:         Mood and Affect: Mood normal.         Behavior: Behavior normal.

## 2024-09-25 NOTE — ASSESSMENT & PLAN NOTE
- Patient is currently not stable on Aciphex 20 mg twice daily no pain or difficulty swallowing.  No unintentional weight loss.  -Last EGD April 2022 with medium sliding hiatal hernia and gastritis.  Mid and distal esophageal biopsies negative for eosinophilic esophagitis.  Distal esophageal biopsy negative for Urban's esophagus.  Gastric biopsy negative for H. Pylori  -Will plan EGD due to instability of GERD, epigastric discomfort and cough. -Concern for hiatal hernia worsening may be leading to cough and worsening reflux and may need repair  -Will consider video swallow with speech if cough is persistent

## 2024-10-16 DIAGNOSIS — J30.1 SEASONAL ALLERGIC RHINITIS DUE TO POLLEN: ICD-10-CM

## 2024-10-17 RX ORDER — FLUTICASONE PROPIONATE 50 MCG
SPRAY, SUSPENSION (ML) NASAL
Qty: 16 G | Refills: 1 | Status: SHIPPED | OUTPATIENT
Start: 2024-10-17

## 2024-10-28 ENCOUNTER — TELEPHONE (OUTPATIENT)
Age: 79
End: 2024-10-28

## 2024-10-31 ENCOUNTER — CONSULT (OUTPATIENT)
Dept: SURGERY | Facility: CLINIC | Age: 79
End: 2024-10-31
Payer: MEDICARE

## 2024-10-31 VITALS — OXYGEN SATURATION: 96 % | BODY MASS INDEX: 30.19 KG/M2 | WEIGHT: 227.8 LBS | RESPIRATION RATE: 18 BRPM | HEIGHT: 73 IN

## 2024-10-31 DIAGNOSIS — L73.9 FOLLICULITIS: Primary | ICD-10-CM

## 2024-10-31 PROCEDURE — 99212 OFFICE O/P EST SF 10 MIN: CPT | Performed by: SURGERY

## 2024-10-31 NOTE — PROGRESS NOTES
"Assessment/Plan:     Diagnoses and all orders for this visit:    Folliculitis     Hemiscrotum    Patient told that this should completely resolve within 6 weeks.  If there is any persistent skin lesion at 6 weeks time, then it will need to be excised and it can be done in the office under local anesthesia.    Subjective:      Patient ID: Prince Branch is a 79 y.o. male.    HPI  Patient noticed a red blister on his left hemiscrotum 2 weeks ago.  He was lifting some heavy tires and thought that he had developed a hernia.  He did not have much pain however this reddish lesion has persisted.    The following portions of the patient's history were reviewed and updated as appropriate: allergies, current medications, past family history, past medical history, past social history, past surgical history, and problem list.    Review of Systems      Objective:    Resp 18   Ht 6' 1\" (1.854 m)   Wt 103 kg (227 lb 12.8 oz)   SpO2 96%   BMI 30.05 kg/m²      Physical Exam    Patient was examined in a standing up position.  On the left hemiscrotum there is a's papular reddish lesion which appears to be like folliculitis.  There is no groin hernia and there is no cough impulse on finger invagination.  Hopefully this lesion will resolve however if it persist then it will need to be excised.  "

## 2024-11-14 ENCOUNTER — OFFICE VISIT (OUTPATIENT)
Dept: FAMILY MEDICINE CLINIC | Facility: CLINIC | Age: 79
End: 2024-11-14
Payer: MEDICARE

## 2024-11-14 VITALS
WEIGHT: 230 LBS | HEIGHT: 73 IN | DIASTOLIC BLOOD PRESSURE: 70 MMHG | BODY MASS INDEX: 30.48 KG/M2 | TEMPERATURE: 97.2 F | SYSTOLIC BLOOD PRESSURE: 126 MMHG | HEART RATE: 80 BPM | RESPIRATION RATE: 18 BRPM | OXYGEN SATURATION: 98 %

## 2024-11-14 DIAGNOSIS — I42.9 CARDIOMYOPATHY, UNSPECIFIED TYPE (HCC): ICD-10-CM

## 2024-11-14 DIAGNOSIS — H90.3 SENSORINEURAL HEARING LOSS (SNHL) OF BOTH EARS: ICD-10-CM

## 2024-11-14 DIAGNOSIS — Z00.00 WELL ADULT EXAM: Primary | ICD-10-CM

## 2024-11-14 DIAGNOSIS — E78.00 PURE HYPERCHOLESTEROLEMIA: ICD-10-CM

## 2024-11-14 DIAGNOSIS — K21.9 GASTROESOPHAGEAL REFLUX DISEASE WITHOUT ESOPHAGITIS: ICD-10-CM

## 2024-11-14 DIAGNOSIS — Z23 ENCOUNTER FOR IMMUNIZATION: ICD-10-CM

## 2024-11-14 DIAGNOSIS — B35.6 JOCK ITCH: ICD-10-CM

## 2024-11-14 DIAGNOSIS — E78.5 HYPERLIPIDEMIA, UNSPECIFIED HYPERLIPIDEMIA TYPE: ICD-10-CM

## 2024-11-14 PROCEDURE — G0439 PPPS, SUBSEQ VISIT: HCPCS | Performed by: FAMILY MEDICINE

## 2024-11-14 PROCEDURE — G0008 ADMIN INFLUENZA VIRUS VAC: HCPCS | Performed by: FAMILY MEDICINE

## 2024-11-14 PROCEDURE — 90662 IIV NO PRSV INCREASED AG IM: CPT | Performed by: FAMILY MEDICINE

## 2024-11-14 PROCEDURE — 99214 OFFICE O/P EST MOD 30 MIN: CPT | Performed by: FAMILY MEDICINE

## 2024-11-14 RX ORDER — CLOTRIMAZOLE AND BETAMETHASONE DIPROPIONATE 10; .64 MG/G; MG/G
CREAM TOPICAL 2 TIMES DAILY
Qty: 45 G | Refills: 1 | Status: SHIPPED | OUTPATIENT
Start: 2024-11-14

## 2024-11-14 RX ORDER — ROSUVASTATIN CALCIUM 20 MG/1
20 TABLET, COATED ORAL DAILY
Qty: 90 TABLET | Refills: 1 | Status: SHIPPED | OUTPATIENT
Start: 2024-11-14

## 2024-11-14 NOTE — PROGRESS NOTES
Name: Prince Branch      : 1945      MRN: [id number]  Encounter Provider: Jeremías Gibbons MD  Encounter Date: 2024   Encounter department: [facility]    Assessment & Plan  Well adult exam         Sensorineural hearing loss (SNHL) of both ears  Patient is stable  and will continue present plan of care and reassess at next routine visit. All questions about this problem from patient were answered today.          Gastroesophageal reflux disease without esophagitis  Patient to continue with present therapy and decrease caffeine, avoid ETOH and smoking to decrease acid production. Pt should also cease eating prior to bedtime and avoid excessive fluid intake prior to sleep. May use antacids as needed for breakthrough GERD. All pateint questions answered today about this condition.          Pure hypercholesterolemia         Cardiomyopathy, unspecified type (HCC)  Patient is stable  and will continue present plan of care and reassess at next routine visit. All questions about this problem from patient were answered today.          Jock itch    Orders:  •  clotrimazole-betamethasone (LOTRISONE) 1-0.05 % cream; Apply topically 2 (two) times a day    Encounter for immunization    Orders:  •  influenza vaccine, high-dose, PF 0.5 mL (Fluzone High Dose)    Hyperlipidemia, unspecified hyperlipidemia type    Orders:  •  rosuvastatin (CRESTOR) 20 MG tablet; Take 1 tablet (20 mg total) by mouth daily         History of Present Illness     79-year-old male here today for Medicare wellness and checkup on multimedical problems patient with history of coronary disease hypertension hyperlipidemia GERD coronary artery disease and recently with tinea infection in his groin as well as folliculitis.  Patient states that he was taking Entresto by his cardiologist for his heart however he was getting some chest pains with that he had stopped it.  I talked about really being back on medication he  restarted only to have more chest pains at stop that again I have told him he should keep his cardiologist to call them know that he cannot tolerate that medicine and they may want to try different plan of medication for this.  Otherwise he is doing well with his medications he would also like a flu shot today.  Patient has labs reviewed today he is doing very well with his cholesterol he is having no problems liver kidney function and he is doing well with his thyroid and his urinalysis.  He is having no signs of any diabetes.  I will see patient back in approximately 3 months at this point.    Review of Systems   Constitutional:  Negative for activity change, appetite change, chills, fatigue, fever and unexpected weight change.   HENT:  Negative for congestion, ear pain, hearing loss, mouth sores, postnasal drip, sinus pressure, sinus pain, sneezing and sore throat.    Respiratory:  Negative for apnea, cough, shortness of breath and wheezing.    Cardiovascular:  Negative for chest pain, palpitations and leg swelling.   Gastrointestinal:  Negative for abdominal pain, constipation, diarrhea, nausea and vomiting.   Endocrine: Negative for cold intolerance and heat intolerance.   Genitourinary:  Negative for dysuria, frequency and hematuria.   Musculoskeletal:  Negative for arthralgias, back pain, gait problem, joint swelling and neck pain.   Skin:  Negative for rash.   Neurological:  Negative for dizziness, weakness and numbness.   Hematological:  Does not bruise/bleed easily.   Psychiatric/Behavioral:  Negative for agitation, behavioral problems, confusion, hallucinations and sleep disturbance. The patient is not nervous/anxious.      Past Medical History:   Diagnosis Date   • Bradycardia     permanent pacemaker   • Chest pain    • Colon polyp    • Coronary artery disease 2016   • GERD (gastroesophageal reflux disease)    • Hearing aid worn    • High cholesterol    • Hypertension      Past Surgical History:    Procedure Laterality Date   • CARDIAC CATHETERIZATION Left 4/5/2023    Procedure: Cardiac Left Heart Cath;  Surgeon: Alexis Brower MD;  [facility];  Service: Cardiology   • CARDIAC PACEMAKER PLACEMENT  2017    sees cardiologist in [place]- last visit was 5/2020 and has a monitor to check pacemaker   • CARDIAC PACEMAKER PLACEMENT     • COLONOSCOPY     • CYSTOSCOPY  2018   • HERNIA REPAIR     • RI RPR AA HERNIA 1ST < 3 CM REDUCIBLE N/A 5/8/2024    Procedure: REPAIR OF UMBILICAL HERIA;  Surgeon: Brian Gama MD;  Location:  MAIN OR;  Service: General   • RI RPR RECRT INGUINAL HERNIA ANY AGE REDUCIBLE Left 12/11/2020    Procedure: REPAIR HERNIA INGUINAL;  Surgeon: Brian Gama MD;  Location: [facility];  Service: General     Family History   Problem Relation Age of Onset   • Cancer Mother    • Colon cancer Mother    • Breast cancer Sister 82   • Hearing loss Sister      Social History     Tobacco Use   • Smoking status: Former     Types: Cigarettes   • Smokeless tobacco: Never   Vaping Use   • Vaping status: Never Used   Substance and Sexual Activity   • Alcohol use: Yes     Comment: occ-weekly   • Drug use: Never   • Sexual activity: Not Currently     Current Outpatient Medications on File Prior to Visit   Medication Sig   • apixaban (Eliquis) 5 mg TAKE ONE TABLET BY MOUTH TWO TIMES A DAY   • aspirin 81 mg chewable tablet Chew 81 mg daily   • CALCIUM PO Take by mouth   • docusate sodium (COLACE) 100 mg capsule Take 1 capsule (100 mg total) by mouth 2 (two) times a day for 7 days   • Ferrous Sulfate (IRON PO) Take by mouth   • fluticasone (FLONASE) 50 mcg/act nasal spray INSTILL ONE SPRAY INTO EACH NOSTRIL DAILY FOR 14 DAYS   • Glutamine 500 MG CAPS Take by mouth every morning   • multivitamin (THERAGRAN) TABS Take 1 tablet by mouth daily   • nitroglycerin (NITROSTAT) 0.4 mg SL tablet PLACE ONE TABLET (0.4 MG TOTAL) UNDER THE TONGUE EVERY FIVE MINUTES AS NEEDED FOR CHEST PAIN   •  RABEprazole (ACIPHEX) 20 MG tablet Take 1 tablet (20 mg total) by mouth 2 (two) times a day before meals   • rosuvastatin (CRESTOR) 20 MG tablet Take 1 tablet (20 mg total) by mouth daily   • sacubitril-valsartan (Entresto) 24-26 MG TABS Take 1 tablet by mouth 2 (two) times a day (Patient not taking: Reported on 10/31/2024)   • vitamin B-12 (CYANOCOBALAMIN) 100 MCG TABS Take 50 mcg by mouth daily     Allergies   Allergen Reactions   • Other Anaphylaxis     [facility]NUT     Immunization History   Administered Date(s) Administered   • COVID-19 MODERNA VACC 0.5 ML IM 02/04/2021, 03/04/2021, 12/23/2021   • Influenza, high dose seasonal 0.7 mL 11/02/2021, 11/17/2022   • Influenza, seasonal, injectable 01/14/2011, 12/09/2011, 10/11/2017   • Pneumococcal Conjugate Vaccine 20-valent (Pcv20), Polysace 01/16/2023   • Pneumococcal Polysaccharide PPV23 08/05/2021   • Td (adult), adsorbed 04/30/2010     Objective   There were no vitals taken for this visit.    Physical Exam  Constitutional:       Appearance: He is well-developed.   HENT:      Head: Normocephalic and atraumatic.      Right Ear: External ear normal.      Left Ear: External ear normal.      Nose: Nose normal.      Mouth/Throat:      Pharynx: Oropharynx is clear. No oropharyngeal exudate.   Eyes:      General: No scleral icterus.     Conjunctiva/sclera: Conjunctivae normal.      Pupils: Pupils are equal, round, and reactive to light.   Cardiovascular:      Rate and Rhythm: Normal rate and regular rhythm.      Heart sounds: Normal heart sounds.   Pulmonary:      Effort: Pulmonary effort is normal.      Breath sounds: Normal breath sounds. No wheezing or rales.   Abdominal:      General: Bowel sounds are normal.      Palpations: Abdomen is soft.      Tenderness: There is no abdominal tenderness. There is no guarding.   Musculoskeletal:         General: Normal range of motion.      Cervical back: Normal range of motion and neck supple.   Skin:     General: Skin is warm and  dry.      Findings: No erythema or rash.   Neurological:      Mental Status: He is alert and oriented to person, place, and time. Mental status is at baseline.   Psychiatric:         Mood and Affect: Mood normal.         Behavior: Behavior normal.         Thought Content: Thought content normal.         Judgment: Judgment normal.

## 2024-11-14 NOTE — PROGRESS NOTES
Name: Prince Branch      : 1945      MRN: [id number]  Encounter Provider: Jeremías Gibbons MD  Encounter Date: 2024   Encounter department: [facility]    Assessment & Plan       Preventive health issues were discussed with patient, and age appropriate screening tests were ordered as noted in patient's After Visit Summary. Personalized health advice and appropriate referrals for health education or preventive services given if needed, as noted in patient's After Visit Summary.    History of Present Illness     HPI   Patient Care Team:  Jeremías Gibbons MD as PCP - General (Family Medicine)    Review of Systems  Medical History Reviewed by provider this encounter:       Annual Wellness Visit Questionnaire   Prince is here for his Subsequent Wellness visit. Last Medicare Wellness visit information reviewed, patient interviewed and updates made to the record today.      Health Risk Assessment:   Patient rates overall health as good. Patient feels that their physical health rating is same. Patient is satisfied with their life. Eyesight was rated as same. Hearing was rated as slightly worse. Patient feels that their emotional and mental health rating is same. Patients states they are never, rarely angry. Patient states they are sometimes unusually tired/fatigued. Pain experienced in the last 7 days has been some. Patient's pain rating has been 4/10. Patient states that he has experienced no weight loss or gain in last 6 months.     Fall Risk Screening:   In the past year, patient has experienced: history of falling in past year    Number of falls: 2 or more  Injured during fall?: Yes    Feels unsteady when standing or walking?: No    Worried about falling?: No      Home Safety:  Patient does not have trouble with stairs inside or outside of their home. Patient has working smoke alarms and has working carbon monoxide detector. Home safety hazards include: none.     Nutrition:    Current diet is Regular.     Medications:   Patient is not currently taking any over-the-counter supplements. Patient is not able to manage medications.     Activities of Daily Living (ADLs)/Instrumental Activities of Daily Living (IADLs):   Walk and transfer into and out of bed and chair?: Yes  Dress and groom yourself?: Yes    Bathe or shower yourself?: Yes    Feed yourself? Yes  Do your laundry/housekeeping?: Yes  Manage your money, pay your bills and track your expenses?: Yes  Make your own meals?: Yes    Do your own shopping?: Yes    Previous Hospitalizations:   Any hospitalizations or ED visits within the last 12 months?: No      Advance Care Planning:   Living will: Yes    Advanced directive: Yes      PREVENTIVE SCREENINGS      Cardiovascular Screening:    General: Screening Not Indicated and History Lipid Disorder      Diabetes Screening:     General: Screening Current      Prostate Cancer Screening:    General: Screening Not Indicated      Osteoporosis Screening:    General: Screening Not Indicated and History Osteoporosis      Abdominal Aortic Aneurysm (AAA) Screening:    Risk factors include: tobacco use        Lung Cancer Screening:     General: Screening Not Indicated      Hepatitis C Screening:    General: Screening Current    Screening, Brief Intervention, and Referral to Treatment (SBIRT)    Screening  Typical number of drinks in a day: 0  Typical number of drinks in a week: 1  Interpretation: Low risk drinking behavior.    SDOH Risk Assessment  Social determinants of health (SDOH) risk assesment tool was completed. The tool at a minimum covered housing stability, food insecurity, transportation needs, and utility difficulty. Patient had at risk responses for the following SDOH domains: housing stability.     Social Drivers of Health     Financial Resource Strain: Low Risk  (7/6/2023)    [facility] from [facility]    Overall Financial Resource Strain (CARDIA)    • Difficulty of Paying  Living Expenses: Not hard at all   Food Insecurity: Unknown (7/6/2023)    Received from [employer]    Food Insecurity    • Within the past 12 months, you worried that your food would run out before you got the money to buy more.: 1   Transportation Needs: No Transportation Needs (7/9/2023)    Received from [employer]    Transportation Needs    • In the past 12 months, has lack of transportation kept you from medical appointments or from getting medications?: 2    • In the past 12 months, has lack of transportation kept you from meetings, work, or from getting things needed for daily living?: 2    Received from [employer]    [facility]     No results found.    Objective   There were no vitals taken for this visit.    Physical Exam

## 2024-11-22 DIAGNOSIS — I48.0 PAF (PAROXYSMAL ATRIAL FIBRILLATION) (HCC): ICD-10-CM

## 2024-11-22 RX ORDER — APIXABAN 5 MG/1
5 TABLET, FILM COATED ORAL 2 TIMES DAILY
Qty: 180 TABLET | Refills: 1 | Status: SHIPPED | OUTPATIENT
Start: 2024-11-22

## 2024-12-03 DIAGNOSIS — J30.1 SEASONAL ALLERGIC RHINITIS DUE TO POLLEN: ICD-10-CM

## 2024-12-04 RX ORDER — FLUTICASONE PROPIONATE 50 MCG
SPRAY, SUSPENSION (ML) NASAL
Qty: 16 G | Refills: 1 | Status: SHIPPED | OUTPATIENT
Start: 2024-12-04

## 2024-12-12 DIAGNOSIS — I42.8 NON-ISCHEMIC CARDIOMYOPATHY (HCC): ICD-10-CM

## 2024-12-12 RX ORDER — SACUBITRIL AND VALSARTAN 24; 26 MG/1; MG/1
1 TABLET, FILM COATED ORAL 2 TIMES DAILY
Qty: 180 TABLET | Refills: 1 | Status: SHIPPED | OUTPATIENT
Start: 2024-12-12

## 2025-01-22 ENCOUNTER — REMOTE DEVICE CLINIC VISIT (OUTPATIENT)
Dept: CARDIOLOGY CLINIC | Facility: CLINIC | Age: 80
End: 2025-01-22
Payer: MEDICARE

## 2025-01-22 ENCOUNTER — RESULTS FOLLOW-UP (OUTPATIENT)
Dept: CARDIOLOGY CLINIC | Facility: CLINIC | Age: 80
End: 2025-01-22

## 2025-01-22 DIAGNOSIS — Z95.0 CARDIAC PACEMAKER IN SITU: Primary | ICD-10-CM

## 2025-01-22 PROCEDURE — 93294 REM INTERROG EVL PM/LDLS PM: CPT | Performed by: INTERNAL MEDICINE

## 2025-01-22 PROCEDURE — 93296 REM INTERROG EVL PM/IDS: CPT | Performed by: INTERNAL MEDICINE

## 2025-01-22 NOTE — PROGRESS NOTES
Results for orders placed or performed in visit on 01/22/25   Cardiac EP device report    Narrative    SJM DC PM/ACTIVE SYSTEM IS MRI CONDITIONAL  MERLIN TRANSMISSION: BATTERY VOLTAGE ADEQUATE (2.8 YRS). AP-63%, -73% (>40% DDDR@60PPM). ALL AVAILABLE LEAD PARAMETERS WITHIN NORMAL LIMITS. 1 AMS EPISODE LASTING 6 SEC- PAT ON EGM. PT ON ELIQUIS, ASA 81MG & ENTRESTO. 1 MAGNET RESPONSE EPISODE ON 11/23/24. NORMAL DEVICE FUNCTION. GV

## 2025-01-27 ENCOUNTER — ANESTHESIA EVENT (OUTPATIENT)
Dept: GASTROENTEROLOGY | Facility: HOSPITAL | Age: 80
End: 2025-01-27
Payer: MEDICARE

## 2025-01-27 ENCOUNTER — HOSPITAL ENCOUNTER (OUTPATIENT)
Dept: GASTROENTEROLOGY | Facility: HOSPITAL | Age: 80
Setting detail: OUTPATIENT SURGERY
Discharge: HOME | End: 2025-01-27
Payer: MEDICARE

## 2025-01-27 ENCOUNTER — ANESTHESIA (OUTPATIENT)
Dept: GASTROENTEROLOGY | Facility: HOSPITAL | Age: 80
End: 2025-01-27
Payer: MEDICARE

## 2025-01-27 VITALS
OXYGEN SATURATION: 98 % | HEIGHT: 73 IN | SYSTOLIC BLOOD PRESSURE: 137 MMHG | HEART RATE: 67 BPM | RESPIRATION RATE: 16 BRPM | WEIGHT: 225 LBS | TEMPERATURE: 97 F | BODY MASS INDEX: 29.82 KG/M2 | DIASTOLIC BLOOD PRESSURE: 80 MMHG

## 2025-01-27 DIAGNOSIS — K21.9 GASTROESOPHAGEAL REFLUX DISEASE WITHOUT ESOPHAGITIS: ICD-10-CM

## 2025-01-27 PROCEDURE — 43239 EGD BIOPSY SINGLE/MULTIPLE: CPT | Performed by: INTERNAL MEDICINE

## 2025-01-27 PROCEDURE — 88313 SPECIAL STAINS GROUP 2: CPT | Performed by: PATHOLOGY

## 2025-01-27 PROCEDURE — 88305 TISSUE EXAM BY PATHOLOGIST: CPT | Performed by: PATHOLOGY

## 2025-01-27 RX ORDER — PROPOFOL 10 MG/ML
INJECTION, EMULSION INTRAVENOUS AS NEEDED
Status: DISCONTINUED | OUTPATIENT
Start: 2025-01-27 | End: 2025-01-27

## 2025-01-27 RX ORDER — LIDOCAINE HYDROCHLORIDE 10 MG/ML
INJECTION, SOLUTION EPIDURAL; INFILTRATION; INTRACAUDAL; PERINEURAL AS NEEDED
Status: DISCONTINUED | OUTPATIENT
Start: 2025-01-27 | End: 2025-01-27

## 2025-01-27 RX ORDER — SODIUM CHLORIDE, SODIUM LACTATE, POTASSIUM CHLORIDE, CALCIUM CHLORIDE 600; 310; 30; 20 MG/100ML; MG/100ML; MG/100ML; MG/100ML
INJECTION, SOLUTION INTRAVENOUS CONTINUOUS PRN
Status: DISCONTINUED | OUTPATIENT
Start: 2025-01-27 | End: 2025-01-27

## 2025-01-27 RX ADMIN — PROPOFOL 20 MG: 10 INJECTION, EMULSION INTRAVENOUS at 10:05

## 2025-01-27 RX ADMIN — SODIUM CHLORIDE, SODIUM LACTATE, POTASSIUM CHLORIDE, AND CALCIUM CHLORIDE: .6; .31; .03; .02 INJECTION, SOLUTION INTRAVENOUS at 09:55

## 2025-01-27 RX ADMIN — PROPOFOL 20 MG: 10 INJECTION, EMULSION INTRAVENOUS at 10:02

## 2025-01-27 RX ADMIN — PROPOFOL 90 MG: 10 INJECTION, EMULSION INTRAVENOUS at 09:59

## 2025-01-27 RX ADMIN — PROPOFOL 20 MG: 10 INJECTION, EMULSION INTRAVENOUS at 10:00

## 2025-01-27 RX ADMIN — LIDOCAINE HYDROCHLORIDE 50 MG: 10 INJECTION, SOLUTION EPIDURAL; INFILTRATION; INTRACAUDAL at 09:59

## 2025-01-27 NOTE — H&P
"History and Physical -  [facility]  Prince Branch 79 y.o. male MRN: [id number]    HPI: Prince Branch is a 79 y.o. year old male who presents for evaluation of GERD symptoms.      Review of Systems    Historical Information   Past Medical History:   Diagnosis Date    Bradycardia     permanent pacemaker    Chest pain     Colon polyp     Coronary artery disease 2016    GERD (gastroesophageal reflux disease)     Hearing aid worn     Hiatal hernia     High cholesterol     Hypertension      Past Surgical History:   Procedure Laterality Date    CARDIAC CATHETERIZATION Left 4/5/2023    Procedure: Cardiac Left Heart Cath;  Surgeon: Alexis Brower MD;  [facility];  Service: Cardiology    CARDIAC PACEMAKER PLACEMENT  2017    sees cardiologist in [place]- last visit was 5/2020 and has a monitor to check pacemaker    CARDIAC PACEMAKER PLACEMENT      COLONOSCOPY      CYSTOSCOPY  2018    HERNIA REPAIR      NE RPR AA HERNIA 1ST < 3 CM REDUCIBLE N/A 5/8/2024    Procedure: REPAIR OF UMBILICAL HERIA;  Surgeon: Brian Gama MD;  Location:  MAIN OR;  Service: General    NE RPR RECRT INGUINAL HERNIA ANY AGE REDUCIBLE Left 12/11/2020    Procedure: REPAIR HERNIA INGUINAL;  Surgeon: Brian Gama MD;  Location: [facility];  Service: General     Social History   Social History     Substance and Sexual Activity   Alcohol Use Yes    Comment: occ-weekly     Social History     Substance and Sexual Activity   Drug Use Never     Social History     Tobacco Use   Smoking Status Former    Types: Cigarettes   Smokeless Tobacco Never     Family History   Problem Relation Age of Onset    Cancer Mother     Colon cancer Mother     Breast cancer Sister 82    Hearing loss Sister        Meds/Allergies     Not in a hospital admission.    Allergies   Allergen Reactions    Other Anaphylaxis     WALNUT       Objective     /81   Pulse 63   Temp (!) 96.9 °F (36.1 °C) (Temporal)   Resp 15   Ht 6' 1\" " (1.854 m)   Wt 102 kg (225 lb)   SpO2 98%   BMI 29.69 kg/m²       PHYSICAL EXAM    Gen: NAD  CV: RRR  CHEST: Clear  ABD: soft, NT/ND  EXT: no edema  Neuro: AAO      ASSESSMENT/PLAN:  This is a 79 y.o. year old male here for evaluation of GERD symptoms.    PLAN:   Procedure: EGD.

## 2025-01-27 NOTE — ANESTHESIA PREPROCEDURE EVALUATION
Procedure:  EGD    Relevant Problems   GI/HEPATIC   (+) Gastroesophageal reflux disease     Stress test showed small-moderate reversible myocardial ischemia and patient was evaluated by cardiology after this finding. Conclusion was to continue to monitor as symptoms (SOB) were mild with ok exercise tolerance      Last device interrogation (2/2022)   [facility] dual PM. A-paced (50%) and V-paced (50%) A-fib burden less than 1%      Stress ECHO (6/2021)  SUMMARY:  -  Rest ECG: AV sequential pacing  -  Stress results: There was no chest pain during stress.  -  ECG conclusions: The stress ECG was non-diagnostic due to demand paced rhythm and baseline ST/T wave changes  -  Perfusion imaging: There was a small to moderate, moderately severe, partially reversible myocardial perfusion defect of apical , inferolateral and basal inferoseptal wall  -  Gated SPECT: The calculated left ventricular ejection fraction was 45 %. Left ventricular ejection fraction was mildly decreased by visual estimate. There was mildly reduced myocardial thickening and motion of apical and inferoseptal  wall     IMPRESSIONS: Abnormal study after pharmacologic vasodilation without reproduction of symptoms.  Stress ekg non diagnostic due to baseline ST/T wave changes and demand ventricular pacing There was a small to moderate, moderately severe, partially reversible myocardial perfusion defect of apical , inferolateral and basal inferoseptal  wall suggestive of ischemia  LV systolic function is mildly reduced with regional wall motion abnormalities in apical and inferoseptal wall     Physical Exam    Airway  Comment: Small mouth  Mallampati score: II  TM Distance: <3 FB  Neck ROM: full     Dental   No notable dental hx     Cardiovascular      Pulmonary      Other Findings        Anesthesia Plan  ASA Score- 3     Anesthesia Type- IV sedation with anesthesia with ASA Monitors.         Additional Monitors:     Airway Plan:            Plan  Factors-Exercise tolerance (METS): <4 METS.Exercise comment: Hernia, mild SOB with exertion.    Chart reviewed.    Patient summary reviewed.    Patient is not a current smoker.              Induction- intravenous.    Postoperative Plan-         Informed Consent- Anesthetic plan and risks discussed with patient.  I personally reviewed this patient with the CRNA. Discussed and agreed on the Anesthesia Plan with the CRNA..

## 2025-01-27 NOTE — ANESTHESIA POSTPROCEDURE EVALUATION
Post-Op Assessment Note    CV Status:  Stable    Pain management: adequate       Mental Status:  Alert and awake   Hydration Status:  Euvolemic   PONV Controlled:  Controlled   Airway Patency:  Patent     Post Op Vitals Reviewed: Yes    No anethesia notable event occurred.    Staff: CRNA           Last Filed PACU Vitals:  Vitals Value Taken Time   Temp 97 °F (36.1 °C) 01/27/25 1011   Pulse 60 01/27/25 1011   /67 01/27/25 1011   Resp 16 01/27/25 1011   SpO2 95 % 01/27/25 1011       Modified Cate:     Vitals Value Taken Time   Activity 2 01/27/25 1011   Respiration 2 01/27/25 1011   Circulation 2 01/27/25 1011   Consciousness 1 01/27/25 1011   Oxygen Saturation 2 01/27/25 1011     Modified Cate Score: 9

## 2025-01-28 DIAGNOSIS — J30.1 SEASONAL ALLERGIC RHINITIS DUE TO POLLEN: ICD-10-CM

## 2025-01-29 RX ORDER — FLUTICASONE PROPIONATE 50 MCG
SPRAY, SUSPENSION (ML) NASAL
Qty: 16 G | Refills: 1 | Status: SHIPPED | OUTPATIENT
Start: 2025-01-29

## 2025-01-30 PROCEDURE — 88313 SPECIAL STAINS GROUP 2: CPT | Performed by: PATHOLOGY

## 2025-01-30 PROCEDURE — 88305 TISSUE EXAM BY PATHOLOGIST: CPT | Performed by: PATHOLOGY

## 2025-02-04 ENCOUNTER — RESULTS FOLLOW-UP (OUTPATIENT)
Dept: GASTROENTEROLOGY | Facility: AMBULARY SURGERY CENTER | Age: 80
End: 2025-02-04

## 2025-02-05 NOTE — TELEPHONE ENCOUNTER
----- Message from Celina Sagastume MD sent at 2/4/2025 11:52 AM EST -----  Please inform the patient that there is no evidence of celiac disease or H. pylori bacteria.  While there is no evidence of Urban's on biopsies, given endoscopic appearance I would recommend continuing Aciphex and repeating EGD in 5 years.

## 2025-02-05 NOTE — TELEPHONE ENCOUNTER
Attempted to call patient regarding EGD results & provider recommendations, however, I received voicemail. Advised to return call to office to discuss. 5 year EGD recall set.    Please, relay results if patient returns call. Thank you!

## 2025-02-07 ENCOUNTER — RA CDI HCC (OUTPATIENT)
Dept: OTHER | Facility: HOSPITAL | Age: 80
End: 2025-02-07

## 2025-02-07 NOTE — TELEPHONE ENCOUNTER
Attempted to call patient regarding EGD results & provider recommendations, however, I received voicemail. Advised to return call to office to discuss. 5 year EGD recall set. Second attempt letter sent.     Please, relay results if patient returns call. Thank you!

## 2025-02-10 NOTE — TELEPHONE ENCOUNTER
Spoke with pt, informed of biopsy results and recommendations per provider. All questions answered.

## 2025-02-14 ENCOUNTER — OFFICE VISIT (OUTPATIENT)
Dept: FAMILY MEDICINE CLINIC | Facility: CLINIC | Age: 80
End: 2025-02-14
Payer: MEDICARE

## 2025-02-14 VITALS
BODY MASS INDEX: 31.28 KG/M2 | WEIGHT: 236 LBS | TEMPERATURE: 97.2 F | HEIGHT: 73 IN | OXYGEN SATURATION: 98 % | SYSTOLIC BLOOD PRESSURE: 110 MMHG | HEART RATE: 62 BPM | RESPIRATION RATE: 18 BRPM | DIASTOLIC BLOOD PRESSURE: 60 MMHG

## 2025-02-14 DIAGNOSIS — E78.5 HYPERLIPIDEMIA, UNSPECIFIED HYPERLIPIDEMIA TYPE: ICD-10-CM

## 2025-02-14 DIAGNOSIS — K21.9 GASTROESOPHAGEAL REFLUX DISEASE WITHOUT ESOPHAGITIS: ICD-10-CM

## 2025-02-14 DIAGNOSIS — I42.9 CARDIOMYOPATHY, UNSPECIFIED TYPE (HCC): Primary | ICD-10-CM

## 2025-02-14 DIAGNOSIS — I48.0 PAROXYSMAL ATRIAL FIBRILLATION (HCC): ICD-10-CM

## 2025-02-14 DIAGNOSIS — M81.0 OSTEOPOROSIS WITHOUT CURRENT PATHOLOGICAL FRACTURE, UNSPECIFIED OSTEOPOROSIS TYPE: ICD-10-CM

## 2025-02-14 PROCEDURE — G2211 COMPLEX E/M VISIT ADD ON: HCPCS | Performed by: FAMILY MEDICINE

## 2025-02-14 PROCEDURE — 99214 OFFICE O/P EST MOD 30 MIN: CPT | Performed by: FAMILY MEDICINE

## 2025-02-14 RX ORDER — ROSUVASTATIN CALCIUM 20 MG/1
20 TABLET, COATED ORAL DAILY
Qty: 90 TABLET | Refills: 1 | Status: SHIPPED | OUTPATIENT
Start: 2025-02-14

## 2025-02-14 RX ORDER — ROSUVASTATIN CALCIUM 20 MG/1
20 TABLET, COATED ORAL DAILY
Qty: 90 TABLET | Refills: 1 | Status: SHIPPED | OUTPATIENT
Start: 2025-02-14 | End: 2025-02-14

## 2025-02-14 NOTE — ASSESSMENT & PLAN NOTE
Patient to continue with weight bearing exercises as much as possible and oral intake of 1200 mg of calcium with 800 IU of Vit D to maximize bone protection. Pt  to continue  with DEXA scan every 2 years to reassess. All qustions regarding this problem answered today to patient satisfaction.

## 2025-02-14 NOTE — PROGRESS NOTES
Name: Prince Branch      : 1945      MRN: [id number]  Encounter Provider: Jeremías Gibbons MD  Encounter Date: 2025   Encounter department: [facility]  :  Assessment & Plan  Cardiomyopathy, unspecified type (HCC)  Patient is stable  and will continue present plan of care and reassess at next routine visit. All questions about this problem from patient were answered today.        Osteoporosis without current pathological fracture, unspecified osteoporosis type  Patient to continue with weight bearing exercises as much as possible and oral intake of 1200 mg of calcium with 800 IU of Vit D to maximize bone protection. Pt  to continue  with DEXA scan every 2 years to reassess. All qustions regarding this problem answered today to patient satisfaction.        Gastroesophageal reflux disease without esophagitis  Patient to continue with present therapy and decrease caffeine, avoid ETOH and smoking to decrease acid production. Pt should also cease eating prior to bedtime and avoid excessive fluid intake prior to sleep. May use antacids as needed for breakthrough GERD. All pateint questions answered today about this condition.        Hyperlipidemia, unspecified hyperlipidemia type    Orders:  •  rosuvastatin (CRESTOR) 20 MG tablet; Take 1 tablet (20 mg total) by mouth daily  •  Comprehensive metabolic panel; Future  •  Lipid Panel with Direct LDL reflex; Future    Paroxysmal atrial fibrillation (HCC)          Falls Plan of Care: balance, strength, and gait training instructions were provided. Home safety education provided.     History of Present Illness   79-year-old male here today for checkup on multimedical problems patient with GERD nonischemic cardiomyopathy hyperlipidemia and is doing well.  Patient has refills on his Crestor and is doing well with his reflux that he recently had an EGD and is doing well with that the patient is scheduled to see his cardiologist in this  "upcoming summer.  We will see him in August after he sees his cardiologist.  Patient blood pressure is well-controlled and he is following a good diet.      Review of Systems   Constitutional:  Negative for activity change, appetite change, chills, fatigue, fever and unexpected weight change.   HENT:  Negative for congestion, ear pain, hearing loss, mouth sores, postnasal drip, sinus pressure, sinus pain, sneezing and sore throat.    Respiratory:  Negative for apnea, cough, shortness of breath and wheezing.    Cardiovascular:  Negative for chest pain, palpitations and leg swelling.   Gastrointestinal:  Negative for abdominal pain, constipation, diarrhea, nausea and vomiting.   Endocrine: Negative for cold intolerance and heat intolerance.   Genitourinary:  Negative for dysuria, frequency and hematuria.   Musculoskeletal:  Negative for arthralgias, back pain, gait problem, joint swelling and neck pain.   Skin:  Negative for rash.   Neurological:  Negative for dizziness, weakness and numbness.   Hematological:  Does not bruise/bleed easily.   Psychiatric/Behavioral:  Negative for agitation, behavioral problems, confusion, hallucinations and sleep disturbance. The patient is not nervous/anxious.        Objective   /60 (BP Location: Left arm, Patient Position: Sitting, Cuff Size: Large)   Pulse 62   Temp (!) 97.2 °F (36.2 °C) (Temporal)   Resp 18   Ht 6' 1\" (1.854 m)   Wt 107 kg (236 lb)   SpO2 98%   BMI 31.14 kg/m²      Physical Exam  Constitutional:       Appearance: He is well-developed.   HENT:      Head: Normocephalic and atraumatic.      Right Ear: External ear normal.      Left Ear: External ear normal.      Nose: Nose normal.      Mouth/Throat:      Pharynx: Oropharynx is clear. No oropharyngeal exudate.   Eyes:      General: No scleral icterus.     Conjunctiva/sclera: Conjunctivae normal.      Pupils: Pupils are equal, round, and reactive to light.   Cardiovascular:      Rate and Rhythm: Normal " rate and regular rhythm.      Heart sounds: Normal heart sounds.   Pulmonary:      Effort: Pulmonary effort is normal.      Breath sounds: Normal breath sounds. No wheezing or rales.   Abdominal:      General: Bowel sounds are normal.      Palpations: Abdomen is soft.      Tenderness: There is no abdominal tenderness. There is no guarding.   Musculoskeletal:         General: Normal range of motion.      Cervical back: Normal range of motion and neck supple.   Skin:     General: Skin is warm and dry.      Findings: No erythema or rash.   Neurological:      Mental Status: He is alert and oriented to person, place, and time. Mental status is at baseline.   Psychiatric:         Mood and Affect: Mood normal.         Behavior: Behavior normal.         Thought Content: Thought content normal.         Judgment: Judgment normal.

## 2025-02-19 DIAGNOSIS — I48.0 PAF (PAROXYSMAL ATRIAL FIBRILLATION) (HCC): ICD-10-CM

## 2025-02-21 ENCOUNTER — TELEPHONE (OUTPATIENT)
Age: 80
End: 2025-02-21

## 2025-02-21 RX ORDER — APIXABAN 5 MG/1
5 TABLET, FILM COATED ORAL 2 TIMES DAILY
Qty: 180 TABLET | Refills: 1 | Status: SHIPPED | OUTPATIENT
Start: 2025-02-21

## 2025-02-21 NOTE — TELEPHONE ENCOUNTER
Patient called stating his cardiology appt got bumped up to April 30th and patient wanted to know if he should be getting his bloodwork done before that date. Patient stated he also forgot to mention at his last visit that he's been experiencing a lot of exhaustion/fatigue. He mentioned that everyday around noon, he will lay down for about 2 hours and nap. Please call patient back.

## 2025-02-21 NOTE — TELEPHONE ENCOUNTER
Call patient.  He can get the lab work done anytime between now and a cardiologist appointment.  Would want to see the results before we discuss anything about his fatigue will if we should see him for the fatigue after the lab work is done if it still continuing.

## 2025-02-25 ENCOUNTER — APPOINTMENT (OUTPATIENT)
Dept: LAB | Facility: MEDICAL CENTER | Age: 80
End: 2025-02-25
Payer: MEDICARE

## 2025-02-25 DIAGNOSIS — E78.5 HYPERLIPIDEMIA, UNSPECIFIED HYPERLIPIDEMIA TYPE: ICD-10-CM

## 2025-02-25 LAB
ALBUMIN SERPL BCG-MCNC: 4.2 G/DL (ref 3.5–5)
ALP SERPL-CCNC: 60 U/L (ref 34–104)
ALT SERPL W P-5'-P-CCNC: 17 U/L (ref 7–52)
ANION GAP SERPL CALCULATED.3IONS-SCNC: 9 MMOL/L (ref 4–13)
AST SERPL W P-5'-P-CCNC: 22 U/L (ref 13–39)
BILIRUB SERPL-MCNC: 0.64 MG/DL (ref 0.2–1)
BUN SERPL-MCNC: 17 MG/DL (ref 5–25)
CALCIUM SERPL-MCNC: 9.3 MG/DL (ref 8.4–10.2)
CHLORIDE SERPL-SCNC: 100 MMOL/L (ref 96–108)
CHOLEST SERPL-MCNC: 144 MG/DL (ref ?–200)
CO2 SERPL-SCNC: 27 MMOL/L (ref 21–32)
CREAT SERPL-MCNC: 0.87 MG/DL (ref 0.6–1.3)
GFR SERPL CREATININE-BSD FRML MDRD: 83 ML/MIN/1.73SQ M
GLUCOSE P FAST SERPL-MCNC: 104 MG/DL (ref 65–99)
HDLC SERPL-MCNC: 64 MG/DL
LDLC SERPL CALC-MCNC: 62 MG/DL (ref 0–100)
POTASSIUM SERPL-SCNC: 4.4 MMOL/L (ref 3.5–5.3)
PROT SERPL-MCNC: 7 G/DL (ref 6.4–8.4)
SODIUM SERPL-SCNC: 136 MMOL/L (ref 135–147)
TRIGL SERPL-MCNC: 92 MG/DL (ref ?–150)

## 2025-02-25 PROCEDURE — 80053 COMPREHEN METABOLIC PANEL: CPT

## 2025-02-25 PROCEDURE — 80061 LIPID PANEL: CPT

## 2025-02-25 PROCEDURE — 36415 COLL VENOUS BLD VENIPUNCTURE: CPT

## 2025-02-27 ENCOUNTER — RESULTS FOLLOW-UP (OUTPATIENT)
Dept: FAMILY MEDICINE CLINIC | Facility: CLINIC | Age: 80
End: 2025-02-27

## 2025-03-11 ENCOUNTER — NURSE TRIAGE (OUTPATIENT)
Age: 80
End: 2025-03-11

## 2025-03-11 NOTE — TELEPHONE ENCOUNTER
"FOLLOW UP: Please advise. Advised to contact PCP or Cardiologist.  Pt wanted GI provider's recommendation also.     REASON FOR CONVERSATION: chest tightness    SYMPTOMS: Chest tightness since EGD of and  on 1/27/25.  Feels like \"someone is sitting on chest\" at times.  Worse with activity- has to stop for a few minutes and then symptoms resolves.  Pt not currently having the symptoms.      OTHER: Advised that he should contact PCP or Cardiologist. Pt states he had neg workup one yr ago at [facility].  Advised that he should still make Cardiologist and PCP aware.      DISPOSITION: Discuss with Provider and Call Back Patient (overriding See Today in Office)Also recommend pt contact PCP and/or Cardiologist.      Reason for Disposition   Chest pain(s) lasting a few seconds persists > 3 days    Answer Assessment - Initial Assessment Questions  1. LOCATION: \"Where does it hurt?\"        Chest tightness  2. RADIATION: \"Does the pain go anywhere else?\" (e.g., into neck, jaw, arms, back)      Denies   3. ONSET: \"When did the chest pain begin?\" (Minutes, hours or days)       Since EGD 1/27/25  4. PATTERN: \"Does the pain come and go, or has it been constant since it started?\"  \"Does it get worse with exertion?\"       Intermittent   5. DURATION: \"How long does it last\" (e.g., seconds, minutes, hours)      A few minutes   6. SEVERITY: \"How bad is the pain?\"  (e.g., Scale 1-10; mild, moderate, or severe)      Lasts a few minutes, rests and then symptoms are improved.  7. CARDIAC RISK FACTORS: \"Do you have any history of heart problems or risk factors for heart disease?\" (e.g., angina, prior heart attack; diabetes, high blood pressure, high cholesterol, smoker, or strong family history of heart disease)      Yes -PAF and cardiomyopathy  8. PULMONARY RISK FACTORS: \"Do you have any history of lung disease?\"  (e.g., blood clots in lung, asthma, emphysema, birth control pills)      no  9. CAUSE: \"What do you think is causing the chest " "pain?\"      Unsure   10. OTHER SYMPTOMS: \"Do you have any other symptoms?\" (e.g., dizziness, nausea, vomiting, sweating, fever, difficulty breathing, cough)        Denies    Protocols used: Chest Pain-Adult-[place]    "

## 2025-03-14 ENCOUNTER — TELEPHONE (OUTPATIENT)
Age: 80
End: 2025-03-14

## 2025-03-14 DIAGNOSIS — J11.1 INFLUENZA: Primary | ICD-10-CM

## 2025-03-14 RX ORDER — OSELTAMIVIR PHOSPHATE 75 MG/1
75 CAPSULE ORAL EVERY 12 HOURS SCHEDULED
Qty: 10 CAPSULE | Refills: 0 | Status: SHIPPED | OUTPATIENT
Start: 2025-03-14 | End: 2025-03-19

## 2025-03-14 NOTE — TELEPHONE ENCOUNTER
Patient called in stated having Flu like symptoms and would like to see Dr. Gibbons  today. No available appointments.Patient would like to have Tamiflu sent of pharmacy. Please advise. Thank you.

## 2025-03-18 ENCOUNTER — OFFICE VISIT (OUTPATIENT)
Dept: URGENT CARE | Facility: MEDICAL CENTER | Age: 80
End: 2025-03-18
Payer: MEDICARE

## 2025-03-18 ENCOUNTER — APPOINTMENT (OUTPATIENT)
Dept: URGENT CARE | Facility: MEDICAL CENTER | Age: 80
End: 2025-03-18
Payer: MEDICARE

## 2025-03-18 ENCOUNTER — APPOINTMENT (OUTPATIENT)
Dept: RADIOLOGY | Facility: MEDICAL CENTER | Age: 80
End: 2025-03-18
Payer: MEDICARE

## 2025-03-18 VITALS
OXYGEN SATURATION: 97 % | HEIGHT: 73 IN | TEMPERATURE: 97.3 F | HEART RATE: 71 BPM | WEIGHT: 231 LBS | RESPIRATION RATE: 18 BRPM | BODY MASS INDEX: 30.62 KG/M2 | DIASTOLIC BLOOD PRESSURE: 51 MMHG | SYSTOLIC BLOOD PRESSURE: 113 MMHG

## 2025-03-18 DIAGNOSIS — R05.1 ACUTE COUGH: ICD-10-CM

## 2025-03-18 DIAGNOSIS — R05.1 ACUTE COUGH: Primary | ICD-10-CM

## 2025-03-18 PROCEDURE — G0463 HOSPITAL OUTPT CLINIC VISIT: HCPCS | Performed by: PHYSICIAN ASSISTANT

## 2025-03-18 PROCEDURE — 71046 X-RAY EXAM CHEST 2 VIEWS: CPT

## 2025-03-18 PROCEDURE — 99213 OFFICE O/P EST LOW 20 MIN: CPT | Performed by: PHYSICIAN ASSISTANT

## 2025-03-18 RX ORDER — AZITHROMYCIN 250 MG/1
TABLET, FILM COATED ORAL
Qty: 6 TABLET | Refills: 0 | Status: SHIPPED | OUTPATIENT
Start: 2025-03-18 | End: 2025-03-22

## 2025-03-18 RX ORDER — BENZONATATE 100 MG/1
100 CAPSULE ORAL 3 TIMES DAILY PRN
Qty: 20 CAPSULE | Refills: 0 | Status: SHIPPED | OUTPATIENT
Start: 2025-03-18

## 2025-03-18 NOTE — PROGRESS NOTES
[facility]        NAME: Prince Branch is a 80 y.o. male  : 1945    MRN: [id number]  DATE: 2025  TIME: 3:41 PM    Assessment and Plan   Acute cough [R05.1]  1. Acute cough  XR chest pa and lateral            Patient Instructions     Cough  Zithromax as directed   Tessalon pearls as needed for cough  Follow up with PCP in 3-5 days.  Proceed to  ER if symptoms worsen.    Chief Complaint     Chief Complaint   Patient presents with    Cold Like Symptoms     Started 2 weeks ago with cough, sore throat, chills.  Last week PCP prescribed Oseltamivir phosphate and has one day left.  Still has cough and wheezing.  Has been taking over the counter cough syrup with no relief.         History of Present Illness       80-year-old male who presents complaining of cough, congestion, sore throat, chills x 2 weeks.  States that he has developed wheezing over the last 3 days.  Patient was seen by his PCP and given Tamiflu which he is finished with.  States the symptoms have not improved.  Denies chest pain.        Review of Systems   Review of Systems   Constitutional: Negative.    HENT:  Positive for congestion.    Eyes: Negative.    Respiratory:  Positive for cough. Negative for apnea, choking, chest tightness, shortness of breath, wheezing and stridor.    Cardiovascular: Negative.  Negative for chest pain.         Current Medications       Current Outpatient Medications:     aspirin 81 mg chewable tablet, Chew 81 mg daily, Disp: , Rfl:     CALCIUM PO, Take by mouth, Disp: , Rfl:     clotrimazole-betamethasone (LOTRISONE) 1-0.05 % cream, Apply topically 2 (two) times a day, Disp: 45 g, Rfl: 1    Eliquis 5 MG, TAKE ONE TABLET BY MOUTH TWO TIMES A DAY, Disp: 180 tablet, Rfl: 1    Ferrous Sulfate (IRON PO), Take by mouth, Disp: , Rfl:     fluticasone (FLONASE) 50 mcg/act nasal spray, INSTILL ONE SPRAY INTO EACH NOSTRIL DAILY FOR 14 DAYS, Disp: 16 g, Rfl: 1    Glutamine 500 MG CAPS, Take by mouth every  morning, Disp: , Rfl:     multivitamin (THERAGRAN) TABS, Take 1 tablet by mouth daily, Disp: , Rfl:     nitroglycerin (NITROSTAT) 0.4 mg SL tablet, PLACE ONE TABLET (0.4 MG TOTAL) UNDER THE TONGUE EVERY FIVE MINUTES AS NEEDED FOR CHEST PAIN, Disp: 25 tablet, Rfl: 1    oseltamivir (TAMIFLU) 75 mg capsule, Take 1 capsule (75 mg total) by mouth every 12 (twelve) hours for 5 days, Disp: 10 capsule, Rfl: 0    RABEprazole (ACIPHEX) 20 MG tablet, Take 1 tablet (20 mg total) by mouth 2 (two) times a day before meals, Disp: 60 tablet, Rfl: 11    rosuvastatin (CRESTOR) 20 MG tablet, Take 1 tablet (20 mg total) by mouth daily, Disp: 90 tablet, Rfl: 1    vitamin B-12 (CYANOCOBALAMIN) 100 MCG TABS, Take 50 mcg by mouth daily, Disp: , Rfl:     docusate sodium (COLACE) 100 mg capsule, Take 1 capsule (100 mg total) by mouth 2 (two) times a day for 7 days, Disp: 14 capsule, Rfl: 0    Current Allergies     Allergies as of 03/18/2025 - Reviewed 03/18/2025   Allergen Reaction Noted    Other Anaphylaxis 11/01/2021            The following portions of the patient's history were reviewed and updated as appropriate: allergies, current medications, past family history, past medical history, past social history, past surgical history and problem list.     Past Medical History:   Diagnosis Date    Bradycardia     permanent pacemaker    Chest pain     Colon polyp     Coronary artery disease 2016    GERD (gastroesophageal reflux disease)     Hearing aid worn     Hiatal hernia     High cholesterol     Hypertension        Past Surgical History:   Procedure Laterality Date    CARDIAC CATHETERIZATION Left 4/5/2023    Procedure: Cardiac Left Heart Cath;  Surgeon: Alexis Brower MD;  [facility];  Service: Cardiology    CARDIAC PACEMAKER PLACEMENT  2017    sees cardiologist in [place]- last visit was 5/2020 and has a monitor to check pacemaker    CARDIAC PACEMAKER PLACEMENT      COLONOSCOPY      CYSTOSCOPY  2018    HERNIA REPAIR       "HI RPR AA HERNIA 1ST < 3 CM REDUCIBLE N/A 5/8/2024    Procedure: REPAIR OF UMBILICAL HERIA;  Surgeon: Brian Gama MD;  Location:  MAIN OR;  Service: General    HI RPR RECRT INGUINAL HERNIA ANY AGE REDUCIBLE Left 12/11/2020    Procedure: REPAIR HERNIA INGUINAL;  Surgeon: Brian Gama MD;  Location: [facility];  Service: General       Family History   Problem Relation Age of Onset    Cancer Mother     Colon cancer Mother     Breast cancer Sister 82    Hearing loss Sister          Medications have been verified.        Objective   /51   Pulse 71   Temp (!) 97.3 °F (36.3 °C)   Resp 18   Ht 6' 1\" (1.854 m)   Wt 105 kg (231 lb)   SpO2 97%   BMI 30.48 kg/m²        Physical Exam     Physical Exam  Constitutional:       General: He is not in acute distress.     Appearance: Normal appearance. He is well-developed. He is not diaphoretic.   HENT:      Head: Normocephalic and atraumatic.      Right Ear: Hearing, tympanic membrane, ear canal and external ear normal.      Left Ear: Hearing, tympanic membrane, ear canal and external ear normal.      Nose: Rhinorrhea present.      Right Sinus: No maxillary sinus tenderness or frontal sinus tenderness.      Left Sinus: No maxillary sinus tenderness or frontal sinus tenderness.      Mouth/Throat:      Pharynx: Uvula midline.   Cardiovascular:      Rate and Rhythm: Normal rate and regular rhythm.      Heart sounds: Normal heart sounds.   Pulmonary:      Effort: Pulmonary effort is normal. No respiratory distress.      Breath sounds: Normal breath sounds. No stridor. No wheezing, rhonchi or rales.   Chest:      Chest wall: No tenderness.   Musculoskeletal:      Cervical back: Normal range of motion and neck supple.   Lymphadenopathy:      Cervical: No cervical adenopathy.   Neurological:      Mental Status: He is alert.                   "

## 2025-03-18 NOTE — PATIENT INSTRUCTIONS
Cough  Zithromax as directed   Tessalon pearls as needed for cough  Follow up with PCP in 3-5 days.  Proceed to  ER if symptoms worsen.

## 2025-04-18 ENCOUNTER — OFFICE VISIT (OUTPATIENT)
Dept: FAMILY MEDICINE CLINIC | Facility: CLINIC | Age: 80
End: 2025-04-18
Payer: MEDICARE

## 2025-04-18 VITALS
SYSTOLIC BLOOD PRESSURE: 118 MMHG | TEMPERATURE: 97.7 F | BODY MASS INDEX: 30.48 KG/M2 | OXYGEN SATURATION: 98 % | HEART RATE: 60 BPM | DIASTOLIC BLOOD PRESSURE: 58 MMHG | WEIGHT: 230 LBS | HEIGHT: 73 IN

## 2025-04-18 DIAGNOSIS — K21.9 GASTROESOPHAGEAL REFLUX DISEASE WITHOUT ESOPHAGITIS: Primary | ICD-10-CM

## 2025-04-18 PROCEDURE — G2211 COMPLEX E/M VISIT ADD ON: HCPCS | Performed by: FAMILY MEDICINE

## 2025-04-18 PROCEDURE — 99213 OFFICE O/P EST LOW 20 MIN: CPT | Performed by: FAMILY MEDICINE

## 2025-04-18 RX ORDER — OMEPRAZOLE 40 MG/1
40 CAPSULE, DELAYED RELEASE ORAL DAILY
Start: 2025-04-18

## 2025-04-18 RX ORDER — METOPROLOL SUCCINATE 25 MG/1
25 TABLET, EXTENDED RELEASE ORAL EVERY 24 HOURS
COMMUNITY

## 2025-04-18 NOTE — PROGRESS NOTES
"Name: Prince Branch      : 1945      MRN: [id number]  Encounter Provider: Jeremías Gibbons MD  Encounter Date: 2025   Encounter department: [facility]  :  Assessment & Plan  Gastroesophageal reflux disease without esophagitis    Orders:  •  omeprazole (PriLOSEC) 40 MG capsule; Take 1 capsule (40 mg total) by mouth daily           History of Present Illness   -year-old male here today for checkup on cough that is still giving him troubles.  Patient was seen recently at the walk-in center was given a Z-Ronn with some Tessalon Perles and he states he still some cough.  Patient denies any chest pain or fever little bit of nasal discharge but he still has a cough that is worse at night especially when he lays down patient was taking omeprazole previously was changed to Aciphex and he says that he still has been using some heartburn medication in between I told him that he might want to see about going back to omeprazole in the place of the Aciphex because he did not have any cough with that I think he may have some reflux it is causing him to have his cough and he is going to see about changing back to omeprazole and we will see him back after he is on the omeprazole for about 4 5 weeks.      Review of Systems    Objective   /58 (BP Location: Left arm, Patient Position: Sitting, Cuff Size: Large)   Pulse 60   Temp 97.7 °F (36.5 °C) (Temporal)   Ht 6' 1\" (1.854 m)   Wt 104 kg (230 lb)   SpO2 98%   BMI 30.34 kg/m²      Physical Exam    "

## 2025-04-22 ENCOUNTER — RESULTS FOLLOW-UP (OUTPATIENT)
Dept: CARDIOLOGY CLINIC | Facility: CLINIC | Age: 80
End: 2025-04-22

## 2025-04-22 ENCOUNTER — IN-CLINIC DEVICE VISIT (OUTPATIENT)
Dept: CARDIOLOGY CLINIC | Facility: MEDICAL CENTER | Age: 80
End: 2025-04-22
Payer: MEDICARE

## 2025-04-22 DIAGNOSIS — Z95.0 CARDIAC PACEMAKER IN SITU: Primary | ICD-10-CM

## 2025-04-22 PROCEDURE — 93280 PM DEVICE PROGR EVAL DUAL: CPT | Performed by: INTERNAL MEDICINE

## 2025-04-22 NOTE — PROGRESS NOTES
Results for orders placed or performed in visit on 04/22/25   Cardiac EP device report    Narrative    [facility] DC PM/ACTIVE SYSTEM IS MRI CONDITIONAL  DEVICE INTERROGATED IN THE [place] OFFICE: PRESENTING EGRAM AP @ 60 BPM. BATTERY VOLTAGE ADEQUATE-2.5 YRS. AP 60%  71%. ALL AVAILABLE LEAD PARAMETERS WITHIN NORMAL LIMITS. NO SIGNIFICANT HIGH RATE EPISODES. NO PROGRAMMING CHANGES MADE TO DEVICE PARAMETERS. NORMAL DEVICE FUNCTION. NC

## 2025-04-30 ENCOUNTER — OFFICE VISIT (OUTPATIENT)
Dept: CARDIOLOGY CLINIC | Facility: MEDICAL CENTER | Age: 80
End: 2025-04-30
Payer: MEDICARE

## 2025-04-30 VITALS
OXYGEN SATURATION: 99 % | SYSTOLIC BLOOD PRESSURE: 110 MMHG | HEART RATE: 76 BPM | BODY MASS INDEX: 30.48 KG/M2 | HEIGHT: 73 IN | WEIGHT: 230 LBS | DIASTOLIC BLOOD PRESSURE: 60 MMHG

## 2025-04-30 DIAGNOSIS — I49.5 SICK SINUS SYNDROME (HCC): Chronic | ICD-10-CM

## 2025-04-30 DIAGNOSIS — I48.0 PAF (PAROXYSMAL ATRIAL FIBRILLATION) (HCC): ICD-10-CM

## 2025-04-30 DIAGNOSIS — I42.0 DILATED CARDIOMYOPATHY (HCC): Primary | ICD-10-CM

## 2025-04-30 PROCEDURE — RECHECK: Performed by: INTERNAL MEDICINE

## 2025-04-30 PROCEDURE — 99215 OFFICE O/P EST HI 40 MIN: CPT | Performed by: INTERNAL MEDICINE

## 2025-04-30 PROCEDURE — 93000 ELECTROCARDIOGRAM COMPLETE: CPT | Performed by: INTERNAL MEDICINE

## 2025-04-30 NOTE — PATIENT INSTRUCTIONS
"Echocardiogram planned to evaluate heart function and rule out significant valvular heart disease.      Heart healthy nutrition:    Avoid cholesterol rich foods such as egg yolk, red meat, high fat dairy and fried food.        Mediterranean diet: A heart-healthy eating plan    What is the Mediterranean diet?  The Mediterranean diet is a way of eating based on the traditional cuisine of countries bordering the [place] Sea. While there is no single definition of the Mediterranean diet, it is typically high in vegetables, fruits, whole grains, beans, nut and seeds, and olive oil.    The main components of Mediterranean diet include:    Daily consumption of vegetables, fruits, whole grains and healthy fats.  Avoid cholesterol rich foods such as egg yolks, red meat, high fat dairy and fried food.  Limited intake of animal-based food.  Other important elements of the Mediterranean diet are sharing meals with family and friends    Plant based, not meat based  The foundation of the Mediterranean diet is vegetables, fruits, herbs, nuts, beans and whole grains. Meals are built around these plant-based foods.     Healthy fats  Healthy fats are a mainstay of the Mediterranean diet. They're eaten instead of less healthy fats, such as saturated and trans fats, which contribute to heart disease.    Olive oil is the primary source of added fat in the Mediterranean diet. Olive oil provides monounsaturated fat, which has been found to lower total cholesterol and low-density lipoprotein (LDL or \"bad\") cholesterol levels. Nuts and seeds also contain monounsaturated fat.    Fish are also important in the Mediterranean diet. Fatty fish -- such as mackerel, herring, sardines, albacore tuna, salmon and lake trout -- are rich in omega-3 fatty acids, a type of polyunsaturated fat that may reduce inflammation in the body. Omega-3 fatty acids also help decrease triglycerides, reduce blood clotting, and decrease the risk of stroke and " heart failure.    Eating the Mediterranean way  Interested in trying the Mediterranean diet? These tips will help you get started:    Eat more fruits and vegetables. Aim for 7 to 10 servings a day of fruit and vegetables.  Opt for whole grains. Switch to whole-grain bread, cereal and pasta. [place] with other whole grains.  Use healthy fats. Try olive oil as a replacement for butter when cooking. Instead of putting butter or margarine on bread, try dipping it in flavored olive oil.  Eat more seafood. Eat fish twice a week. Fresh or water-packed tuna, salmon, trout, mackerel and herring are healthy choices. Grilled fish tastes good and requires little cleanup. Avoid deep-fried fish.  Avoid red meat.   Spice it up. Herbs and spices boost flavor and lessen the need for salt.    Source: https://www.mayoclinic.org/healthy-lifestyle/nutrition-and-healthy-eating/in-depth/mediterranean-diet/art-28672579

## 2025-04-30 NOTE — ASSESSMENT & PLAN NOTE
No documented recurrence on recent device check.  Status post pacemaker for sick sinus syndrome.  Currently on anticoagulation with Eliquis.  Continue Eliquis and beta-blockers.

## 2025-04-30 NOTE — PROGRESS NOTES
[facility] [place]  [address]  [address]  [facility] [address]  Phone#  [phone]  Fax#  [phone]  [facility]'s Cardiology Office Consultation             NAME: Prince Branch  AGE: 80 y.o. SEX: male   : 1945   MRN: [id number]    DATE: 2025  TIME: 9:31 AM    Cardiology Problem list:  Nonischemic cardiomyopathy  EKG abnormal: Deep inferior and anterolateral T wave inversions/ left bundle branch block  : SPECT abnormal  : Cath-no obstructive CAD  : Cath: Mild apical LAD disease but no significant stenosis.  Sick sinus syndrome: Saint Otto pacemaker 2018  Paroxysmal atrial fibrillation  Hypertension  Dyslipidemia      Assessment & Plan  Dilated cardiomyopathy (HCC)  Nonischemic cardiomyopathy.  Cardiac cath in  and  with no significant obstructive CAD.  Findings shared with the patient.  GDMT reviewed.  Declines Entresto due to previous side effects of severe fatigue and low blood pressure.  Currently only on metoprolol.  Discussed adding additional GDMT including Jardiance or losartan/spironolactone.  Blood pressure is historically low.  Prefers continuing current medical therapy.  Follow-up echo should be planned this year.  Echo requested.,  If EF is still low, we will consider adding additional GDMT as tolerated.    Orders:    POCT ECG    Echo complete w/ contrast if indicated; Future    Sick sinus syndrome (HCC)  Status post Saint Otto permanent pacemaker in 2018.  Recent device checks reviewed.  71% ventricular and 60% atrial pacing noted.  No high rate episodes.  Close follow-up in device clinic.         PAF (paroxysmal atrial fibrillation) (HCC)  No documented recurrence on recent device check.  Continue anticoagulation with Eliquis.  No bleeding reported.  Continue beta-blockers.  Orders:    apixaban (Eliquis) 5 mg; Take 1 tablet (5 mg total) by mouth 2 (two) times a day           HPI:    Prince Branch is a 80 y.o.-year-old male who  presents to the cardiology clinic for initial consultation.    He was last seen by Dr. Mejia on 4/26/2024.  He follows up with him on regular basis.  He has a history of nonischemic cardiomyopathy after an historically abnormal stress test.  Cardiac catheterization in 4/23 and 12/23 showed no significant obstructive CAD.  He has a history of paroxysmal atrial fibrillation, sick sinus syndrome status post pacemaker.  He also has a history of dilated cardiomyopathy.  Echo reviewed from 4/24: EF 45% with grade 1 diastolic dysfunction, abnormal septal motion, moderate AI and mild MR.  Aortic root and ascending aorta mildly dilated.  Device check from 4/22/2025 showed 60% atrial pacing and 71% ventricular pacing.  No high rate episodes.  He continues to report intermittent chest pain, but improved on aciphex.  No change in functional capacity.  He is on aspirin and Eliquis with no bleeding reported.  Blood pressure continues to be low.      Past history, family history, social history, current medications, vital signs, recent lab and imaging studies and  prior cardiology studies reviewed independently on this visit.    Allergies   Allergen Reactions    Other Anaphylaxis     WALNUT       Current Outpatient Medications:     apixaban (Eliquis) 5 mg, Take 1 tablet (5 mg total) by mouth 2 (two) times a day, Disp: 180 tablet, Rfl: 3    aspirin 81 mg chewable tablet, Chew 81 mg daily, Disp: , Rfl:     benzonatate (TESSALON PERLES) 100 mg capsule, Take 1 capsule (100 mg total) by mouth 3 (three) times a day as needed for cough, Disp: 20 capsule, Rfl: 0    CALCIUM PO, Take by mouth, Disp: , Rfl:     clotrimazole-betamethasone (LOTRISONE) 1-0.05 % cream, Apply topically 2 (two) times a day, Disp: 45 g, Rfl: 1    Ferrous Sulfate (IRON PO), Take by mouth, Disp: , Rfl:     fluticasone (FLONASE) 50 mcg/act nasal spray, INSTILL ONE SPRAY INTO EACH NOSTRIL DAILY FOR 14 DAYS, Disp: 16 g, Rfl: 1    Glutamine 500 MG CAPS, Take by mouth  every morning, Disp: , Rfl:     metoprolol succinate (TOPROL-XL) 25 mg 24 hr tablet, Take 25 mg by mouth every 24 hours, Disp: , Rfl:     multivitamin (THERAGRAN) TABS, Take 1 tablet by mouth daily, Disp: , Rfl:     nitroglycerin (NITROSTAT) 0.4 mg SL tablet, PLACE ONE TABLET (0.4 MG TOTAL) UNDER THE TONGUE EVERY FIVE MINUTES AS NEEDED FOR CHEST PAIN, Disp: 25 tablet, Rfl: 1    omeprazole (PriLOSEC) 40 MG capsule, Take 1 capsule (40 mg total) by mouth daily, Disp: , Rfl:     rosuvastatin (CRESTOR) 20 MG tablet, Take 1 tablet (20 mg total) by mouth daily, Disp: 90 tablet, Rfl: 1    vitamin B-12 (CYANOCOBALAMIN) 100 MCG TABS, Take 50 mcg by mouth daily, Disp: , Rfl:     docusate sodium (COLACE) 100 mg capsule, Take 1 capsule (100 mg total) by mouth 2 (two) times a day for 7 days, Disp: 14 capsule, Rfl: 0    Past Medical History:   Diagnosis Date    Bradycardia     permanent pacemaker    Chest pain     Colon polyp     Coronary artery disease 2016    GERD (gastroesophageal reflux disease)     Hearing aid worn     Hiatal hernia     High cholesterol     Hypertension      Past Surgical History:   Procedure Laterality Date    CARDIAC CATHETERIZATION Left 4/5/2023    Procedure: Cardiac Left Heart Cath;  Surgeon: Alexis Brower MD;  [facility];  Service: Cardiology    CARDIAC PACEMAKER PLACEMENT  2017    sees cardiologist in [place]- last visit was 5/2020 and has a monitor to check pacemaker    CARDIAC PACEMAKER PLACEMENT      COLONOSCOPY      CYSTOSCOPY  2018    HERNIA REPAIR      OR RPR AA HERNIA 1ST < 3 CM REDUCIBLE N/A 5/8/2024    Procedure: REPAIR OF UMBILICAL HERIA;  Surgeon: Brian Gama MD;  Location:  MAIN OR;  Service: General    OR RPR RECRT INGUINAL HERNIA ANY AGE REDUCIBLE Left 12/11/2020    Procedure: REPAIR HERNIA INGUINAL;  Surgeon: Brian Gama MD;  Location: [facility];  Service: General     Family History   Problem Relation Age of Onset    Cancer Mother     Colon cancer  Mother     Breast cancer Sister 82    Hearing loss Sister      Social History   reports that he has quit smoking. His smoking use included cigarettes. He has never used smokeless tobacco. He reports current alcohol use. He reports that he does not use drugs.     Review of Systems   Constitutional:  Negative for fatigue.   HENT: Negative.     Eyes: Negative.    Respiratory:  Positive for chest tightness. Negative for shortness of breath.    Cardiovascular:  Positive for chest pain. Negative for palpitations and leg swelling.   Gastrointestinal:  Positive for abdominal pain.   Endocrine: Negative.    Musculoskeletal:  Positive for arthralgias.   Neurological:  Negative for syncope.   Hematological: Negative.    Psychiatric/Behavioral:  Negative for sleep disturbance.    All other systems reviewed and are negative.      Objective:     Vitals:    04/30/25 0908   BP: 110/60   Pulse: 76   SpO2: 99%     Physical Exam  Vitals reviewed.   Constitutional:       General: He is not in acute distress.  HENT:      Head: Normocephalic.   Cardiovascular:      Rate and Rhythm: Normal rate and regular rhythm.      Heart sounds: S1 normal and S2 normal. Murmur heard.      Systolic murmur is present with a grade of 2/6.      Comments: Chest: Device noted in right upper chest. No overlying erythema or tenderness.   Pulmonary:      Breath sounds: No wheezing or rales.   Musculoskeletal:         General: No swelling.      Right lower leg: No edema.      Left lower leg: No edema.   Skin:     General: Skin is warm.   Neurological:      Mental Status: He is alert.   Psychiatric:         Mood and Affect: Mood normal.         Pertinent Laboratory/Diagnostic Studies:    Laboratory studies reviewed personally by Antoni Mercado MD    BMP:   Lab Results   Component Value Date    SODIUM 136 02/25/2025    K 4.4 02/25/2025     02/25/2025    CO2 27 02/25/2025    BUN 17 02/25/2025    CREATININE 0.87 02/25/2025    GLUC 102 04/05/2023    CALCIUM 9.3  "02/25/2025     CBC:  Lab Results   Component Value Date    WBC 7.64 08/27/2024    HGB 13.1 08/27/2024    HCT 39.5 08/27/2024    MCV 93 08/27/2024     08/27/2024     Lipid Profile:   Lab Results   Component Value Date    HDL 64 02/25/2025     Lab Results   Component Value Date    LDLCALC 62 02/25/2025     Lab Results   Component Value Date    TRIG 92 02/25/2025      Other labs:  Lab Results   Component Value Date    BLD8EMCGKMKC 2.189 08/27/2024     Lab Results   Component Value Date    ALT 17 02/25/2025    AST 22 02/25/2025     No results found for: \"HGBA1C\"    Imaging Studies:     Pertinent cardiac studies and imaging studies were personally reviewed on this visit and results summarized.    I have spent a total time of 48  minutes in caring for this patient on the day of the visit/encounter including reviewing and entering of medical history, physical examination, diagnostic results, instructions for management, patient education, risk factor reduction, documenting in the medical record, sending communications to other providers, reviewing/ordering tests, medicine, procedures etc.    Portions of the record may have been created with voice recognition software.  Occasional wrong word or \"sound alike\" substitutions may have occurred due to the inherent limitations of voice recognition software.  Read the chart carefully and recognize, using context, where substitutions have occurred. Please reach out to me directly for any clarifications.  "

## 2025-04-30 NOTE — ASSESSMENT & PLAN NOTE
Status post [employer] Otto permanent pacemaker in 2018.  Recent device checks reviewed.  71% ventricular and 60% atrial pacing noted.  No high rate episodes.  Close follow-up in device clinic.

## 2025-04-30 NOTE — ASSESSMENT & PLAN NOTE
Nonischemic cardiomyopathy.  Cardiac cath in 4/23 and 12/23 with no significant obstructive CAD.  Findings shared with the patient.  GDMT reviewed.  Declines Entresto due to previous side effects of severe fatigue and low blood pressure.  Currently only on metoprolol.  Discussed adding additional GDMT including Jardiance or losartan/spironolactone.  Blood pressure is historically low.  Prefers continuing current medical therapy.  Follow-up echo should be planned this year.  Echo requested.,  If EF is still low, we will consider adding additional GDMT as tolerated.    Orders:    POCT ECG    Echo complete w/ contrast if indicated; Future

## 2025-05-05 DIAGNOSIS — J30.1 SEASONAL ALLERGIC RHINITIS DUE TO POLLEN: ICD-10-CM

## 2025-05-06 RX ORDER — FLUTICASONE PROPIONATE 50 MCG
SPRAY, SUSPENSION (ML) NASAL
Qty: 16 G | Refills: 1 | Status: SHIPPED | OUTPATIENT
Start: 2025-05-06

## 2025-05-12 ENCOUNTER — TELEPHONE (OUTPATIENT)
Dept: GASTROENTEROLOGY | Facility: AMBULARY SURGERY CENTER | Age: 80
End: 2025-05-12

## 2025-05-12 DIAGNOSIS — K21.9 GASTROESOPHAGEAL REFLUX DISEASE WITHOUT ESOPHAGITIS: Primary | ICD-10-CM

## 2025-05-12 RX ORDER — RABEPRAZOLE SODIUM 20 MG/1
20 TABLET, DELAYED RELEASE ORAL DAILY
Qty: 30 TABLET | Refills: 5 | Status: SHIPPED | OUTPATIENT
Start: 2025-05-12

## 2025-05-12 NOTE — TELEPHONE ENCOUNTER
Spoke with patient.  Patient states he is not taking Omeprazole.  He is only taking  Aciphex 20 mg.  He needs a refill called to [facility] pharmacy in [place].

## 2025-05-12 NOTE — TELEPHONE ENCOUNTER
Patient called pharmacy for a refill of RABEprazole (ACIPHEX) 20 MG tablet  and he was told the medication was discontinued.  He has not stopped this medication and was not told it was stopped he would like it refilled and sent to [facility] if this is not appropriate. Please contact patient direclt to discuss when and why it was stopped.  Patient is now out of med so if appropriate please send as soon as possible     Discontinued by: Jeremías Gibbons MD on 4/18/2025 11:43   but no reason attached

## 2025-05-23 NOTE — TELEPHONE ENCOUNTER
Patient states that he has been taking RABEprazole 20mg twice a day. The script that was sent to the pharmacy was only for once a day.     Please review how patient should be taking.

## 2025-06-03 DIAGNOSIS — K21.9 GASTROESOPHAGEAL REFLUX DISEASE WITHOUT ESOPHAGITIS: Primary | ICD-10-CM

## 2025-06-03 RX ORDER — RABEPRAZOLE SODIUM 20 MG/1
20 TABLET, DELAYED RELEASE ORAL 2 TIMES DAILY
Qty: 60 TABLET | Refills: 5 | Status: SHIPPED | OUTPATIENT
Start: 2025-06-03

## 2025-07-10 ENCOUNTER — RA CDI HCC (OUTPATIENT)
Dept: OTHER | Facility: HOSPITAL | Age: 80
End: 2025-07-10

## 2025-07-15 ENCOUNTER — HOSPITAL ENCOUNTER (OUTPATIENT)
Dept: NON INVASIVE DIAGNOSTICS | Facility: CLINIC | Age: 80
Discharge: HOME | End: 2025-07-15
Attending: INTERNAL MEDICINE
Payer: MEDICARE

## 2025-07-15 VITALS
HEIGHT: 73 IN | BODY MASS INDEX: 30.39 KG/M2 | SYSTOLIC BLOOD PRESSURE: 110 MMHG | HEART RATE: 76 BPM | WEIGHT: 229.28 LBS | DIASTOLIC BLOOD PRESSURE: 60 MMHG

## 2025-07-15 DIAGNOSIS — I42.0 DILATED CARDIOMYOPATHY (HCC): ICD-10-CM

## 2025-07-15 LAB
AORTIC ROOT: 3.1 CM
AORTIC VALVE MEAN VELOCITY: 7.7 M/S
ASCENDING AORTA: 3.6 CM
AV AREA BY CONTINUOUS VTI: 3.4 CM2
AV AREA PEAK VELOCITY: 3.1 CM2
AV LVOT MEAN GRADIENT: 1 MMHG
AV LVOT PEAK GRADIENT: 3 MMHG
AV MEAN PRESS GRAD SYS DOP V1V2: 3 MMHG
AV ORIFICE AREA US: 3.44 CM2
AV PEAK GRADIENT: 5 MMHG
AV REGURGITATION PRESSURE HALF TIME: 477 MS
AV VELOCITY RATIO: 0.83
AV VMAX SYS DOP: 1.16 M/S
BSA FOR ECHO PROCEDURE: 2.28 M2
DOP CALC AO VTI: 26 CM
DOP CALC LVOT AREA: 4.15 CM2
DOP CALC LVOT CARDIAC INDEX: 2.32 L/MIN/M2
DOP CALC LVOT CARDIAC OUTPUT: 5.29 L/MIN
DOP CALC LVOT DIAMETER: 2.3 CM
DOP CALC LVOT PEAK VEL VTI: 21.53 CM
DOP CALC LVOT PEAK VEL: 0.86 M/S
DOP CALC LVOT STROKE INDEX: 37.7 ML/M2
DOP CALC LVOT STROKE VOLUME: 89.41
FRACTIONAL SHORTENING: 22 (ref 28–44)
INTERVENTRICULAR SEPTUM IN DIASTOLE (PARASTERNAL SHORT AXIS VIEW): 1.8 CM
INTERVENTRICULAR SEPTUM: 1.8 CM (ref 0.6–1.1)
LAAS-AP2: 28 CM2
LAAS-AP4: 25.8 CM2
LEFT ATRIUM SIZE: 4.8 CM
LEFT ATRIUM VOLUME (MOD BIPLANE): 97 ML
LEFT ATRIUM VOLUME INDEX (MOD BIPLANE): 42.5 ML/M2
LEFT INTERNAL DIMENSION IN SYSTOLE: 4.3 CM (ref 2.1–4)
LEFT VENTRICULAR INTERNAL DIMENSION IN DIASTOLE: 5.5 CM (ref 3.5–6)
LEFT VENTRICULAR POSTERIOR WALL IN END DIASTOLE: 1.9 CM
LEFT VENTRICULAR STROKE VOLUME: 65 ML
LV EF US.2D.A4C+ESTIMATED: 44 %
LVSV (TEICH): 65 ML
MITRAL REGURGITATION PEAK VELOCITY: 5.35 M/S
MITRAL VALVE MEAN INFLOW VELOCITY: 4.42 M/S
MITRAL VALVE REGURGITANT PEAK GRADIENT: 115 MMHG
MV E'TISSUE VEL-SEP: 3 CM/S
RA PRESSURE ESTIMATED: 8 MMHG
RIGHT ATRIUM AREA SYSTOLE A4C: 20.9 CM2
RIGHT VENTRICLE ID DIMENSION: 4.5 CM
RV PSP: 32 MMHG
SINOTUBULAR JUNCTION: 2.9 CM
SL CV AV DECELERATION TIME RETROGRADE: 1644 MS
SL CV AV PEAK GRADIENT RETROGRADE: 49 MMHG
SL CV DOP CALC MV VTI RETROGRADE: 235.4 CM
SL CV LEFT ATRIUM LENGTH A2C: 5.7 CM
SL CV LV EF: 40
SL CV MV MEAN GRADIENT RETROGRADE: 84 MMHG
SL CV PED ECHO LEFT VENTRICLE DIASTOLIC VOLUME (MOD BIPLANE) 2D: 148 ML
SL CV PED ECHO LEFT VENTRICLE SYSTOLIC VOLUME (MOD BIPLANE) 2D: 82 ML
SL CV SINUS OF VALSALVA 2D: 3.9 CM
STJ: 2.9 CM
TR MAX PG: 24 MMHG
TR PEAK VELOCITY: 2.4 M/S
TRICUSPID ANNULAR PLANE SYSTOLIC EXCURSION: 2.7 CM
TRICUSPID VALVE PEAK REGURGITATION VELOCITY: 2.43 M/S

## 2025-07-15 PROCEDURE — 93306 TTE W/DOPPLER COMPLETE: CPT | Performed by: INTERNAL MEDICINE

## 2025-07-15 PROCEDURE — 93306 TTE W/DOPPLER COMPLETE: CPT

## 2025-07-16 ENCOUNTER — RESULTS FOLLOW-UP (OUTPATIENT)
Dept: CARDIOLOGY CLINIC | Facility: CLINIC | Age: 80
End: 2025-07-16

## 2025-07-16 DIAGNOSIS — I42.0 DILATED CARDIOMYOPATHY (HCC): Primary | ICD-10-CM

## 2025-07-16 NOTE — RESULT ENCOUNTER NOTE
ECHO reviewed.  Pumping strength (ejection fraction) is still low at 40%.  Aortic valve backflow is stable from previous echo.  We discussed at the office visit adding additional medicines but he was hesitant.  If agreeable, I would recommend starting Aldactone 25 mg a day.  Please discuss medical treatment with him and if agreeable, then we can send a prescription.

## 2025-07-17 ENCOUNTER — OFFICE VISIT (OUTPATIENT)
Dept: FAMILY MEDICINE CLINIC | Facility: CLINIC | Age: 80
End: 2025-07-17
Payer: MEDICARE

## 2025-07-17 VITALS
HEIGHT: 73 IN | HEART RATE: 74 BPM | DIASTOLIC BLOOD PRESSURE: 68 MMHG | SYSTOLIC BLOOD PRESSURE: 116 MMHG | BODY MASS INDEX: 31.28 KG/M2 | OXYGEN SATURATION: 97 % | TEMPERATURE: 97.5 F | RESPIRATION RATE: 18 BRPM | WEIGHT: 236 LBS

## 2025-07-17 DIAGNOSIS — I42.0 DILATED CARDIOMYOPATHY (HCC): Primary | ICD-10-CM

## 2025-07-17 DIAGNOSIS — K21.00 GASTROESOPHAGEAL REFLUX DISEASE WITH ESOPHAGITIS WITHOUT HEMORRHAGE: ICD-10-CM

## 2025-07-17 DIAGNOSIS — E78.5 HYPERLIPIDEMIA, UNSPECIFIED HYPERLIPIDEMIA TYPE: ICD-10-CM

## 2025-07-17 DIAGNOSIS — K21.9 GASTROESOPHAGEAL REFLUX DISEASE WITHOUT ESOPHAGITIS: ICD-10-CM

## 2025-07-17 PROCEDURE — 99214 OFFICE O/P EST MOD 30 MIN: CPT | Performed by: FAMILY MEDICINE

## 2025-07-17 PROCEDURE — G2211 COMPLEX E/M VISIT ADD ON: HCPCS | Performed by: FAMILY MEDICINE

## 2025-07-17 RX ORDER — RABEPRAZOLE SODIUM 20 MG/1
20 TABLET, DELAYED RELEASE ORAL 2 TIMES DAILY
Qty: 180 TABLET | Refills: 1 | Status: SHIPPED | OUTPATIENT
Start: 2025-07-17

## 2025-07-17 RX ORDER — SPIRONOLACTONE 25 MG/1
25 TABLET ORAL DAILY
Qty: 90 TABLET | Refills: 1 | Status: SHIPPED | OUTPATIENT
Start: 2025-07-17

## 2025-07-17 NOTE — ASSESSMENT & PLAN NOTE
Patient to continue with present therapy and decrease caffeine, avoid ETOH and smoking to decrease acid production. Pt should also cease eating prior to bedtime and avoid excessive fluid intake prior to sleep. May use antacids as needed for breakthrough GERD. All pateint questions answered today about this condition.   Orders:  •  RABEprazole (ACIPHEX) 20 MG tablet; Take 1 tablet (20 mg total) by mouth in the morning and 1 tablet (20 mg total) before bedtime.

## 2025-07-17 NOTE — PROGRESS NOTES
Name: Prince Branch      : 1945      MRN: [id number]  Encounter Provider: Jeremías Gibbons MD  Encounter Date: 2025   Encounter department: [facility]  :  Assessment & Plan  Dilated cardiomyopathy (HCC)  Patient is stable  and will continue present plan of care and reassess at next routine visit. All questions about this problem from patient were answered today.   Orders:  •  spironolactone (ALDACTONE) 25 mg tablet; Take 1 tablet (25 mg total) by mouth daily    Gastroesophageal reflux disease with esophagitis without hemorrhage  Patient to continue with present therapy and decrease caffeine, avoid ETOH and smoking to decrease acid production. Pt should also cease eating prior to bedtime and avoid excessive fluid intake prior to sleep. May use antacids as needed for breakthrough GERD. All pateint questions answered today about this condition.        Gastroesophageal reflux disease without esophagitis  Patient to continue with present therapy and decrease caffeine, avoid ETOH and smoking to decrease acid production. Pt should also cease eating prior to bedtime and avoid excessive fluid intake prior to sleep. May use antacids as needed for breakthrough GERD. All pateint questions answered today about this condition.   Orders:  •  RABEprazole (ACIPHEX) 20 MG tablet; Take 1 tablet (20 mg total) by mouth in the morning and 1 tablet (20 mg total) before bedtime.    Hyperlipidemia, unspecified hyperlipidemia type    Orders:  •  Comprehensive metabolic panel; Future  •  Lipid Panel with Direct LDL reflex; Future           History of Present Illness   80-year-old male here today for checkup on multimedical pause patient history of hypertension  cardiomyopathy paroxysmal atrial fibrillation history of sick sinus syndrome as well as GERD.  Patient is going to need refills on medications today which we will do for him he recently had seen cardiology and was recommended starting  "Aldactone I have talked to the patient and he is willing to let me start prescribing it to see his cardiologist and we will get him on Aldactone 25 mg daily as per hide the idea of his cardiologist.  Patient otherwise been doing really well he has been concerned about gaining some weight and we talked about trying to alter his diet to try and take his caloric intake down to lose some weight.      Review of Systems   Constitutional:  Negative for activity change, appetite change, chills, fatigue, fever and unexpected weight change.   HENT:  Negative for congestion, ear pain, hearing loss, mouth sores, postnasal drip, sinus pressure, sinus pain, sneezing and sore throat.    Respiratory:  Negative for apnea, cough, shortness of breath and wheezing.    Cardiovascular:  Negative for chest pain, palpitations and leg swelling.   Gastrointestinal:  Negative for abdominal pain, constipation, diarrhea, nausea and vomiting.   Endocrine: Negative for cold intolerance and heat intolerance.   Genitourinary:  Negative for dysuria, frequency and hematuria.   Musculoskeletal:  Negative for arthralgias, back pain, gait problem, joint swelling and neck pain.   Skin:  Negative for rash.   Neurological:  Negative for dizziness, weakness and numbness.   Hematological:  Does not bruise/bleed easily.   Psychiatric/Behavioral:  Negative for agitation, behavioral problems, confusion, hallucinations and sleep disturbance. The patient is not nervous/anxious.        Objective   /68 (BP Location: Left arm, Patient Position: Sitting, Cuff Size: Large)   Pulse 74   Temp 97.5 °F (36.4 °C)   Resp 18   Ht 6' 1\" (1.854 m)   Wt 107 kg (236 lb)   SpO2 97%   BMI 31.14 kg/m²      Physical Exam  Constitutional:       Appearance: He is well-developed.   HENT:      Head: Normocephalic and atraumatic.      Right Ear: External ear normal.      Left Ear: External ear normal.      Nose: Nose normal.      Mouth/Throat:      Pharynx: Oropharynx is " clear. No oropharyngeal exudate.     Eyes:      General: No scleral icterus.     Conjunctiva/sclera: Conjunctivae normal.      Pupils: Pupils are equal, round, and reactive to light.       Cardiovascular:      Rate and Rhythm: Normal rate and regular rhythm.      Heart sounds: Normal heart sounds.   Pulmonary:      Effort: Pulmonary effort is normal.      Breath sounds: Normal breath sounds. No wheezing or rales.   Abdominal:      General: Bowel sounds are normal.      Palpations: Abdomen is soft.      Tenderness: There is no abdominal tenderness. There is no guarding.     Musculoskeletal:         General: Normal range of motion.      Cervical back: Normal range of motion and neck supple.     Skin:     General: Skin is warm and dry.      Findings: No erythema or rash.     Neurological:      Mental Status: He is alert and oriented to person, place, and time. Mental status is at baseline.     Psychiatric:         Mood and Affect: Mood normal.         Behavior: Behavior normal.         Thought Content: Thought content normal.         Judgment: Judgment normal.

## 2025-07-17 NOTE — ASSESSMENT & PLAN NOTE
Patient is stable  and will continue present plan of care and reassess at next routine visit. All questions about this problem from patient were answered today.   Orders:  •  spironolactone (ALDACTONE) 25 mg tablet; Take 1 tablet (25 mg total) by mouth daily

## 2025-07-24 ENCOUNTER — REMOTE DEVICE CLINIC VISIT (OUTPATIENT)
Dept: CARDIOLOGY CLINIC | Facility: CLINIC | Age: 80
End: 2025-07-24
Payer: MEDICARE

## 2025-07-24 DIAGNOSIS — Z95.0 CARDIAC PACEMAKER IN SITU: Primary | ICD-10-CM

## 2025-07-24 PROCEDURE — 93296 REM INTERROG EVL PM/IDS: CPT | Performed by: INTERNAL MEDICINE

## 2025-07-24 PROCEDURE — 93294 REM INTERROG EVL PM/LDLS PM: CPT | Performed by: INTERNAL MEDICINE

## 2025-07-24 NOTE — PROGRESS NOTES
Results for orders placed or performed in visit on 07/24/25   Cardiac EP device report    Narrative    SJM DC PM/ACTIVE SYSTEM IS MRI CONDITIONAL  MERLIN TRANSMISSION: BATTERY VOLTAGE ADEQUATE (2.3 YRS). AP-59%, -71% (>40% DDDR@60PPM). ALL AVAILABLE LEAD PARAMETERS WITHIN NORMAL LIMITS. 1 AMS EPISODE on 4/30/25 LASTING 1.4 HRS. AF BURDEN SINCE 1/22/25 <1%. PT ON ELIQUIS, ASA 81MG & METOPROLOL. NORMAL DEVICE FUNCTION. GV

## (undated) DEVICE — SUT VICRYL 3-0 18 IN J904T

## (undated) DEVICE — 3M™ TEGADERM™ TRANSPARENT FILM DRESSING FRAME STYLE, 1626W, 4 IN X 4-3/4 IN (10 CM X 12 CM), 50/CT 4CT/CASE: Brand: 3M™ TEGADERM™

## (undated) DEVICE — STRL PENROSE DRAIN 18" X 1/4": Brand: CARDINAL HEALTH

## (undated) DEVICE — SUT VICRYL 0 18 IN J906G

## (undated) DEVICE — SUT PROLENE 2-0 SH 30 IN 8833H

## (undated) DEVICE — 3000CC GUARDIAN II: Brand: GUARDIAN

## (undated) DEVICE — TIBURON LAPAROTOMY DRAPE: Brand: CONVERTORS

## (undated) DEVICE — NEEDLE 25G X 1 1/2

## (undated) DEVICE — ADHESIVE SKIN HIGH VISCOSITY EXOFIN 1ML

## (undated) DEVICE — SYRINGE 10ML LL CONTROL TOP

## (undated) DEVICE — GLOVE INDICATOR PI UNDERGLOVE SZ 7 BLUE

## (undated) DEVICE — PACK GENERAL LF

## (undated) DEVICE — MEDI-VAC YANK SUCT HNDL W/TPRD BULBOUS TIP: Brand: CARDINAL HEALTH

## (undated) DEVICE — SUT PDS II 2-0 CT-2 27 IN Z333H

## (undated) DEVICE — GUIDEWIRE WHOLEY HI TORQUE INTERM MOD J .035 145CM

## (undated) DEVICE — TUBING SUCTION 5MM X 12 FT

## (undated) DEVICE — BETHLEHEM UNIVERSAL MINOR GEN: Brand: CARDINAL HEALTH

## (undated) DEVICE — SUT MONOCRYL 4-0 PC-5 18 IN Y823G

## (undated) DEVICE — CHLORAPREP HI-LITE 26ML ORANGE

## (undated) DEVICE — FABRIC REINFORCED, SURGICAL GOWN, XL: Brand: CONVERTORS

## (undated) DEVICE — GAUZE SPONGES,16 PLY: Brand: CURITY

## (undated) DEVICE — SUT PROLENE 2-0 CT-2 30 IN 8411H

## (undated) DEVICE — GLIDESHEATH BASIC HYDROPHILIC COATED INTRODUCER SHEATH: Brand: GLIDESHEATH

## (undated) DEVICE — SUT VICRYL 2-0 CT-2 27 IN J269H

## (undated) DEVICE — BASIC DOUBLE BASIN 2-LF: Brand: MEDLINE INDUSTRIES, INC.

## (undated) DEVICE — SUT CHROMIC 2-0 CT-1 27 IN 811H

## (undated) DEVICE — SCD SEQUENTIAL COMPRESSION COMFORT SLEEVE MEDIUM KNEE LENGTH: Brand: KENDALL SCD

## (undated) DEVICE — LABEL MEDICATION STERILE 2 YELLOW LIDOCAINE 2 BLUE MARCAINE 2 ORANGE HEPARIN

## (undated) DEVICE — SUT MONOCRYL 4-0 PS-2 18 IN Y496G

## (undated) DEVICE — GLOVE SRG BIOGEL 7

## (undated) DEVICE — 3M™ IOBAN™ 2 ANTIMICROBIAL INCISE DRAPE 6640EZ: Brand: IOBAN™ 2

## (undated) DEVICE — SUT CHROMIC 0 CT 36 IN 914H

## (undated) DEVICE — ANTIBACTERIAL UNDYED BRAIDED (POLYGLACTIN 910), SYNTHETIC ABSORBABLE SUTURE: Brand: COATED VICRYL

## (undated) DEVICE — SURGICAL CLIPPER BLADE GENERAL USE

## (undated) DEVICE — SPONGE: SPECIALTY K-DISS XR  100/CS: Brand: MEDICAL ACTION INDUSTRIES

## (undated) DEVICE — GLOVE INDICATOR PI UNDERGLOVE SZ 7.5 BLUE

## (undated) DEVICE — INTENDED FOR TISSUE SEPARATION, AND OTHER PROCEDURES THAT REQUIRE A SHARP SURGICAL BLADE TO PUNCTURE OR CUT.: Brand: BARD-PARKER SAFETY BLADES SIZE 15, STERILE

## (undated) DEVICE — NEPTUNE E-SEP SMOKE EVACUATION PENCIL, COATED, 70MM BLADE, PUSH BUTTON SWITCH: Brand: NEPTUNE E-SEP

## (undated) DEVICE — 3M™ TEGADERM™ CHG DRESSING 25/CARTON 4 CARTONS/CASE 1659: Brand: TEGADERM™

## (undated) DEVICE — SPONGE GAUZE 4 X 8 12 PLY STRL LF

## (undated) DEVICE — TR BAND RADIAL ARTERY COMPRESSION DEVICE: Brand: TR BAND

## (undated) DEVICE — RADIFOCUS OPTITORQUE ANGIOGRAPHIC CATHETER: Brand: OPTITORQUE

## (undated) DEVICE — GROUNDING PAD UNIVERSAL SLW

## (undated) DEVICE — SUT PDS II 2-0 CT-1 27 IN Z259H

## (undated) DEVICE — MICRO HVTSA, 0.5G AND HVTSA SOURCEMARK PRODUCT CODE M1206 AND M1206-01: Brand: EXOFIN MICRO HVTSA, 0.5G